# Patient Record
Sex: MALE | Race: WHITE | NOT HISPANIC OR LATINO | URBAN - METROPOLITAN AREA
[De-identification: names, ages, dates, MRNs, and addresses within clinical notes are randomized per-mention and may not be internally consistent; named-entity substitution may affect disease eponyms.]

---

## 2023-08-03 ENCOUNTER — HOSPITAL ENCOUNTER (INPATIENT)
Facility: HOSPITAL | Age: 53
LOS: 14 days | Discharge: NURSING, REHAB OR LONG-TERM CARE | DRG: 372 | End: 2023-08-17
Attending: HOSPITALIST | Admitting: STUDENT IN AN ORGANIZED HEALTH CARE EDUCATION/TRAINING PROGRAM
Payer: COMMERCIAL

## 2023-08-03 DIAGNOSIS — E87.6 HYPOKALEMIA: ICD-10-CM

## 2023-08-03 DIAGNOSIS — K74.60 HEPATIC CIRRHOSIS, UNSPECIFIED HEPATIC CIRRHOSIS TYPE, UNSPECIFIED WHETHER ASCITES PRESENT: ICD-10-CM

## 2023-08-03 DIAGNOSIS — R18.8 ABDOMINAL FLUID COLLECTION: ICD-10-CM

## 2023-08-03 DIAGNOSIS — R18.8 INTRA-ABDOMINAL FLUID COLLECTION: ICD-10-CM

## 2023-08-03 DIAGNOSIS — D50.8 OTHER IRON DEFICIENCY ANEMIA: ICD-10-CM

## 2023-08-03 DIAGNOSIS — K65.1 INTRA-ABDOMINAL ABSCESS: ICD-10-CM

## 2023-08-03 DIAGNOSIS — L30.9 ECZEMA, UNSPECIFIED TYPE: Primary | ICD-10-CM

## 2023-08-03 PROBLEM — F32.9 MDD (MAJOR DEPRESSIVE DISORDER): Status: ACTIVE | Noted: 2023-08-03

## 2023-08-03 PROBLEM — D64.9 NORMOCYTIC ANEMIA: Status: ACTIVE | Noted: 2023-08-03

## 2023-08-03 PROBLEM — B19.20 HCV (HEPATITIS C VIRUS): Status: ACTIVE | Noted: 2023-08-03

## 2023-08-03 PROBLEM — D72.829 LEUKOCYTOSIS: Status: ACTIVE | Noted: 2023-08-03

## 2023-08-03 PROBLEM — S30.1XXA ABDOMINAL HEMATOMA: Status: ACTIVE | Noted: 2023-08-03

## 2023-08-03 PROBLEM — R06.6 HICCUPS: Status: ACTIVE | Noted: 2023-08-03

## 2023-08-03 PROBLEM — I10 HTN (HYPERTENSION): Status: ACTIVE | Noted: 2023-08-03

## 2023-08-03 PROBLEM — E03.9 HYPOTHYROIDISM: Status: ACTIVE | Noted: 2023-08-03

## 2023-08-03 PROBLEM — R22.0 TONGUE SWELLING: Status: ACTIVE | Noted: 2023-08-03

## 2023-08-03 PROBLEM — E83.51 HYPOCALCEMIA: Status: ACTIVE | Noted: 2023-08-03

## 2023-08-03 PROBLEM — D50.9 IDA (IRON DEFICIENCY ANEMIA): Status: ACTIVE | Noted: 2023-08-03

## 2023-08-03 PROBLEM — K80.20 GALLSTONES: Status: ACTIVE | Noted: 2023-08-03

## 2023-08-03 LAB
ALBUMIN SERPL BCP-MCNC: 2.4 G/DL (ref 3.5–5.2)
ALBUMIN SERPL BCP-MCNC: 2.4 G/DL (ref 3.5–5.2)
ALP SERPL-CCNC: 119 U/L (ref 55–135)
ALP SERPL-CCNC: 120 U/L (ref 55–135)
ALT SERPL W/O P-5'-P-CCNC: 20 U/L (ref 10–44)
ALT SERPL W/O P-5'-P-CCNC: 21 U/L (ref 10–44)
ANION GAP SERPL CALC-SCNC: 11 MMOL/L (ref 8–16)
ANION GAP SERPL CALC-SCNC: 11 MMOL/L (ref 8–16)
ANION GAP SERPL CALC-SCNC: 7 MMOL/L (ref 8–16)
AST SERPL-CCNC: 32 U/L (ref 10–40)
AST SERPL-CCNC: 32 U/L (ref 10–40)
BASOPHILS # BLD AUTO: 0.07 K/UL (ref 0–0.2)
BASOPHILS NFR BLD: 0.5 % (ref 0–1.9)
BILIRUB SERPL-MCNC: 2.2 MG/DL (ref 0.1–1)
BILIRUB SERPL-MCNC: 2.2 MG/DL (ref 0.1–1)
BUN SERPL-MCNC: 11 MG/DL (ref 6–20)
BUN SERPL-MCNC: 13 MG/DL (ref 6–20)
BUN SERPL-MCNC: 14 MG/DL (ref 6–20)
CALCIUM SERPL-MCNC: 7.9 MG/DL (ref 8.7–10.5)
CALCIUM SERPL-MCNC: 8 MG/DL (ref 8.7–10.5)
CALCIUM SERPL-MCNC: 8.2 MG/DL (ref 8.7–10.5)
CHLORIDE SERPL-SCNC: 97 MMOL/L (ref 95–110)
CHLORIDE SERPL-SCNC: 99 MMOL/L (ref 95–110)
CHLORIDE SERPL-SCNC: 99 MMOL/L (ref 95–110)
CO2 SERPL-SCNC: 26 MMOL/L (ref 23–29)
CO2 SERPL-SCNC: 27 MMOL/L (ref 23–29)
CO2 SERPL-SCNC: 27 MMOL/L (ref 23–29)
CREAT SERPL-MCNC: 0.6 MG/DL (ref 0.5–1.4)
CREAT SERPL-MCNC: 0.7 MG/DL (ref 0.5–1.4)
CREAT SERPL-MCNC: 0.7 MG/DL (ref 0.5–1.4)
DIFFERENTIAL METHOD: ABNORMAL
EOSINOPHIL # BLD AUTO: 0.2 K/UL (ref 0–0.5)
EOSINOPHIL NFR BLD: 1.6 % (ref 0–8)
ERYTHROCYTE [DISTWIDTH] IN BLOOD BY AUTOMATED COUNT: 15 % (ref 11.5–14.5)
EST. GFR  (NO RACE VARIABLE): >60 ML/MIN/1.73 M^2
FERRITIN SERPL-MCNC: 213 NG/ML (ref 20–300)
GLUCOSE SERPL-MCNC: 112 MG/DL (ref 70–110)
GLUCOSE SERPL-MCNC: 114 MG/DL (ref 70–110)
GLUCOSE SERPL-MCNC: 94 MG/DL (ref 70–110)
HCT VFR BLD AUTO: 35.6 % (ref 40–54)
HCV AB SERPL QL IA: REACTIVE
HGB BLD-MCNC: 12.1 G/DL (ref 14–18)
HIV 1+2 AB+HIV1 P24 AG SERPL QL IA: NORMAL
IMM GRANULOCYTES # BLD AUTO: 0.41 K/UL (ref 0–0.04)
IMM GRANULOCYTES NFR BLD AUTO: 2.7 % (ref 0–0.5)
INR PPP: 1.2 (ref 0.8–1.2)
IRON SERPL-MCNC: 26 UG/DL (ref 45–160)
LYMPHOCYTES # BLD AUTO: 1.2 K/UL (ref 1–4.8)
LYMPHOCYTES NFR BLD: 7.7 % (ref 18–48)
MAGNESIUM SERPL-MCNC: 2 MG/DL (ref 1.6–2.6)
MAGNESIUM SERPL-MCNC: 2.1 MG/DL (ref 1.6–2.6)
MCH RBC QN AUTO: 29.7 PG (ref 27–31)
MCHC RBC AUTO-ENTMCNC: 34 G/DL (ref 32–36)
MCV RBC AUTO: 87 FL (ref 82–98)
MONOCYTES # BLD AUTO: 2.4 K/UL (ref 0.3–1)
MONOCYTES NFR BLD: 15.9 % (ref 4–15)
NEUTROPHILS # BLD AUTO: 10.9 K/UL (ref 1.8–7.7)
NEUTROPHILS NFR BLD: 71.6 % (ref 38–73)
NRBC BLD-RTO: 0 /100 WBC
OVALOCYTES BLD QL SMEAR: ABNORMAL
PHOSPHATE SERPL-MCNC: 3.3 MG/DL (ref 2.7–4.5)
PHOSPHATE SERPL-MCNC: 3.3 MG/DL (ref 2.7–4.5)
PLATELET # BLD AUTO: 101 K/UL (ref 150–450)
PLATELET BLD QL SMEAR: ABNORMAL
PMV BLD AUTO: 11 FL (ref 9.2–12.9)
POTASSIUM SERPL-SCNC: 2.9 MMOL/L (ref 3.5–5.1)
POTASSIUM SERPL-SCNC: 2.9 MMOL/L (ref 3.5–5.1)
POTASSIUM SERPL-SCNC: 3 MMOL/L (ref 3.5–5.1)
PROCALCITONIN SERPL IA-MCNC: 0.59 NG/ML
PROT SERPL-MCNC: 5.7 G/DL (ref 6–8.4)
PROT SERPL-MCNC: 5.7 G/DL (ref 6–8.4)
PROTHROMBIN TIME: 12.5 SEC (ref 9–12.5)
RBC # BLD AUTO: 4.08 M/UL (ref 4.6–6.2)
SATURATED IRON: 10 % (ref 20–50)
SODIUM SERPL-SCNC: 133 MMOL/L (ref 136–145)
SODIUM SERPL-SCNC: 135 MMOL/L (ref 136–145)
SODIUM SERPL-SCNC: 136 MMOL/L (ref 136–145)
SPHEROCYTES BLD QL SMEAR: ABNORMAL
TOTAL IRON BINDING CAPACITY: 250 UG/DL (ref 250–450)
TOXIC GRANULES BLD QL SMEAR: PRESENT
TRANSFERRIN SERPL-MCNC: 169 MG/DL (ref 200–375)
TRANSFERRIN SERPL-MCNC: 169 MG/DL (ref 200–375)
WBC # BLD AUTO: 15.24 K/UL (ref 3.9–12.7)

## 2023-08-03 PROCEDURE — 63600175 PHARM REV CODE 636 W HCPCS: Performed by: STUDENT IN AN ORGANIZED HEALTH CARE EDUCATION/TRAINING PROGRAM

## 2023-08-03 PROCEDURE — 25000003 PHARM REV CODE 250: Performed by: STUDENT IN AN ORGANIZED HEALTH CARE EDUCATION/TRAINING PROGRAM

## 2023-08-03 PROCEDURE — 80048 BASIC METABOLIC PNL TOTAL CA: CPT | Mod: XB | Performed by: STUDENT IN AN ORGANIZED HEALTH CARE EDUCATION/TRAINING PROGRAM

## 2023-08-03 PROCEDURE — 11000001 HC ACUTE MED/SURG PRIVATE ROOM

## 2023-08-03 PROCEDURE — 99235 HOSP IP/OBS SAME DATE MOD 70: CPT | Mod: ,,, | Performed by: PHYSICIAN ASSISTANT

## 2023-08-03 PROCEDURE — 87040 BLOOD CULTURE FOR BACTERIA: CPT | Performed by: STUDENT IN AN ORGANIZED HEALTH CARE EDUCATION/TRAINING PROGRAM

## 2023-08-03 PROCEDURE — 25500020 PHARM REV CODE 255: Performed by: HOSPITALIST

## 2023-08-03 PROCEDURE — 99223 1ST HOSP IP/OBS HIGH 75: CPT | Mod: ,,, | Performed by: INTERNAL MEDICINE

## 2023-08-03 PROCEDURE — 84466 ASSAY OF TRANSFERRIN: CPT

## 2023-08-03 PROCEDURE — 87389 HIV-1 AG W/HIV-1&-2 AB AG IA: CPT | Performed by: STUDENT IN AN ORGANIZED HEALTH CARE EDUCATION/TRAINING PROGRAM

## 2023-08-03 PROCEDURE — 36415 COLL VENOUS BLD VENIPUNCTURE: CPT

## 2023-08-03 PROCEDURE — 99223 PR INITIAL HOSPITAL CARE,LEVL III: ICD-10-PCS | Mod: ,,, | Performed by: HOSPITALIST

## 2023-08-03 PROCEDURE — 99223 PR INITIAL HOSPITAL CARE,LEVL III: ICD-10-PCS | Mod: ,,, | Performed by: INTERNAL MEDICINE

## 2023-08-03 PROCEDURE — 92610 EVALUATE SWALLOWING FUNCTION: CPT

## 2023-08-03 PROCEDURE — 99223 1ST HOSP IP/OBS HIGH 75: CPT | Mod: ,,, | Performed by: HOSPITALIST

## 2023-08-03 PROCEDURE — 93010 EKG 12-LEAD: ICD-10-PCS | Mod: ,,, | Performed by: INTERNAL MEDICINE

## 2023-08-03 PROCEDURE — 93005 ELECTROCARDIOGRAM TRACING: CPT

## 2023-08-03 PROCEDURE — 25000003 PHARM REV CODE 250

## 2023-08-03 PROCEDURE — 80053 COMPREHEN METABOLIC PANEL: CPT | Mod: 91

## 2023-08-03 PROCEDURE — 84100 ASSAY OF PHOSPHORUS: CPT

## 2023-08-03 PROCEDURE — 36415 COLL VENOUS BLD VENIPUNCTURE: CPT | Performed by: STUDENT IN AN ORGANIZED HEALTH CARE EDUCATION/TRAINING PROGRAM

## 2023-08-03 PROCEDURE — 21400001 HC TELEMETRY ROOM

## 2023-08-03 PROCEDURE — 84145 PROCALCITONIN (PCT): CPT | Performed by: STUDENT IN AN ORGANIZED HEALTH CARE EDUCATION/TRAINING PROGRAM

## 2023-08-03 PROCEDURE — 83735 ASSAY OF MAGNESIUM: CPT | Mod: 91

## 2023-08-03 PROCEDURE — 85025 COMPLETE CBC W/AUTO DIFF WBC: CPT

## 2023-08-03 PROCEDURE — 86803 HEPATITIS C AB TEST: CPT

## 2023-08-03 PROCEDURE — 99235 PR OBSERV/HOSP SAME DATE,LEVL IV: ICD-10-PCS | Mod: ,,, | Performed by: PHYSICIAN ASSISTANT

## 2023-08-03 PROCEDURE — 85610 PROTHROMBIN TIME: CPT

## 2023-08-03 PROCEDURE — 93010 ELECTROCARDIOGRAM REPORT: CPT | Mod: ,,, | Performed by: INTERNAL MEDICINE

## 2023-08-03 PROCEDURE — 82728 ASSAY OF FERRITIN: CPT

## 2023-08-03 RX ORDER — ACETAMINOPHEN 325 MG/1
650 TABLET ORAL EVERY 4 HOURS
Status: DISCONTINUED | OUTPATIENT
Start: 2023-08-05 | End: 2023-08-04

## 2023-08-03 RX ORDER — IBUPROFEN 200 MG
24 TABLET ORAL
Status: DISCONTINUED | OUTPATIENT
Start: 2023-08-03 | End: 2023-08-17 | Stop reason: HOSPADM

## 2023-08-03 RX ORDER — METOCLOPRAMIDE 5 MG/1
10 TABLET ORAL 2 TIMES DAILY
Status: COMPLETED | OUTPATIENT
Start: 2023-08-03 | End: 2023-08-04

## 2023-08-03 RX ORDER — NALOXONE HCL 0.4 MG/ML
0.02 VIAL (ML) INJECTION
Status: DISCONTINUED | OUTPATIENT
Start: 2023-08-03 | End: 2023-08-17 | Stop reason: HOSPADM

## 2023-08-03 RX ORDER — SERTRALINE HYDROCHLORIDE 50 MG/1
50 TABLET, FILM COATED ORAL DAILY
COMMUNITY

## 2023-08-03 RX ORDER — NADOLOL 20 MG/1
20 TABLET ORAL DAILY
COMMUNITY

## 2023-08-03 RX ORDER — POTASSIUM CHLORIDE 7.45 MG/ML
10 INJECTION INTRAVENOUS
Status: COMPLETED | OUTPATIENT
Start: 2023-08-03 | End: 2023-08-03

## 2023-08-03 RX ORDER — PANTOPRAZOLE SODIUM 40 MG/1
40 TABLET, DELAYED RELEASE ORAL DAILY
COMMUNITY

## 2023-08-03 RX ORDER — PROPRANOLOL HYDROCHLORIDE 10 MG/1
20 TABLET ORAL 2 TIMES DAILY
Status: DISCONTINUED | OUTPATIENT
Start: 2023-08-03 | End: 2023-08-03

## 2023-08-03 RX ORDER — LEVOTHYROXINE SODIUM 75 UG/1
75 TABLET ORAL
Status: DISCONTINUED | OUTPATIENT
Start: 2023-08-03 | End: 2023-08-17 | Stop reason: HOSPADM

## 2023-08-03 RX ORDER — NYSTATIN 100000 [USP'U]/ML
500000 SUSPENSION ORAL 4 TIMES DAILY
Status: DISCONTINUED | OUTPATIENT
Start: 2023-08-03 | End: 2023-08-15

## 2023-08-03 RX ORDER — ACETAMINOPHEN 325 MG/1
650 TABLET ORAL 2 TIMES DAILY PRN
COMMUNITY

## 2023-08-03 RX ORDER — GLUCAGON 1 MG
1 KIT INJECTION
Status: DISCONTINUED | OUTPATIENT
Start: 2023-08-03 | End: 2023-08-17 | Stop reason: HOSPADM

## 2023-08-03 RX ORDER — SERTRALINE HYDROCHLORIDE 25 MG/1
25 TABLET, FILM COATED ORAL DAILY
Status: DISCONTINUED | OUTPATIENT
Start: 2023-08-03 | End: 2023-08-17 | Stop reason: HOSPADM

## 2023-08-03 RX ORDER — IBUPROFEN 200 MG
16 TABLET ORAL
Status: DISCONTINUED | OUTPATIENT
Start: 2023-08-03 | End: 2023-08-17 | Stop reason: HOSPADM

## 2023-08-03 RX ORDER — LEVOTHYROXINE SODIUM 75 UG/1
75 TABLET ORAL
COMMUNITY

## 2023-08-03 RX ORDER — HYDROCHLOROTHIAZIDE 25 MG/1
25 TABLET ORAL 2 TIMES DAILY
Status: ON HOLD | COMMUNITY
End: 2023-08-14 | Stop reason: HOSPADM

## 2023-08-03 RX ORDER — PROPRANOLOL HYDROCHLORIDE 10 MG/1
10 TABLET ORAL 2 TIMES DAILY
Status: DISCONTINUED | OUTPATIENT
Start: 2023-08-03 | End: 2023-08-17 | Stop reason: HOSPADM

## 2023-08-03 RX ORDER — PANTOPRAZOLE SODIUM 40 MG/1
40 TABLET, DELAYED RELEASE ORAL DAILY
Status: DISCONTINUED | OUTPATIENT
Start: 2023-08-03 | End: 2023-08-17 | Stop reason: HOSPADM

## 2023-08-03 RX ORDER — SODIUM CHLORIDE 0.9 % (FLUSH) 0.9 %
10 SYRINGE (ML) INJECTION EVERY 12 HOURS PRN
Status: DISCONTINUED | OUTPATIENT
Start: 2023-08-03 | End: 2023-08-17 | Stop reason: HOSPADM

## 2023-08-03 RX ORDER — BACLOFEN 5 MG/1
5 TABLET ORAL 3 TIMES DAILY
Status: DISCONTINUED | OUTPATIENT
Start: 2023-08-03 | End: 2023-08-04

## 2023-08-03 RX ORDER — BUSPIRONE HYDROCHLORIDE 15 MG/1
7.5 TABLET ORAL 2 TIMES DAILY
COMMUNITY

## 2023-08-03 RX ADMIN — BACLOFEN 5 MG: 5 TABLET ORAL at 03:08

## 2023-08-03 RX ADMIN — POTASSIUM CHLORIDE 10 MEQ: 7.46 INJECTION, SOLUTION INTRAVENOUS at 01:08

## 2023-08-03 RX ADMIN — PROPRANOLOL HYDROCHLORIDE 10 MG: 10 TABLET ORAL at 08:08

## 2023-08-03 RX ADMIN — POTASSIUM CHLORIDE 10 MEQ: 7.46 INJECTION, SOLUTION INTRAVENOUS at 09:08

## 2023-08-03 RX ADMIN — POTASSIUM CHLORIDE 10 MEQ: 7.46 INJECTION, SOLUTION INTRAVENOUS at 12:08

## 2023-08-03 RX ADMIN — SERTRALINE HYDROCHLORIDE 25 MG: 25 TABLET ORAL at 09:08

## 2023-08-03 RX ADMIN — NYSTATIN 500000 UNITS: 100000 SUSPENSION ORAL at 01:08

## 2023-08-03 RX ADMIN — POTASSIUM CHLORIDE 10 MEQ: 7.46 INJECTION, SOLUTION INTRAVENOUS at 03:08

## 2023-08-03 RX ADMIN — PROPRANOLOL HYDROCHLORIDE 10 MG: 10 TABLET ORAL at 09:08

## 2023-08-03 RX ADMIN — BUSPIRONE HYDROCHLORIDE 7.5 MG: 5 TABLET ORAL at 09:08

## 2023-08-03 RX ADMIN — BACLOFEN 5 MG: 5 TABLET ORAL at 08:08

## 2023-08-03 RX ADMIN — LEVOTHYROXINE SODIUM 75 MCG: 75 TABLET ORAL at 06:08

## 2023-08-03 RX ADMIN — NYSTATIN 500000 UNITS: 100000 SUSPENSION ORAL at 08:08

## 2023-08-03 RX ADMIN — NYSTATIN 500000 UNITS: 100000 SUSPENSION ORAL at 05:08

## 2023-08-03 RX ADMIN — BUSPIRONE HYDROCHLORIDE 7.5 MG: 5 TABLET ORAL at 08:08

## 2023-08-03 RX ADMIN — POTASSIUM CHLORIDE 10 MEQ: 7.46 INJECTION, SOLUTION INTRAVENOUS at 06:08

## 2023-08-03 RX ADMIN — PANTOPRAZOLE SODIUM 40 MG: 40 TABLET, DELAYED RELEASE ORAL at 09:08

## 2023-08-03 RX ADMIN — IOHEXOL 100 ML: 350 INJECTION, SOLUTION INTRAVENOUS at 04:08

## 2023-08-03 RX ADMIN — POTASSIUM CHLORIDE 10 MEQ: 7.46 INJECTION, SOLUTION INTRAVENOUS at 10:08

## 2023-08-03 RX ADMIN — BACLOFEN 5 MG: 5 TABLET ORAL at 09:08

## 2023-08-03 NOTE — ASSESSMENT & PLAN NOTE
Patient with leukocytosis that was present at OSH at 17.9 and downtrending to 15.24. patient otherwise is hemodynamically stable. Low concern for sepsis at this time however high chance for decompensation given presence of hematoma    Unclear etiology consider infected hematoma vs stress    - continue to monitor

## 2023-08-03 NOTE — MEDICAL/APP STUDENT
[facility] Consult Note  Patient Name: Gregg Page  MRN: [id number]  Location: [facility]  Attending: Rosalind Borden MD   Admit Date: 8/3/2023  Today's Date: 08/03/2023      SUBJECTIVE:     HPI:    Pt is a 53 year old male with a PMHx of cirrhosis 2/2 EtOH use, HCV, GIB 2/2 esophageal varices w/ band ligation, recurrent cholelithiasis, HTN, psoarsis, hypothyroidism, MDD, and hydrocephalous presented to [facility], [place] for abdominal and chest pain. He states severe RUQ post prandial pain radiating to shoulder. He reports similar presentation 1 week prior with hospitalization for one week. He was told he had gallstones and d/c and resolved with abx however has ongoing hiccups since. In addition, he has had prior presentations that were treated in [place], [place]. Since transfer he says pain has significantly improved. We are consulted for hepatology evaluation in setting of alcoholic cirrhosis. IR has been consulted regarding management of hematoma. CT abd and US at [facility] demonstrated upper abdominal fluid collection likely a hematoma, surrounds the liver, stomach, and spleen. Origin is not identified however may represent a variceal or hepatic hematoma. No active extravasation identified. Has 1.1 cm hypoattenuating lesion in left hepatic lobe which is consistent with prior imaging in June 2023. Denies trauma and alcohol use past 2-3 years since learning of cirrhosis. States he has MRCP done in past but unable to see records of it.     WBC 17.9 > 15.24. PT 12.5. INR 1.2. Alb 2.4. Tbili 2.2. AST 32. ALT 21.     Review of patient's allergies indicates:  No Known Allergies    No past medical history on file.  No past surgical history on file.  No family history on file.       All home medications reviewed.    Review of Systems   Constitutional:  Negative for chills and fever.   HENT:          Hiccups     Gastrointestinal:  Positive for abdominal pain (epigastric). Negative for blood in stool,  "constipation, diarrhea, melena, nausea and vomiting.   Skin:  Positive for rash (psorasis).   Psychiatric/Behavioral:  The patient is nervous/anxious.        OBJECTIVE:     Vital Signs Trends/History Reviewed  Vitals:    08/03/23 0045   BP: 135/61   BP Location: Left arm   Patient Position: Lying   Pulse: 68   Resp: 18   Temp: 98.3 °F (36.8 °C)   TempSrc: Oral   SpO2: 97%   Weight: 92.3 kg (203 lb 7.8 oz)   Height: 5' 11" (1.803 m)         Physical Exam  Constitutional:       General: He is not in acute distress.     Appearance: Normal appearance. He is not ill-appearing, toxic-appearing or diaphoretic.   Abdominal:      General: There is no distension.      Palpations: There is no mass.      Tenderness: There is no abdominal tenderness. There is no right CVA tenderness, left CVA tenderness, guarding or rebound.      Hernia: No hernia is present.   Skin:     General: Skin is warm.      Coloration: Skin is not jaundiced or pale.      Findings: Rash (psorasis abd) present.   Neurological:      Mental Status: He is alert.   Psychiatric:         Mood and Affect: Mood normal.         Behavior: Behavior normal.         Thought Content: Thought content normal.         Judgment: Judgment normal.           Laboratory: All labs within last 24 hours reviewed.     MELD:   MELD 3.0: 14 at 8/3/2023  2:56 AM  MELD-Na: 11 at 8/3/2023  2:56 AM  Calculated from:  Serum Creatinine: 0.7 mg/dL (Using min of 1 mg/dL) at 8/3/2023  2:56 AM  Serum Sodium: 136 mmol/L at 8/3/2023  2:56 AM  Total Bilirubin: 2.2 mg/dL at 8/3/2023  2:56 AM  Serum Albumin: 2.4 g/dL at 8/3/2023  2:56 AM  INR(ratio): 1.2 at 8/3/2023  2:56 AM  Age at listing (hypothetical): 53 years  Sex: Male at 8/3/2023  2:56 AM        Imaging: All imaging within last 24 hours reviewed.       Infusions:        Scheduled Medications:    baclofen  5 mg Oral TID    busPIRone  7.5 mg Oral BID    levothyroxine  75 mcg Oral Before breakfast    pantoprazole  40 mg Oral Daily    " propranoloL  10 mg Oral BID    sertraline  25 mg Oral Daily       PRN Medications:   dextrose 10%, dextrose 10%, glucagon (human recombinant), glucose, glucose, naloxone, sodium chloride 0.9%    ASSESSMENT & RECOMMENDATIONS     This is a 53 year old male with PMH significant for cirrhosis 2/2 EtOH use, resolved HCV, GIB 2/2 esophageal varices w/ band ligation, and recurrent cholelithiasis. He presented to OSH yesterday with suspected acute cholecystitis. Imaging showed incidental finding of hematoma surrounding liver, stomach, and spleen with unidentified origin. No active extravasation identified. In addition, he has a 1.1 cm hypoattenuating lesion in left hepatic lobe which is consistent with prior imaging in June 2023. [place] [facility] [place]. States he had MRCP in past but unable to see records.     Our recommendations are as follows:     Acute cholecystitis   - leukocytosis improving 17.9 > 15.24   - clinically improving; stone likely passed   - cholecystectomy inpatient vs outpatient     Hematoma   - IR consulted   - Hemodynamics are stable     Liver mass   - 1.1 cm hypoattenuating lesion in L hepatic lobe consistent with prior imaging in June 2023  - CT c/a/p pending      Thank you for allowing us to participate in the care of this patient. Please call with questions.    Alexis Juarez MSY4  [facility]

## 2023-08-03 NOTE — PLAN OF CARE
Problem: SLP  Goal: SLP Goal  Description: Speech Pathology Goals  To be met by 8/17/523    1. Pt will participate in ongoing diagnostic dysphagia therapy         Outcome: Ongoing, Progressing      Waqas Teran, Varsha PEÑA MD 2 days ago        Need 2 Valium for test Monday - [facility]is and [address] please see above. Patient has MRI tomorrow (11/24)

## 2023-08-03 NOTE — ASSESSMENT & PLAN NOTE
Patient with new hiccups for one week while at OSH. possibly 2/2 from hematoma causing irritation to phrenic nerve     - baclofen 5 TID prn

## 2023-08-03 NOTE — PT/OT/SLP EVAL
"Speech Language Pathology Evaluation  Bedside Swallow    Patient Name:  Gregg Page   MRN:  [id number]  Admitting Diagnosis: Cirrhosis    Recommendations:                 General Recommendations:  Dysphagia therapy  Diet recommendations:  Thin/clear liquids  Note: pt may have any thin liquid at this time (i.e. coffee, milk, tea, etc)  Aspiration Precautions: Standard aspiration precautions   General Precautions: Standard, fall  Communication strategies:  none    Assessment:     Gregg Page is a 53 y.o. male who presents with a functional swallow for PO intake of thin liquids at this time. Additional trials not completed 2/2 PO restrictions for upcoming testing. Will continue to follow for assessment of advanced textures in future sessions.    History:     No past medical history on file.    No past surgical history on file.    Social History: Patient lives with his spouse    Prior Intubation HX:  n/a    Modified Barium Swallow: n/a    Chest X-Rays: 8/3/23- The trachea and cardiomediastinal silhouette are within normal limits.  There are slightly increased perihilar and left basilar opacities which could represent a component of pulmonary vascular congestion.  No definitive evidence for pneumothorax.  Osseous structures demonstrate no evidence for acute fractures or dislocations.    Prior diet: Regular/thin    Occupation/hobbies/homemaking: none stated    Subjective     Spoke with RN prior to session who reported pt NPO for upcoming procedure. RN spoke with medical team who cleared pt for small trials of thin liquids to determine if oral contrast could be given for medical procedures. Pt found sleeping in bed upon SLP entry into room and roused with MIN verbal cues.     Patient goals: "What do they call it?"      Pain/Comfort:  Pain Rating 1: 0/10    Respiratory Status: Room air    Objective:     Oral Musculature Evaluation  Oral Musculature: WFL  Dentition: present and adequate  Secretion Management: " adequate  Mucosal Quality: good  Mandibular Strength and Mobility: WFL  Oral Labial Strength and Mobility: functional seal, functional coordination  Lingual Strength and Mobility: functional anterior elevation, functional lateral movement  Voice Prior to PO Intake: clear    Bedside Swallow Eval:   Consistencies Assessed:  Thin liquids: self-fed by the single open cup sip x2    Oral Phase:   WFL    Pharyngeal Phase:   no overt clinical signs/symptoms of aspiration  no overt clinical signs/symptoms of pharyngeal dysphagia    Compensatory Strategies  None    Treatment: Pt lethargic throughout session and required intermittent MIN verbal cueing to sustain adequate levels of alertness for PO intake. Discussed recommendations for thin liquids at this time and follow up to assess for advanced textures pending clearance from medical team. Pt verbalized understanding and had no additional questions or concerns upon SLP exit.      Goals:   Multidisciplinary Problems       SLP Goals          Problem: SLP    Goal Priority Disciplines Outcome   SLP Goal     SLP Ongoing, Progressing   Description: Speech Pathology Goals  To be met by 8/17/523    1. Pt will participate in ongoing diagnostic dysphagia therapy                              Plan:     Patient to be seen:  3 x/week   Plan of Care expires:  09/02/23  Plan of Care reviewed with:  patient   SLP Follow-Up:  Yes       Discharge recommendations:  other (see comments)   Barriers to Discharge:  None    Time Tracking:     SLP Treatment Date:   08/03/23  Speech Start Time:  1430  Speech Stop Time:  1435     Speech Total Time (min):  5 min    Billable Minutes: Eval Swallow and Oral Function 5      08/03/2023

## 2023-08-03 NOTE — HPI
Mr. Page is a 53 YOM with cirrhosis 2/2 to EtOH use and HCV, esophageal varices, GIB, HTN, hypothyroidism, MDD, hydrocephalous, cholelithiasis, who presented to OSH for abdominal pain and [facility] evaluation of intra-thoracic hematoma and hepatology evaluation. Of note, patient was hospitalized 6/23 with similar presentation had MRCP done at that time (unavailable for review).  Abdominal pain radiates to the chest, onset 6 days prior to presentation at OSH. Patient denies melena, BRBPR, continued abdominal pain or CP. Patient states morning of transfer he started having tongue swelling and pain, feels dry, remitted with water, has dysphagia when his tongue is dry only, pain described as burning and located throughout tongue. No recent procedures/liver biopsy done. Patient also states for the past 1 week has had intractable hiccups which cause anxiety.     Patient hemodynamically stable. Labs concerning for new leukocytosis 17.9, normocytic anemia 12.6 ammonia 50, low Hct 32.2, elevated PT 16.6, INR 1.31, Troponin, lipase, U/A, BNP, CXR wnl    CT A/P with IV contrast done and shows upper abdominal fluid collection likely a hematoma, surrounds the liver, stomach, and spleen. Origin is not identified however may represent a variceal or hepatic hematoma. No active extravasation identified. Collection measures greater than 21.7 cm in transverse diameter and largest single pocket measures approx 7.5cm in maximal AP dimension.

## 2023-08-03 NOTE — ASSESSMENT & PLAN NOTE
Patient normotensive with hematoma present. Given risk for possible hemorrhagic shock will hold home BP meds     - hold home amlodipine 5   - hold home HCTZ 25

## 2023-08-03 NOTE — PLAN OF CARE
I have reviewed the chart of Gregg Page and collaborated with Rosalind Borden MD in the care of the patient who is hospitalized for the following:    Active Hospital Problems    Diagnosis    *Cirrhosis    Normocytic anemia    Hypothyroidism    MDD (major depressive disorder)    HCV (hepatitis C virus)    Hiccups    Gallstones    HTN (hypertension)    Leukocytosis    Tongue swelling    Hypokalemia    LUISA (iron deficiency anemia)    Hypocalcemia          I have reviewed the Gregg Page with the multidisciplinary team during discharge huddle.       Adri Osman PA-C  [facility]

## 2023-08-03 NOTE — SUBJECTIVE & OBJECTIVE
No past medical history on file.    No past surgical history on file.    Review of patient's allergies indicates:  No Known Allergies    No current facility-administered medications on file prior to encounter.     No current outpatient medications on file prior to encounter.     Family History    None       Tobacco Use    Smoking status: Not on file    Smokeless tobacco: Not on file   Substance and Sexual Activity    Alcohol use: Not on file    Drug use: Not on file    Sexual activity: Not on file     Review of Systems   Constitutional:  Negative for chills and fever.   HENT:  Negative for sore throat.    Respiratory:  Negative for cough and shortness of breath.    Cardiovascular:  Negative for chest pain and leg swelling.   Gastrointestinal:  Negative for abdominal pain.   Genitourinary:  Negative for difficulty urinating.        Tongue swelling    Musculoskeletal:  Negative for back pain.   Neurological:  Negative for headaches.     Objective:     Vital Signs (Most Recent):  Temp: 98.3 °F (36.8 °C) (08/03/23 0045)  Pulse: 68 (08/03/23 0045)  Resp: 18 (08/03/23 0045)  BP: 135/61 (08/03/23 0045)  SpO2: 97 % (08/03/23 0045) Vital Signs (24h Range):  Temp:  [98.3 °F (36.8 °C)] 98.3 °F (36.8 °C)  Pulse:  [68-82] 68  Resp:  [18-19] 18  SpO2:  [91 %-97 %] 97 %  BP: (134-135)/(61-72) 135/61     Weight: 92.3 kg (203 lb 7.8 oz)  Body mass index is 28.38 kg/m².     Physical Exam  Constitutional:       Appearance: Normal appearance.   HENT:      Head: Normocephalic and atraumatic.      Right Ear: External ear normal.      Left Ear: External ear normal.      Nose: Nose normal.      Mouth/Throat:      Mouth: Mucous membranes are dry.      Comments: Swollen tongue present with green and white lesions  Eyes:      General:         Right eye: No discharge.         Left eye: No discharge.   Cardiovascular:      Rate and Rhythm: Normal rate and regular rhythm.      Pulses: Normal pulses.      Heart sounds: Normal heart sounds.    Pulmonary:      Effort: Pulmonary effort is normal.      Breath sounds: Normal breath sounds.   Abdominal:      General: Abdomen is flat.      Palpations: Abdomen is soft.   Musculoskeletal:      Right lower leg: No edema.      Left lower leg: No edema.   Skin:     General: Skin is warm and dry.   Neurological:      Mental Status: He is alert and oriented to person, place, and time.   Psychiatric:         Mood and Affect: Mood normal.         Behavior: Behavior normal.      Comments: became anxious with hiccups                 Significant Labs: All pertinent labs within the past 24 hours have been reviewed.    Significant Imaging: I have reviewed all pertinent imaging results/findings within the past 24 hours.

## 2023-08-03 NOTE — NURSING
Received patient on direct admit, coming from [place], [place]. Vitals signs stable, dry and redness on arms, chest and legs psoriasis. Skin assessment and 4 eyes done with charge nurse.

## 2023-08-03 NOTE — H&P
[facility] Medicine  History & Physical    Patient Name: Gregg Page  MRN: [id number]  Patient Class: IP- Inpatient  Admission Date: 8/3/2023  Attending Physician: Rosalind Borden MD   Primary Care Provider: Katya, Primary Doctor         Patient information was obtained from patient and past medical records.     Subjective:     Principal Problem:Cirrhosis    Chief Complaint: No chief complaint on file.       HPI: Mr. Page is a 53 YOM with cirrhosis 2/2 to EtOH use and HCV, esophageal varices, GIB, HTN, hypothyroidism, MDD, hydrocephalous, cholelithiasis, who presented to OSH for abdominal pain and is coming [place] [facility] [place] evaluation of intra-thoracic hematoma and hepatology evaluation. Of note, patient was hospitalized 6/23 with similar presentation had MRCP done at that time (unavailable for review).  Abdominal pain radiates to the chest, onset 6 days prior to presentation at OSH. Patient denies melena, BRBPR, continued abdominal pain or CP. Patient states morning of transfer he started having tongue swelling and pain, feels dry, remitted with water, has dysphagia when his tongue is dry only, pain described as burning and located throughout tongue. No recent procedures/liver biopsy done. Patient also states for the past 1 week has had intractable hiccups which cause anxiety.     Patient hemodynamically stable. Labs concerning for new leukocytosis 17.9, normocytic anemia 12.6 ammonia 50, low Hct 32.2, elevated PT 16.6, INR 1.31, Troponin, lipase, U/A, BNP, CXR wnl    CT A/P with IV contrast done and shows upper abdominal fluid collection likely a hematoma, surrounds the liver, stomach, and spleen. Origin is not identified however may represent a variceal or hepatic hematoma. No active extravasation identified. Collection measures greater than 21.7 cm in transverse diameter and largest single pocket measures approx 7.5cm in maximal AP dimension.      No past medical history on file.    No past surgical  history on file.    Review of patient's allergies indicates:  No Known Allergies    No current facility-administered medications on file prior to encounter.     No current outpatient medications on file prior to encounter.     Family History    None       Tobacco Use    Smoking status: Not on file    Smokeless tobacco: Not on file   Substance and Sexual Activity    Alcohol use: Not on file    Drug use: Not on file    Sexual activity: Not on file     Review of Systems   Constitutional:  Negative for chills and fever.   HENT:  Negative for sore throat.    Respiratory:  Negative for cough and shortness of breath.    Cardiovascular:  Negative for chest pain and leg swelling.   Gastrointestinal:  Negative for abdominal pain.   Genitourinary:  Negative for difficulty urinating.        Tongue swelling    Musculoskeletal:  Negative for back pain.   Neurological:  Negative for headaches.     Objective:     Vital Signs (Most Recent):  Temp: 98.3 °F (36.8 °C) (08/03/23 0045)  Pulse: 68 (08/03/23 0045)  Resp: 18 (08/03/23 0045)  BP: 135/61 (08/03/23 0045)  SpO2: 97 % (08/03/23 0045) Vital Signs (24h Range):  Temp:  [98.3 °F (36.8 °C)] 98.3 °F (36.8 °C)  Pulse:  [68-82] 68  Resp:  [18-19] 18  SpO2:  [91 %-97 %] 97 %  BP: (134-135)/(61-72) 135/61     Weight: 92.3 kg (203 lb 7.8 oz)  Body mass index is 28.38 kg/m².     Physical Exam  Constitutional:       Appearance: Normal appearance.   HENT:      Head: Normocephalic and atraumatic.      Right Ear: External ear normal.      Left Ear: External ear normal.      Nose: Nose normal.      Mouth/Throat:      Mouth: Mucous membranes are dry.      Comments: Swollen tongue present with green and white lesions  Eyes:      General:         Right eye: No discharge.         Left eye: No discharge.   Cardiovascular:      Rate and Rhythm: Normal rate and regular rhythm.      Pulses: Normal pulses.      Heart sounds: Normal heart sounds.   Pulmonary:      Effort: Pulmonary effort is normal.       Breath sounds: Normal breath sounds.   Abdominal:      General: Abdomen is flat.      Palpations: Abdomen is soft.   Musculoskeletal:      Right lower leg: No edema.      Left lower leg: No edema.   Skin:     General: Skin is warm and dry.   Neurological:      Mental Status: He is alert and oriented to person, place, and time.   Psychiatric:         Mood and Affect: Mood normal.         Behavior: Behavior normal.      Comments: became anxious with hiccups                 Significant Labs: All pertinent labs within the past 24 hours have been reviewed.    Significant Imaging: I have reviewed all pertinent imaging results/findings within the past 24 hours.    Assessment/Plan:     * Cirrhosis  Mr. Page is a 53 YOM with cirrhosis 2/2 to EtOH use and HCV, esophageal varices, GIB, HTN, hypothyroidism, MDD, hydrocephalous, cholelithiasis, who presented to OSH for abdominal pain and is coming to [facility] for IR evaluation of intra-thoracic hematoma and hepatology evaluation. Presented for abdominal pain which has now resolved. CT A/P with IV contrast done and shows upper abdominal fluid collection likely a hematoma, surrounds the liver, stomach, and spleen. Origin is not identified however may represent a variceal or hepatic hematoma. No active extravasation identified. Patient transferred for IR and hepatology evaluation. History of past varices ligation. Has 1.1 cm hypoattenuating lesion in left hepatic lobe.     MELD 3.0: 14 at 8/3/2023  2:56 AM  MELD-Na: 11 at 8/3/2023  2:56 AM  Calculated from:  Serum Creatinine: 0.7 mg/dL (Using min of 1 mg/dL) at 8/3/2023  2:56 AM  Serum Sodium: 136 mmol/L at 8/3/2023  2:56 AM  Total Bilirubin: 2.2 mg/dL at 8/3/2023  2:56 AM  Serum Albumin: 2.4 g/dL at 8/3/2023  2:56 AM  INR(ratio): 1.2 at 8/3/2023  2:56 AM  Age at listing (hypothetical): 53 years  Sex: Male at 8/3/2023  2:56 AM      - IR consulted for hematoma, recs appreciated  - hepatology consulted, recs appreciated   - patient on  nadolol at OSH, started on propranolol this admission  - continue pantoprazole 40 mg from OSH       Tongue swelling  Patient with tongue swelling that started day of admission. White and green lesions on exam, looks dry. Dysphagia with dry tongue otherwise able to swallow. No hoarseness    - re-evaluate after IVF   - SLP consulted, recs appreciated     Leukocytosis  Patient with leukocytosis that was present at OSH at 17.9 and downtrending to 15.24. patient otherwise is hemodynamically stable. Low concern for sepsis at this time however high chance for decompensation given presence of hematoma    Unclear etiology consider infected hematoma vs stress    - continue to monitor     HTN (hypertension)  Patient normotensive with hematoma present. Given risk for possible hemorrhagic shock will hold home BP meds     - hold home amlodipine 5   - hold home HCTZ 25     Gallstones  Cholelithiasis without cystitis   Seen on imaging 8/2/23    - monitor ALP and bili    Hiccups  Patient with new hiccups for one week while at OSH. possibly 2/2 from hematoma causing irritation to phrenic nerve     - baclofen 5 TID prn       HCV (hepatitis C virus)  Per OSH records patient with history HCV    - HCV lab pending      MDD (major depressive disorder)  - continue home sertraline 50 mg   - continue buspirone 7.5 mg BID    Hypothyroidism  - continue home synthroid 75 mcg    Normocytic anemia  Patient presents with anemia 12.1 mcv 87 (normocytic anemia 12.6 at OSH). No known baseline hemoglobin. Patient with new hematoma formation.     - pending iron studies       VTE Risk Mitigation (From admission, onward)           Ordered     IP VTE LOW RISK PATIENT  Once         08/03/23 0108     Place sequential compression device  Until discontinued         08/03/23 0108                       Samara Farmer DO  Department of Hospital Medicine  [facility] - Surgery

## 2023-08-03 NOTE — SUBJECTIVE & OBJECTIVE
Review of Systems   Constitutional:  Negative for chills, fever and unexpected weight change.   HENT: Negative.     Eyes: Negative.    Respiratory:          Hiccups   Cardiovascular:  Negative for chest pain, palpitations and leg swelling.   Gastrointestinal:  Positive for abdominal distention and abdominal pain. Negative for anal bleeding, blood in stool, constipation, diarrhea, nausea and vomiting.   Genitourinary: Negative.    Musculoskeletal: Negative.    Skin:  Negative for color change.   Allergic/Immunologic: Negative.    Neurological: Negative.        No past medical history on file.    No past surgical history on file.    Review of patient's allergies indicates:  No Known Allergies      Tobacco Use    Smoking status: Not on file    Smokeless tobacco: Not on file   Substance and Sexual Activity    Alcohol use: Not on file    Drug use: Not on file    Sexual activity: Not on file       No medications prior to admission.       Objective:     Vital Signs (Most Recent):  Temp: 98.3 °F (36.8 °C) (08/03/23 0045)  Pulse: 68 (08/03/23 0045)  Resp: 18 (08/03/23 0045)  BP: 135/61 (08/03/23 0045)  SpO2: 97 % (08/03/23 0045) Vital Signs (24h Range):  Temp:  [98.3 °F (36.8 °C)] 98.3 °F (36.8 °C)  Pulse:  [68-82] 68  Resp:  [18-19] 18  SpO2:  [91 %-97 %] 97 %  BP: (134-135)/(61-72) 135/61     Weight: 92.3 kg (203 lb 7.8 oz) (08/03/23 0045)  Body mass index is 28.38 kg/m².       Physical Exam  Vitals and nursing note reviewed.   Constitutional:       General: He is not in acute distress.     Appearance: Normal appearance.   Eyes:      General: No scleral icterus.     Extraocular Movements: Extraocular movements intact.      Pupils: Pupils are equal, round, and reactive to light.   Cardiovascular:      Rate and Rhythm: Normal rate and regular rhythm.      Pulses: Normal pulses.      Heart sounds: Normal heart sounds.   Pulmonary:      Effort: Pulmonary effort is normal.      Breath sounds: Normal breath sounds.   Abdominal:       General: There is distension.      Palpations: Abdomen is soft.      Tenderness: There is abdominal tenderness (mild, RUQ to superficial palpation). There is no guarding.   Musculoskeletal:         General: No swelling or tenderness.   Skin:     General: Skin is warm and dry.      Capillary Refill: Capillary refill takes less than 2 seconds.      Coloration: Skin is not jaundiced.   Neurological:      General: No focal deficit present.      Mental Status: He is alert and oriented to person, place, and time. Mental status is at baseline.            MELD 3.0: 14 at 8/3/2023  2:56 AM  MELD-Na: 11 at 8/3/2023  2:56 AM  Calculated from:  Serum Creatinine: 0.7 mg/dL (Using min of 1 mg/dL) at 8/3/2023  2:56 AM  Serum Sodium: 136 mmol/L at 8/3/2023  2:56 AM  Total Bilirubin: 2.2 mg/dL at 8/3/2023  2:56 AM  Serum Albumin: 2.4 g/dL at 8/3/2023  2:56 AM  INR(ratio): 1.2 at 8/3/2023  2:56 AM  Age at listing (hypothetical): 53 years  Sex: Male at 8/3/2023  2:56 AM      Significant Labs:  Labs within the past month have been reviewed.    Significant Imaging:  Labs: Reviewed

## 2023-08-03 NOTE — CONSULTS
"Interventional Radiology  Consult Note    Consult Requested By: Samara Farmer DO   Reason for Consult: Hematoma, surrounds the liver, stomach, and spleen, possible variceal vs hepatic hematoma     SUBJECTIVE:     Chief Complaint: IR eval of possible hepatic vs variceal hematoma    History of Present Illness:  Gregg Page is a 53 y.o. 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 Interventional Radiology has been consulted for possible IR intervention including image guided percutaneous drain placement/aspiration for management of  hepatic vs variceal hematoma . Pt has had recent imaging including a CT a/p performed at OSH, images are not available. CT supposedly shows "CT A/P with IV contrast done and shows upper abdominal fluid collection likely a hematoma, surrounds the liver, stomach, and spleen. Origin is not identified however may represent a variceal or hepatic hematoma. No active extravasation identified. Collection measures greater than 21.7 cm in transverse diameter and largest single pocket measures approx 7.5cm in maximal AP dimension" per chart.The pt's WBC is 15.24. H/H WNL. No recent blood cultures. He is currently afebrile. The pt is hemodynamically stable.     Review of Systems   Constitutional:  Positive for malaise/fatigue. Negative for chills, fever and weight loss.   Respiratory: Negative.     Cardiovascular: Negative.    Gastrointestinal:  Positive for abdominal pain. Negative for constipation, diarrhea, nausea and vomiting.   Genitourinary: Negative.    Musculoskeletal: Negative.    Neurological:  Positive for weakness. Negative for seizures and loss of consciousness.       Scheduled Meds:   baclofen  5 mg " Oral TID    busPIRone  7.5 mg Oral BID    levothyroxine  75 mcg Oral Before breakfast    pantoprazole  40 mg Oral Daily    potassium chloride  10 mEq Intravenous Q1H    propranoloL  10 mg Oral BID    sertraline  25 mg Oral Daily     Continuous Infusions:  PRN Meds:(Magic mouthwash) 1:1:1 diphenhydrAMINE(Benadryl) 12.5mg/5ml liq, aluminum & magnesium hydroxide-simethicone (Maalox), LIDOcaine viscous 2%, dextrose 10%, dextrose 10%, glucagon (human recombinant), glucose, glucose, naloxone, sodium chloride 0.9%    Review of patient's allergies indicates:  No Known Allergies    No past medical history on file.  No past surgical history on file.  No family history on file.       OBJECTIVE:     Vital Signs (Most Recent)  Temp: 98.3 °F (36.8 °C) (08/03/23 0916)  Pulse: 86 (08/03/23 0916)  Resp: 16 (08/03/23 0916)  BP: 133/68 (08/03/23 0916)  SpO2: (!) 94 % (08/03/23 0916)    Physical Exam:  Physical Exam  Vitals and nursing note reviewed.   Constitutional:       General: He is not in acute distress.     Appearance: He is not ill-appearing.   HENT:      Head: Normocephalic and atraumatic.   Eyes:      Extraocular Movements: Extraocular movements intact.      Conjunctiva/sclera: Conjunctivae normal.      Pupils: Pupils are equal, round, and reactive to light.   Pulmonary:      Effort: Pulmonary effort is normal. No respiratory distress.   Abdominal:      General: There is distension.      Tenderness: There is no abdominal tenderness.   Skin:     General: Skin is warm and dry.      Coloration: Skin is not jaundiced.      Comments: Erythematous plaque-like rash on abdomen and legs   Neurological:      General: No focal deficit present.      Mental Status: He is alert and oriented to person, place, and time.   Psychiatric:         Mood and Affect: Mood normal.         Behavior: Behavior normal.         Thought Content: Thought content normal.         Judgment: Judgment normal.         Laboratory  I have reviewed all pertinent  lab results within the past 24 hours.  CBC:   Recent Labs   Lab 08/03/23 0256   WBC 15.24*   RBC 4.08*   HGB 12.1*   HCT 35.6*   *   MCV 87   MCH 29.7   MCHC 34.0     BMP:   Recent Labs   Lab 08/03/23 0256   *  112*     135*   K 2.9*  2.9*   CL 99  97   CO2 26  27   BUN 14  13   CREATININE 0.7  0.7   CALCIUM 8.2*  8.0*   MG 2.1  2.0     CMP:   Recent Labs   Lab 08/03/23  0256   *  112*   CALCIUM 8.2*  8.0*   ALBUMIN 2.4*  2.4*   PROT 5.7*  5.7*     135*   K 2.9*  2.9*   CO2 26  27   CL 99  97   BUN 14  13   CREATININE 0.7  0.7   ALKPHOS 120  119   ALT 20  21   AST 32  32   BILITOT 2.2*  2.2*     LFTs:   Recent Labs   Lab 08/03/23 0256   ALT 20  21   AST 32  32   ALKPHOS 120  119   BILITOT 2.2*  2.2*   PROT 5.7*  5.7*   ALBUMIN 2.4*  2.4*     Coagulation:   Recent Labs   Lab 08/03/23 0256   LABPROT 12.5   INR 1.2     Microbiology Results (last 7 days)       ** No results found for the last 168 hours. **            ASA/Mallampati  ASA: 2  Mallampati: 2    Imaging:  Recent imaging studies including OSH CT a/p- no images available    ASSESSMENT/PLAN:     Assessment:  53 y.o. male with a PMHx of cirrhosis 2/2 to EtOH use and HCV, esophageal varices, GIB, HTN, hypothyroidism, MDD, hydrocephalous, cholelithiasis who has been referred to IR evaluation of possible hepatic vs variceal hematoma. Case reviewed with Dr. Holder. OSH CT images are currently unavailable. Recommend CT a/p triple phase liver with contrast. Will decide if IR intervention is indicated based on CT results.Plan discussed with ordering physician.The pt verbalized understanding of the plan and would like to proceed.    Plan:  Recommend CT a/p triple phase liver with contrast prior to deciding if IR intervention is indicated  Thank you for the consult. Please contact with questions via Epic secure chat or Spectra Link    Tanya Graff PA-C  Interventional Radiology  Spectra: [phone]

## 2023-08-03 NOTE — ASSESSMENT & PLAN NOTE
PMH involving Cirrhosis 2/2 EtOH use. Hx of Hep C, likely 2/2 IVDU. Complicated by esophageal varices requiring multiple banding procedures (most recently >1 year ago). On this presentation, he denies recent vomiting, retching, melena, jaundice. Exam with abdominal distension, mild RUQ tenderness on superficial palpation. Has required paracentesis once prior, roughly 3-4 years ago.     - Continue to monitor following further imaging  - Low concern for acute decompensation given history, exam, and labwork. Low suspicion for SBP at this time.

## 2023-08-03 NOTE — ASSESSMENT & PLAN NOTE
Patient presents with anemia 12.1 mcv 87 (normocytic anemia 12.6 at OSH). No known baseline hemoglobin. Patient with new hematoma formation.     - pending iron studies

## 2023-08-03 NOTE — PROVIDER TRANSFER
Outside Transfer Acceptance Note / Regional Referral Center    Referring facility: [facility]   Referring provider: AUBREY LLANES  Accepting facility: [facility]  Accepting provider: Dr. Kirill Nunn  Admitting provider: Dr. Kirill Nunn  Reason for transfer: Higher Level of Care   Transfer diagnosis: hepatic hematoma   Transfer specialty requested: Hospital Medicine  Transfer specialty notified: Yes  Transfer level: NUMBER 1-5: 2  Bed type requested: med-surg  Isolation status: No active isolations   Admission class or status: IP- Inpatient      Narrative     53 year old male with cirrhosis 2/2 alcohol use (with esophageal varices, hx of GIB) who presented to the ED with chest and abdominal pain. Of note, patient was hospitalized earlier this month with similar presentation had MRCP done at that time (unavailable for review). Denies any brbpr, melena, no recent procedures/liver biopsy done.    Referring physician reports vitals stable. Labs show: Troponin undetectable, BNP 25.6. lipase 33. WBC 17.9k, Hb 12.6 Hct 37.5, platelets 123k. Ammonia level 50. PT-INR 1.31. CMP unavailable.   U/A negative for infection. CXR without any acute process.     CT A/P with IV contrast done and shows upper abdominal fluid collection likely a hematoma, surrounds the liver, stomach, and spleen. Origin is not identified however may represent a variceal or hepatic hematoma. No active extravasation identified. Collection measures greater than 21.7 cm in transverse diameter and largest single pocket measures approx 7.5cm in maximal AP dimension.    Case discussed with Dr. Manning (IR) and Dr. Rodriguez (hepatology) who will consult on the patient.      Objective     Vitals:    Recent Labs: as above  Recent imaging:   CXR, US liver, CT Abdomen/Pelvis   Airway:     Vent settings:         IV access:    Infusions:   Allergies: Review of patient's allergies indicates:  Not on File   NPO:  No    Anticoagulation:   Anticoagulants       None             Instructions      [facility]-  Admit to Hospital Medicine  Upon patient arrival to floor, please send SecureChat to [facility] or call extension [phone] (if no answer, do NOT leave a callback number after the beep, rather please send a SecureChat to [facility]), for Hospital Medicine admit team assignment and for additional admit orders for the patient.  Do not page the attending physician associated with the patient on arrival (this physician may not be on duty at the time of arrival).  Rather, always send a SecureChat to [facility] or call [phone] to reach the triage physician for orders and team assignment.    Consult hepatology  Consult IR

## 2023-08-03 NOTE — HPI
Mr. Page is a 53 YOM with cirrhosis 2/2 to EtOH use and HCV, esophageal varices, GIB, HTN, hypothyroidism, MDD, hydrocephalous, cholelithiasis, who presented to OSH for right sided abdominal pain. He has been seen in the ED multiple times since May 2023 for recurrent R-sided abdominal pain.   He was seen by [facility] for these symptoms. RUQ US on 8/2 notable for cirrhotic liver with interval development (since 7/26/23) of multiloculated hypoechoic echogenicity around L hepatic lobe + cholelithiasis w/o evidence of cholecystitis. CT findings include an upper abdominal fluid collection without clear origin or etiology and possibly representing a hematoma without active extravasation, surrounding the liver, stomach, and spleen as well as 1.1 cm hypoattenuating lesion of the left hepatic lobe and cholelithiasis. He denies any recent trauma. He reports a history of multiple banding procedures of varices, multiple years ago. Denies any recent vomiting, melena, or bloody bowel movements. He has a history of HCV, likely 2/2 IVDU, and completed treatment years ago. At time of my interview,  he has no abdominal pain. Blood counts have been stable through transfer.     Patient transferred to [facility] for hepatology/IR evaluation.

## 2023-08-03 NOTE — PLAN OF CARE
[facility] - Surgery  Initial Discharge Assessment       Primary Care Provider: Katya, Primary Doctor    Admission Diagnosis: Intra-abdominal fluid collection [R18.8]    Admission Date: 8/3/2023  Expected Discharge Date: 8/6/2023         Payor: BLUE CROSS BLUE SHIELD / Plan: BCBS ALL OUT OF STATE / Product Type: PPO /     Extended Emergency Contact Information  Primary Emergency Contact: Maida Page  Mobile Phone: [phone]  Relation: Significant other  Preferred language: English  Secondary Emergency Contact: Marcella Arguello  Mobile Phone: [phone]  Relation: Sister  Preferred language: English    Discharge Plan A: Home with family  Discharge Plan B: Home Health, Home with family      [facility] ([place] - [place], [place] - [address]  [address]  [place] [address]  Phone: [phone] Fax: [phone]      Initial Assessment (most recent)       Adult Discharge Assessment - 08/03/23 1530          Discharge Assessment    Assessment Type Discharge Planning Assessment     Confirmed/corrected address, phone number and insurance Yes     Confirmed Demographics Correct on Facesheet     Source of Information patient     Communicated DONIS with patient/caregiver Yes     People in Home spouse     Do you expect to return to your current living situation? Yes     Do you have help at home or someone to help you manage your care at home? Yes     Who are your caregiver(s) and their phone number(s)? spouse     Prior to hospitilization cognitive status: Alert/Oriented     Current cognitive status: Alert/Oriented     Equipment Currently Used at Home none     Patient currently being followed by outpatient case management? No     Do you currently have service(s) that help you manage your care at home? No     Do you take prescription medications? Yes     Do you have prescription coverage? Yes     Do you have any problems affording any of your prescribed medications? No     Is the patient taking medications as prescribed?  yes     How do you get to doctors appointments? car, drives self     Are you on dialysis? No     Do you take coumadin? No     Discharge Plan A Home with family     Discharge Plan B Home Health;Home with family                   Patient lives with spouse . He does not feel he will need any post acute services. His wife is primary caregiver and will provide transportation home.

## 2023-08-03 NOTE — PHARMACY MED REC
"Admission Medication History     The home medication history was taken by Enid Lawrence.    You may go to "Admission" then "Reconcile Home Medications" tabs to review and/or act upon these items.     The home medication list has been updated by the Pharmacy department.   Please read ALL comments highlighted in yellow.   Please address this information as you see fit.    Feel free to contact us if you have any questions or require assistance.          Medications listed below were obtained from: Patient/family and Patient's pharmacy  PTA Medications   Medication Sig    acetaminophen (TYLENOL) 325 MG tablet Take 650 mg by mouth 2 (two) times daily as needed for Pain.    busPIRone (BUSPAR) 15 MG tablet Take 7.5 mg by mouth 2 (two) times daily.    hydroCHLOROthiazide (HYDRODIURIL) 25 MG tablet Take 25 mg by mouth 2 (two) times daily.    levothyroxine (SYNTHROID) 75 MCG tablet Take 75 mcg by mouth before breakfast.    nadoloL (CORGARD) 20 MG tablet Take 20 mg by mouth once daily.    pantoprazole (PROTONIX) 40 MG tablet Take 40 mg by mouth once daily.    sertraline (ZOLOFT) 50 MG tablet Take 50 mg by mouth once daily.       Enid Lawrence  EXT [id number]                  .          "

## 2023-08-03 NOTE — ASSESSMENT & PLAN NOTE
Patient with tongue swelling that started day of admission. White and green lesions on exam, looks dry. Dysphagia with dry tongue otherwise able to swallow. No hoarseness    - re-evaluate after IVF   - SLP consulted, recs appreciated

## 2023-08-03 NOTE — ASSESSMENT & PLAN NOTE
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 Reports chronic infection and completed treatment. Reactive antibody will always be present 2/2 to previous infection.     - no further workup needed at this time

## 2023-08-03 NOTE — ASSESSMENT & PLAN NOTE
53M PMH EtOH cirrhosis c/b esophageal varices s/p multiple banding procedures who presented to [facility] ED for right sided abdominal pain. Transferred to [facility] for hepatology and IR evaluation. RUQ US on 8/2 notable for cirrhotic liver with interval development (since 7/26/23) of multiloculated hypoechoic echogenicity around L hepatic lobe + cholelithiasis w/o evidence of cholecystitis. CT findings include an upper abdominal fluid collection without clear origin or etiology and possibly representing a hematoma without active extravasation, surrounding the liver, stomach, and spleen.     Recommendations:  - CT C/A/P with and without ordered by primary team per IR recommendations. F/u imaging for further qualification of collection with intervention via IR as clinically appropriate.

## 2023-08-03 NOTE — ASSESSMENT & PLAN NOTE
Mr. Page is a 53 YOM with cirrhosis 2/2 to EtOH use and HCV, esophageal varices, GIB, HTN, hypothyroidism, MDD, hydrocephalous, cholelithiasis, who presented to OSH for abdominal pain and is [place] [facility] [place] evaluation of intra-thoracic hematoma and hepatology evaluation. Presented for abdominal pain which has now resolved. CT A/P with IV contrast done and shows upper abdominal fluid collection likely a hematoma, surrounds the liver, stomach, and spleen. Origin is not identified however may represent a variceal or hepatic hematoma. No active extravasation identified. Patient transferred for IR and hepatology evaluation. History of past varices ligation. Has 1.1 cm hypoattenuating lesion in left hepatic lobe.     MELD 3.0: 14 at 8/3/2023  2:56 AM  MELD-Na: 11 at 8/3/2023  2:56 AM  Calculated from:  Serum Creatinine: 0.7 mg/dL (Using min of 1 mg/dL) at 8/3/2023  2:56 AM  Serum Sodium: 136 mmol/L at 8/3/2023  2:56 AM  Total Bilirubin: 2.2 mg/dL at 8/3/2023  2:56 AM  Serum Albumin: 2.4 g/dL at 8/3/2023  2:56 AM  INR(ratio): 1.2 at 8/3/2023  2:56 AM  Age at listing (hypothetical): 53 years  Sex: Male at 8/3/2023  2:56 AM      - IR consulted for hematoma, recs appreciated  - hepatology consulted, recs appreciated   - patient on nadolol at OSH, started on propranolol this admission  - continue pantoprazole 40 mg from OSH

## 2023-08-03 NOTE — LETTER
August 11, 2023                 [facility]  [address]  [place]  [address]  Phone: [phone]  Fax: [phone] August 11, 2023     Patient: Gregg Page   YOB: 1970       To Whom It May Concern:    Gregg Page was admitted to the hospital on 8/3/2023 12:53 AM and remains hospitalized as of 8/11/2023. His wife Maida Page has been present at bedside with him during this hospitalization. If you have any questions or concerns, please don't hesitate to call our office at [phone].      Sincerely,        Oliverio Camacho DO  Department of Hospital Medicine

## 2023-08-03 NOTE — NURSING
Nurses Note -- 4 Eyes      8/3/2023   1:00 AM      Skin assessed during: Admit      [] No Altered Skin Integrity Present    []Prevention Measures Documented      [x] Yes- Altered Skin Integrity Present or Discovered   [] LDA Added if Not in Epic (Describe Wound)   [] New Altered Skin Integrity was Present on Admit and Documented in LDA   [] Wound Image Taken    Wound Care Consulted? No    Patient has Psoriasis     Attending Nurse:  Eloina Lopez RN    Second RN/Staff Member:  Tayla Stratton RN

## 2023-08-03 NOTE — CONSULTS
[facility] - Surgery  Hepatology  Consult Note    Patient Name: Gregg Page  MRN: [id number]  Admission Date: 8/3/2023  Hospital Length of Stay: 0 days  Attending Provider: Rosalind Borden MD   Primary Care Physician: No, Primary Doctor  Principal Problem:Cirrhosis    Inpatient consult to Hepatology  Consult performed by: Anthony Zamora MD  Consult ordered by: Samara Farmer DO        Subjective:     HPI:  Mr. Page is a 53 YOM with cirrhosis 2/2 to EtOH use and HCV, esophageal varices, GIB, HTN, hypothyroidism, MDD, hydrocephalous, cholelithiasis, who presented to OSH for right sided abdominal pain. He has been seen in the ED multiple times since May 2023 for recurrent R-sided abdominal pain.   He was seen by [facility] ED for these symptoms. RUQ US on 8/2 notable for cirrhotic liver with interval development (since 7/26/23) of multiloculated hypoechoic echogenicity around L hepatic lobe + cholelithiasis w/o evidence of cholecystitis. CT findings include an upper abdominal fluid collection without clear origin or etiology and possibly representing a hematoma without active extravasation, surrounding the liver, stomach, and spleen as well as 1.1 cm hypoattenuating lesion of the left hepatic lobe and cholelithiasis. He denies any recent trauma. He reports a history of multiple banding procedures of varices, multiple years ago. Denies any recent vomiting, melena, or bloody bowel movements. He has a history of HCV, likely 2/2 IVDU, and completed treatment years ago. At time of my interview,  he has no abdominal pain. Blood counts have been stable through transfer.     Patient transferred to [facility] for hepatology/IR evaluation.           Review of Systems   Constitutional:  Negative for chills, fever and unexpected weight change.   HENT: Negative.     Eyes: Negative.    Respiratory:          Hiccups   Cardiovascular:  Negative for chest pain, palpitations and leg swelling.   Gastrointestinal:  Positive for abdominal  distention and abdominal pain. Negative for anal bleeding, blood in stool, constipation, diarrhea, nausea and vomiting.   Genitourinary: Negative.    Musculoskeletal: Negative.    Skin:  Negative for color change.   Allergic/Immunologic: Negative.    Neurological: Negative.        No past medical history on file.    No past surgical history on file.    Review of patient's allergies indicates:  No Known Allergies      Tobacco Use    Smoking status: Not on file    Smokeless tobacco: Not on file   Substance and Sexual Activity    Alcohol use: Not on file    Drug use: Not on file    Sexual activity: Not on file       No medications prior to admission.       Objective:     Vital Signs (Most Recent):  Temp: 98.3 °F (36.8 °C) (08/03/23 0045)  Pulse: 68 (08/03/23 0045)  Resp: 18 (08/03/23 0045)  BP: 135/61 (08/03/23 0045)  SpO2: 97 % (08/03/23 0045) Vital Signs (24h Range):  Temp:  [98.3 °F (36.8 °C)] 98.3 °F (36.8 °C)  Pulse:  [68-82] 68  Resp:  [18-19] 18  SpO2:  [91 %-97 %] 97 %  BP: (134-135)/(61-72) 135/61     Weight: 92.3 kg (203 lb 7.8 oz) (08/03/23 0045)  Body mass index is 28.38 kg/m².       Physical Exam  Vitals and nursing note reviewed.   Constitutional:       General: He is not in acute distress.     Appearance: Normal appearance.   Eyes:      General: No scleral icterus.     Extraocular Movements: Extraocular movements intact.      Pupils: Pupils are equal, round, and reactive to light.   Cardiovascular:      Rate and Rhythm: Normal rate and regular rhythm.      Pulses: Normal pulses.      Heart sounds: Normal heart sounds.   Pulmonary:      Effort: Pulmonary effort is normal.      Breath sounds: Normal breath sounds.   Abdominal:      General: There is distension.      Palpations: Abdomen is soft.      Tenderness: There is abdominal tenderness (mild, RUQ to superficial palpation). There is no guarding.   Musculoskeletal:         General: No swelling or tenderness.   Skin:     General: Skin is warm and dry.       Capillary Refill: Capillary refill takes less than 2 seconds.      Coloration: Skin is not jaundiced.   Neurological:      General: No focal deficit present.      Mental Status: He is alert and oriented to person, place, and time. Mental status is at baseline.            MELD 3.0: 14 at 8/3/2023  2:56 AM  MELD-Na: 11 at 8/3/2023  2:56 AM  Calculated from:  Serum Creatinine: 0.7 mg/dL (Using min of 1 mg/dL) at 8/3/2023  2:56 AM  Serum Sodium: 136 mmol/L at 8/3/2023  2:56 AM  Total Bilirubin: 2.2 mg/dL at 8/3/2023  2:56 AM  Serum Albumin: 2.4 g/dL at 8/3/2023  2:56 AM  INR(ratio): 1.2 at 8/3/2023  2:56 AM  Age at listing (hypothetical): 53 years  Sex: Male at 8/3/2023  2:56 AM      Significant Labs:  Labs within the past month have been reviewed.    Significant Imaging:  Labs: Reviewed      Assessment/Plan:     GI  * Cirrhosis  PMH involving Cirrhosis 2/2 EtOH use. Hx of Hep C, likely 2/2 IVDU. Complicated by esophageal varices requiring multiple banding procedures (most recently >1 year ago). On this presentation, he denies recent vomiting, retching, melena, jaundice. Exam with abdominal distension, mild RUQ tenderness on superficial palpation. Has required paracentesis once prior, roughly 3-4 years ago.     - Continue to monitor following further imaging  - Low concern for acute decompensation given history, exam, and labwork. Low suspicion for SBP at this time.     Abdominal fluid collection  53M PMH EtOH cirrhosis c/b esophageal varices s/p multiple banding procedures who presented to [facility] ED for right sided abdominal pain. Transferred to [facility] for hepatology and IR evaluation. RUQ US on 8/2 notable for cirrhotic liver with interval development (since 7/26/23) of multiloculated hypoechoic echogenicity around L hepatic lobe + cholelithiasis w/o evidence of cholecystitis. CT findings include an upper abdominal fluid collection without clear origin or etiology and possibly representing a hematoma without  active extravasation, surrounding the liver, stomach, and spleen. N    Recommendations:  - CT C/A/P triple phase with and without ordered by primary team per IR recommendations. F/u imaging for further qualification of collection with intervention via IR as clinically appropriate.      HCV (hepatitis C virus)  Likely 2/2 IVDU. Reports chronic infection and completed treatment. Reactive antibody will remain present 2/2 to previous infection.     - no further workup needed at this time        Thank you for your consult. I will follow-up with patient. Please contact us if you have any additional questions.    Anthony Zamora MD  Hepatology  [facility] - Surgery

## 2023-08-03 NOTE — HOSPITAL COURSE
Procal 0.59; blood cultures show NGTD. CT triple phase shows loculated ascites and no visualized liver lesion. IR drainage of presumed intra-abdominal abscess 8/4/2023; pending cxs. Cytology reveals elevated WBC at 8030; aerobic cx negative, gram stain negative. ID consulted and recommended repeating cell count with continued abx. Repeat abdominal fluid cytology shows WBC increased to 11,000. CT obtained, pending results and IR recs. Repeat CT on 8/8 shows slight improvement in perihepatic fluid and mild increased free abdominopelvic fluid; d/c'd metronidazole and started zosyn. IR drain exchange took place 8/9, repeated cx's obtained. MRI 8/9 shows peripherally enhancing loculated fluid collection surrounding the left hepatic lobe with drainage catheter in place with no obvious connection to the biliary system. Differential includes abscess versus loculated ascites. WBC improved with pip/tazo. Awaiting LTAC placement. Today, first day of minimal output from drains. Drain can be removed on output <20mL for 3 days in a row (on 8/15, put out 22mL). Pt is going to [facility] pending insurance approval. Pt admitted at [facility], pending insurance approval.

## 2023-08-03 NOTE — ASSESSMENT & PLAN NOTE
Patient presents with anemia 12.1 mcv 87 (normocytic anemia 12.6 at OSH). No known baseline hemoglobin. Patient with new hematoma formation.     Iron panel: iron 26, TIBC 250, transferrin 169, Ferritin 213.

## 2023-08-04 PROBLEM — R41.82 ALTERED MENTAL STATE: Status: ACTIVE | Noted: 2023-08-04

## 2023-08-04 PROBLEM — K65.1 INTRA-ABDOMINAL ABSCESS: Status: ACTIVE | Noted: 2023-08-03

## 2023-08-04 PROBLEM — K14.8 TONGUE LESION: Status: ACTIVE | Noted: 2023-08-03

## 2023-08-04 LAB
ALBUMIN SERPL BCP-MCNC: 2.2 G/DL (ref 3.5–5.2)
ALP SERPL-CCNC: 117 U/L (ref 55–135)
ALT SERPL W/O P-5'-P-CCNC: 17 U/L (ref 10–44)
AMMONIA PLAS-SCNC: 67 UMOL/L (ref 10–50)
ANION GAP SERPL CALC-SCNC: 10 MMOL/L (ref 8–16)
APPEARANCE FLD: NORMAL
AST SERPL-CCNC: 27 U/L (ref 10–40)
BASOPHILS # BLD AUTO: 0.06 K/UL (ref 0–0.2)
BASOPHILS NFR BLD: 0.4 % (ref 0–1.9)
BILIRUB SERPL-MCNC: 1.6 MG/DL (ref 0.1–1)
BODY FLD TYPE: NORMAL
BUN SERPL-MCNC: 10 MG/DL (ref 6–20)
CALCIUM SERPL-MCNC: 7.9 MG/DL (ref 8.7–10.5)
CHLORIDE SERPL-SCNC: 100 MMOL/L (ref 95–110)
CO2 SERPL-SCNC: 26 MMOL/L (ref 23–29)
COLOR FLD: YELLOW
CREAT SERPL-MCNC: 0.6 MG/DL (ref 0.5–1.4)
DIFFERENTIAL METHOD: ABNORMAL
EOSINOPHIL # BLD AUTO: 0.3 K/UL (ref 0–0.5)
EOSINOPHIL NFR BLD: 2.1 % (ref 0–8)
ERYTHROCYTE [DISTWIDTH] IN BLOOD BY AUTOMATED COUNT: 15.3 % (ref 11.5–14.5)
EST. GFR  (NO RACE VARIABLE): >60 ML/MIN/1.73 M^2
GLUCOSE SERPL-MCNC: 96 MG/DL (ref 70–110)
GRAM STN SPEC: NORMAL
GRAM STN SPEC: NORMAL
HCT VFR BLD AUTO: 36.3 % (ref 40–54)
HGB BLD-MCNC: 12 G/DL (ref 14–18)
IMM GRANULOCYTES # BLD AUTO: 0.41 K/UL (ref 0–0.04)
IMM GRANULOCYTES NFR BLD AUTO: 2.5 % (ref 0–0.5)
LYMPHOCYTES # BLD AUTO: 1.4 K/UL (ref 1–4.8)
LYMPHOCYTES NFR BLD: 8.5 % (ref 18–48)
MAGNESIUM SERPL-MCNC: 2.1 MG/DL (ref 1.6–2.6)
MCH RBC QN AUTO: 28.9 PG (ref 27–31)
MCHC RBC AUTO-ENTMCNC: 33.1 G/DL (ref 32–36)
MCV RBC AUTO: 88 FL (ref 82–98)
MONOCYTES # BLD AUTO: 2 K/UL (ref 0.3–1)
MONOCYTES NFR BLD: 12.4 % (ref 4–15)
MONOS+MACROS NFR FLD MANUAL: 6 %
NEUTROPHILS # BLD AUTO: 12.1 K/UL (ref 1.8–7.7)
NEUTROPHILS NFR BLD: 74.1 % (ref 38–73)
NEUTROPHILS NFR FLD MANUAL: 94 %
NRBC BLD-RTO: 0 /100 WBC
PHOSPHATE SERPL-MCNC: 2.9 MG/DL (ref 2.7–4.5)
PLATELET # BLD AUTO: 100 K/UL (ref 150–450)
PMV BLD AUTO: 10.7 FL (ref 9.2–12.9)
POTASSIUM SERPL-SCNC: 3.2 MMOL/L (ref 3.5–5.1)
PROT SERPL-MCNC: 5.3 G/DL (ref 6–8.4)
RBC # BLD AUTO: 4.15 M/UL (ref 4.6–6.2)
SODIUM SERPL-SCNC: 136 MMOL/L (ref 136–145)
WBC # BLD AUTO: 16.28 K/UL (ref 3.9–12.7)
WBC # FLD: 8030 /CU MM

## 2023-08-04 PROCEDURE — 85025 COMPLETE CBC W/AUTO DIFF WBC: CPT

## 2023-08-04 PROCEDURE — 25000003 PHARM REV CODE 250: Performed by: STUDENT IN AN ORGANIZED HEALTH CARE EDUCATION/TRAINING PROGRAM

## 2023-08-04 PROCEDURE — 87522 HEPATITIS C REVRS TRNSCRPJ: CPT | Performed by: STUDENT IN AN ORGANIZED HEALTH CARE EDUCATION/TRAINING PROGRAM

## 2023-08-04 PROCEDURE — 87075 CULTR BACTERIA EXCEPT BLOOD: CPT | Performed by: STUDENT IN AN ORGANIZED HEALTH CARE EDUCATION/TRAINING PROGRAM

## 2023-08-04 PROCEDURE — 87102 FUNGUS ISOLATION CULTURE: CPT | Performed by: STUDENT IN AN ORGANIZED HEALTH CARE EDUCATION/TRAINING PROGRAM

## 2023-08-04 PROCEDURE — 21400001 HC TELEMETRY ROOM

## 2023-08-04 PROCEDURE — 99233 PR SUBSEQUENT HOSPITAL CARE,LEVL III: ICD-10-PCS | Mod: ,,, | Performed by: HOSPITALIST

## 2023-08-04 PROCEDURE — 99233 SBSQ HOSP IP/OBS HIGH 50: CPT | Mod: ,,, | Performed by: HOSPITALIST

## 2023-08-04 PROCEDURE — 87205 SMEAR GRAM STAIN: CPT | Performed by: STUDENT IN AN ORGANIZED HEALTH CARE EDUCATION/TRAINING PROGRAM

## 2023-08-04 PROCEDURE — 36415 COLL VENOUS BLD VENIPUNCTURE: CPT

## 2023-08-04 PROCEDURE — 36415 COLL VENOUS BLD VENIPUNCTURE: CPT | Performed by: STUDENT IN AN ORGANIZED HEALTH CARE EDUCATION/TRAINING PROGRAM

## 2023-08-04 PROCEDURE — 25000003 PHARM REV CODE 250

## 2023-08-04 PROCEDURE — 11000001 HC ACUTE MED/SURG PRIVATE ROOM

## 2023-08-04 PROCEDURE — 63600175 PHARM REV CODE 636 W HCPCS: Performed by: RADIOLOGY

## 2023-08-04 PROCEDURE — 82140 ASSAY OF AMMONIA: CPT | Performed by: STUDENT IN AN ORGANIZED HEALTH CARE EDUCATION/TRAINING PROGRAM

## 2023-08-04 PROCEDURE — 83735 ASSAY OF MAGNESIUM: CPT

## 2023-08-04 PROCEDURE — 63600175 PHARM REV CODE 636 W HCPCS: Performed by: STUDENT IN AN ORGANIZED HEALTH CARE EDUCATION/TRAINING PROGRAM

## 2023-08-04 PROCEDURE — 89051 BODY FLUID CELL COUNT: CPT | Performed by: STUDENT IN AN ORGANIZED HEALTH CARE EDUCATION/TRAINING PROGRAM

## 2023-08-04 PROCEDURE — 87070 CULTURE OTHR SPECIMN AEROBIC: CPT | Performed by: STUDENT IN AN ORGANIZED HEALTH CARE EDUCATION/TRAINING PROGRAM

## 2023-08-04 PROCEDURE — 84100 ASSAY OF PHOSPHORUS: CPT

## 2023-08-04 PROCEDURE — 80053 COMPREHEN METABOLIC PANEL: CPT

## 2023-08-04 RX ORDER — FENTANYL CITRATE 50 UG/ML
INJECTION, SOLUTION INTRAMUSCULAR; INTRAVENOUS
Status: COMPLETED | OUTPATIENT
Start: 2023-08-04 | End: 2023-08-04

## 2023-08-04 RX ORDER — POTASSIUM CHLORIDE 750 MG/1
30 CAPSULE, EXTENDED RELEASE ORAL
Status: COMPLETED | OUTPATIENT
Start: 2023-08-04 | End: 2023-08-04

## 2023-08-04 RX ORDER — ACETAMINOPHEN 325 MG/1
650 TABLET ORAL EVERY 4 HOURS
Status: DISPENSED | OUTPATIENT
Start: 2023-08-04 | End: 2023-08-05

## 2023-08-04 RX ORDER — METRONIDAZOLE 500 MG/100ML
500 INJECTION, SOLUTION INTRAVENOUS
Status: DISCONTINUED | OUTPATIENT
Start: 2023-08-04 | End: 2023-08-08

## 2023-08-04 RX ORDER — MIDAZOLAM HYDROCHLORIDE 1 MG/ML
INJECTION INTRAMUSCULAR; INTRAVENOUS
Status: COMPLETED | OUTPATIENT
Start: 2023-08-04 | End: 2023-08-04

## 2023-08-04 RX ADMIN — NYSTATIN 500000 UNITS: 100000 SUSPENSION ORAL at 11:08

## 2023-08-04 RX ADMIN — BUSPIRONE HYDROCHLORIDE 7.5 MG: 5 TABLET ORAL at 08:08

## 2023-08-04 RX ADMIN — ACETAMINOPHEN 650 MG: 325 TABLET ORAL at 11:08

## 2023-08-04 RX ADMIN — FENTANYL CITRATE 25 MCG: 50 INJECTION, SOLUTION INTRAMUSCULAR; INTRAVENOUS at 02:08

## 2023-08-04 RX ADMIN — PROPRANOLOL HYDROCHLORIDE 10 MG: 10 TABLET ORAL at 08:08

## 2023-08-04 RX ADMIN — LEVOTHYROXINE SODIUM 75 MCG: 75 TABLET ORAL at 06:08

## 2023-08-04 RX ADMIN — FENTANYL CITRATE 50 MCG: 50 INJECTION, SOLUTION INTRAMUSCULAR; INTRAVENOUS at 02:08

## 2023-08-04 RX ADMIN — MIDAZOLAM HYDROCHLORIDE 0.5 MG: 2 INJECTION, SOLUTION INTRAMUSCULAR; INTRAVENOUS at 02:08

## 2023-08-04 RX ADMIN — METRONIDAZOLE 500 MG: 500 INJECTION, SOLUTION INTRAVENOUS at 04:08

## 2023-08-04 RX ADMIN — POTASSIUM BICARBONATE 20 MEQ: 391 TABLET, EFFERVESCENT ORAL at 09:08

## 2023-08-04 RX ADMIN — PANTOPRAZOLE SODIUM 40 MG: 40 TABLET, DELAYED RELEASE ORAL at 09:08

## 2023-08-04 RX ADMIN — METOCLOPRAMIDE 10 MG: 5 TABLET ORAL at 11:08

## 2023-08-04 RX ADMIN — NYSTATIN 500000 UNITS: 100000 SUSPENSION ORAL at 08:08

## 2023-08-04 RX ADMIN — MIDAZOLAM HYDROCHLORIDE 1 MG: 2 INJECTION, SOLUTION INTRAMUSCULAR; INTRAVENOUS at 02:08

## 2023-08-04 RX ADMIN — POTASSIUM CHLORIDE 30 MEQ: 10 CAPSULE, COATED, EXTENDED RELEASE ORAL at 02:08

## 2023-08-04 RX ADMIN — SERTRALINE HYDROCHLORIDE 25 MG: 25 TABLET ORAL at 09:08

## 2023-08-04 RX ADMIN — POTASSIUM BICARBONATE 20 MEQ: 391 TABLET, EFFERVESCENT ORAL at 11:08

## 2023-08-04 RX ADMIN — BACLOFEN 5 MG: 5 TABLET ORAL at 09:08

## 2023-08-04 RX ADMIN — NYSTATIN 500000 UNITS: 100000 SUSPENSION ORAL at 04:08

## 2023-08-04 RX ADMIN — PROPRANOLOL HYDROCHLORIDE 10 MG: 10 TABLET ORAL at 09:08

## 2023-08-04 RX ADMIN — NYSTATIN 500000 UNITS: 100000 SUSPENSION ORAL at 12:08

## 2023-08-04 RX ADMIN — BUSPIRONE HYDROCHLORIDE 7.5 MG: 5 TABLET ORAL at 09:08

## 2023-08-04 RX ADMIN — METRONIDAZOLE 500 MG: 500 INJECTION, SOLUTION INTRAVENOUS at 08:08

## 2023-08-04 RX ADMIN — METOCLOPRAMIDE 10 MG: 5 TABLET ORAL at 12:08

## 2023-08-04 RX ADMIN — POTASSIUM CHLORIDE 30 MEQ: 10 CAPSULE, COATED, EXTENDED RELEASE ORAL at 12:08

## 2023-08-04 RX ADMIN — ACETAMINOPHEN 650 MG: 325 TABLET ORAL at 09:08

## 2023-08-04 RX ADMIN — CEFTRIAXONE 2 G: 2 INJECTION, POWDER, FOR SOLUTION INTRAMUSCULAR; INTRAVENOUS at 12:08

## 2023-08-04 NOTE — ASSESSMENT & PLAN NOTE
Patient with new hiccups for one week while at OSH. possibly 2/2 from loculated ascites causing irritation to phrenic nerve     Improved temporarily with reglan overnight.    PLAN:  - baclofen 5 TID prn   - reassess post drainage

## 2023-08-04 NOTE — ASSESSMENT & PLAN NOTE
Wife reports hallucinations and change in behavior since onset. No known prior episodes. DDx includes hepatic encephalopathy versus delirium    PLAN:  - follow up ammonia level  - delirium precautions

## 2023-08-04 NOTE — PROCEDURES
Radiology Post-Procedure Note    Pre Op Diagnosis: Perihepatic fluid collection  Post Op Diagnosis: Same    Procedure: US-guided drain placement    Procedure performed by: Ezekiel Cortez MD    Written Informed Consent Obtained: Yes  Specimen Removed: YES ~400 mL of serous fluid aspirated  Estimated Blood Loss: Minimal    Findings:   Stable multiloculated perihepatic fluid collection.     Patient tolerated procedure well.    Ezekiel Cortez MD  Interventional Radiology  Department of Radiology

## 2023-08-04 NOTE — PT/OT/SLP PROGRESS
Speech Language Pathology      Gregg Page  MRN: [id number]    Patient not seen today secondary to NPO for upcoming procedure. Will follow-up per SLP POC.      8/4/2023

## 2023-08-04 NOTE — PLAN OF CARE
See IR consult noted dated 8/3/23. CT a/p obtained on 8/3/23 reviewed with Dr. Holder- shows a large, loculated collection around the left hepatic lobe and stomach with surrounding mesenteric edema which contains collaterals throughout, as well as a very small amount of simple appearing ascites in the RLQ. In the absence of risk factors for hematoma formation such as trauma, recent abdominal procedures, and blood thinners, hepatology/HM suspects perihepatic collection is more likely an abscess rather than a hematoma. Discussed with hepatology and ordering team. Plan to proceed with CT guided drain placement into the perihepatic collection under moderate sedation. Of note, procedure will be high risk for bleeding due to surrounding collateral vessels    Plan to proceed with CT guided drain placement into perihepatic collection under moderate sedation on 8/4/23  Primary team to order any labs/cultures to be drawn on the fluid that is aspirated during the procedure  Please keep pt NPO  Anticoagulation reviewed  Coag labs reviewed.  Please contact with questions via [employer] secure chat or spectra link    ASA: 2  Mallampati: 2    Tanya Graff PA-C  Interventional Radiology   Spectra: [phone]

## 2023-08-04 NOTE — PLAN OF CARE
SW met with the pt's spouse near bedside to discuss financial assistance needs. Pt's spouse expressed need for support for hotel. SW provided resources to best support.     Faye Weaver LMSW  Case Management   [facility]   Ext. [phone]

## 2023-08-04 NOTE — NURSING
Patient transported off unit via wheelchair for xray and CT. Patient provided 8oz of water from 9939-3308 per CT tech orders, for impending CT. Patient provided another 8oz of water from 9635-2461 for CT. Patient sent off floor with water to continue to drink for CT. IV s/l at this time.

## 2023-08-04 NOTE — ASSESSMENT & PLAN NOTE
MELD 3.0: 13 at 8/4/2023  3:34 AM  MELD-Na: 10 at 8/4/2023  3:34 AM  Calculated from:  Serum Creatinine: 0.6 mg/dL (Using min of 1 mg/dL) at 8/4/2023  3:34 AM  Serum Sodium: 136 mmol/L at 8/4/2023  3:34 AM  Total Bilirubin: 1.6 mg/dL at 8/4/2023  3:34 AM  Serum Albumin: 2.2 g/dL at 8/4/2023  3:34 AM  INR(ratio): 1.2 at 8/3/2023  2:56 AM  Age at listing (hypothetical): 53 years  Sex: Male at 8/4/2023  3:34 AM    - hepatology consulted, recs appreciated   - patient on nadolol at OSH, started on propranolol this admission  - continue pantoprazole 40 mg from OSH

## 2023-08-04 NOTE — MEDICAL/APP STUDENT
[facility] Medicine  Progress Note    Patient Name: Gregg Page  MRN: [id number]  Patient Class: IP- Inpatient   Admission Date: 8/3/2023  Length of Stay: 1 days  Attending Physician: Rosalind Borden MD  Primary Care Provider: Katya, Primary Doctor        Subjective:     Principal Problem:Intra-abdominal abscess        HPI:  Pt is 53 y.o. male w/pmx of cirrhosis 2/2 to HCV and EtOH use, esophageal varices w/ band ligation, recurrent cholelithiasis, HTN, hypothyroidism, psoriasis, MDD, and hydrocephalus, who presented to [facility] for abdominal and chest pain. Pt was transferred to [facility] for IR evaluation of intra-abdominal fluid collection possibly a variceal or hepatic hematoma. Patient was hospitalized in June for similar presentation which lead to an MRCP being done. Pt reports the abdominal pain has been present for 6 days prior to presentation at [facility]. Denies BRBPR, melena, n/v or diarrhea. The abdominal pain is not continuing and CP getting better on admission. Pt complains of intractable hiccups which started with the abdominal pain and also has tongue swelling and pain. Denied dysphagia. No recent procedure, livery biopsy, and trauma to be noted. Pt states that the abdominal pain might have been contributed by use of testosterone injection that pt purchased on the Internet.    Referring facility/physician notes vitals stable. Labs concerning with WBC at 17.9, Hb 12.6, Hct 37.5, platelets 123. Ammonia level 50, PT-INR 1.31, Troponin and BNP WNL. UA negative and CXR without any acute process.     CT A/P w contrast shows intra-abdominal fluid suggesting of hematoma that surrounds the left hepatic lobe, stomach, and spleen. No active extravasation identified. Measurement of 21.7cm or greater in transverse diameter. 1.1 Cm hypo attenuating lesion seen on left hepatic lobe. Cholelithiasis without signs of cholecystis.     Hepatology and IR was consulted for further recs.  Problem: Self Care Deficits Care Plan (Adult)  Goal: *Acute Goals and Plan of Care (Insert Text)  Description: FUNCTIONAL STATUS PRIOR TO ADMISSION: Patient was modified independent using a single point cane at times for functional mobility. Performed ADLs on his own but struggles with donning socks and shoes    HOME SUPPORT PRIOR TO ADMISSION: The patient lived with wife and granddaughter checks on pt. Occupational Therapy Goals:  Initiated 9/19/2022  1. Patient will perform grooming standing with supervision/set-up within 7 days. 2. Patient will perform lower body dressing with supervision/set-up within 7 days. 3. Patient will perform toileting with supervision/set-up within 7 days. 4. Patient will transfer from toilet with supervision/set-up using the least restrictive device and appropriate durable medical equipment within 7 days. Outcome: Not Met   OCCUPATIONAL THERAPY EVALUATION  Patient: Lincoln Lock (80 y.o. male)  Date: 9/19/2022  Primary Diagnosis: [facility] exacerbation ([facility] [I50.9]       Precautions:  Fall (COVID +, droplet +)    ASSESSMENT  Based on the objective data described below, the patient presents with functional weakness and inability to perform LB ADLS at at this time. Pt reports recent decline in function and endorses LE weakness. Moderate assist x2 needed for sit to stand and min assist to side step edge of bed. Pt needed to immediately sit after side stepping head of bed. He was unable to reach LE this session and may benefit from AE training. Recommend rehab at discharge. Pt was on room air and all vitals were stable this session. Current Level of Function Impacting Discharge (ADLs/self-care): min to moderate assist bed mobility, moderate assist x2 sit to stand and min assist to side step head of bed with hand held assist x2    Functional Outcome Measure:   The patient scored 40/100 on the barthel outcome measure which is indicative of moderate decline in     Overview/Hospital Course:  08/03 On admission, labs ordered and concerning for Procal of 0.59, H/H at 12.1 and 25.6, WBC of 15.24, platelet of 101, potassium at 2.9. Blood culture ordered. CXR shows left lower lobe yeison opacity. Per IR, ordered CT a/p triple phase. 8/4: CT showed large loculated simple fluid density collection surrounding liver, spleen and lesser sac of stomach. Also showed portal hypertension with splenomegaly. No hepatic mass seen. Per Hep and IR scan suggestive of abscess. Started on Ceftriaxone, and flagyl. Planned for draining of abscess with IR    Interval History: NAEO, Pt complained of new left upper quadrant pain 5/10 in severity. Still continues to have abdominal distension. Tongue pain and lesion seem to be improving. No improvement in hiccups. Pt's wife complains of AMS like symptoms. Delirium precaution and ammonia ordered.     Review of Systems   Constitutional: Negative.    HENT:  Positive for mouth sores. Negative for trouble swallowing.         Tongue pain and intractable hiccups   Eyes: Negative.    Respiratory:  Negative for shortness of breath.    Cardiovascular: Negative.    Gastrointestinal:  Positive for abdominal distention and abdominal pain. Negative for diarrhea, nausea and vomiting.        RUQ and LUQ pain   Endocrine: Negative.    Genitourinary: Negative.    Musculoskeletal: Negative.    Skin:  Positive for rash.        Psoriasis rash abdomen   Allergic/Immunologic: Negative.    Neurological: Negative.    Hematological: Negative.    Psychiatric/Behavioral: Negative.       Objective:     Vitals:    08/04/23 1352 08/04/23 1400 08/04/23 1405 08/04/23 1410   BP: 119/75 124/69 120/70 121/72   BP Location:  Right arm Right arm    Patient Position:       Pulse: 69 71 73 75   Resp: 16 20 20 20   Temp:       TempSrc:       SpO2: 95% 95% 95% 95%   Weight:       Height:          Weight: 92.3 kg (203 lb 7.8 oz)  Body mass index is 28.38 kg/m².    Intake/Output Summary (Last 24  mobility and ADLS. Other factors to consider for discharge: has multiple steps to manage at home, high fall risk, pt reports that he has a difficulty reading at baseline (not related to vision)     Patient will benefit from skilled therapy intervention to address the above noted impairments. PLAN :  Recommendations and Planned Interventions: self care training, functional mobility training, therapeutic exercise, balance training, therapeutic activities, patient education, home safety training, and family training/education    Frequency/Duration: Patient will be followed by occupational therapy 3 times a week to address goals. Recommendation for discharge: (in order for the patient to meet his/her long term goals)  Therapy up to 5 days/week in SNF setting    This discharge recommendation:  Has not yet been discussed the attending provider and/or case management    IF patient discharges home will need the following DME: rolling walker, bedside commode       SUBJECTIVE:   Patient stated It has been hard to stand up.     OBJECTIVE DATA SUMMARY:   HISTORY:   Past Medical History:   Diagnosis Date    Arthritis     CAD (coronary artery disease)     Carotid artery disease ([facility]     CHF exacerbation ([facility] 9/19/2022    Diabetes ([facility]     Diabetic neuropathy ([facility]     Hypercholesterolemia     Hypertension 11/29/10    Shingles 2010    Stroke ([facility]     Thromboembolus [facility])      Past Surgical History:   Procedure Laterality Date    HX CARPAL TUNNEL RELEASE  left    HX CORONARY ARTERY BYPASS GRAFT      HX HEART CATHETERIZATION  12/2005    Dr Parviz Ureña      left arm fracture    WI COLONOSCOPY FLX DX W/COLLJ SPEC WHEN PFRMD  11/12/2012            Expanded or extensive additional review of patient history:     Home Situation  Home Environment: Other (comment) (Trace Ureña)  # Steps to Enter: 0  One/Two Story Residence:  (tri level, bedroom on third floor)  Living Alone: No  Support Systems: Spouse/Significant Other (wife and granddaughter checks on pt)  Current DME Used/Available at Home: Cane, straight (shower chair but doesn't use)  Tub or Shower Type: Tub/Shower combination    Hand dominance: Right    EXAMINATION OF PERFORMANCE DEFICITS:  Cognitive/Behavioral Status:  Neurologic State: Alert  Orientation Level: Oriented to person;Oriented to place  Cognition: Follows commands  Perception: Cues to maintain midline in standing  Perseveration: No perseveration noted  Safety/Judgement: Fall prevention    Hearing: Auditory  Auditory Impairment: None    Vision/Perceptual:                           Acuity: Within Defined Limits    Corrective Lenses: Reading glasses    Range of Motion:    AROM: Generally decreased, functional  PROM: Generally decreased, functional                      Strength:    Strength: Generally decreased, functional                Coordination:  Coordination: Generally decreased, functional  Fine Motor Skills-Upper: Left Intact; Right Intact    Gross Motor Skills-Upper: Left Intact; Right Intact    Tone & Sensation:    Tone: Normal  Sensation: Impaired (LE)                      Balance:  Sitting: Intact  Standing: Impaired  Standing - Static: Constant support; Fair  Standing - Dynamic : Constant support; Fair    Functional Mobility and Transfers for ADLs:  Bed Mobility:  Rolling: Minimum assistance  Supine to Sit: Moderate assistance (.)  Sit to Supine: Moderate assistance  Scooting: Maximum assistance    Transfers:  Sit to Stand: Moderate assistance; Additional time;Assist x2  Stand to Sit: Minimum assistance; Additional time  Bed to Chair: Minimum assistance; Additional time;Assist x2 (hand held assist side stepping)  Bathroom Mobility:  (unable at this time)  Toilet Transfer : Moderate assistance;Minimum assistance; Additional time (stand pivot)    ADL Assessment:  Feeding: Setup    Oral Facial Hygiene/Grooming: Setup (seated, unable to perform standing at this time)    Bathing:  Moderate hours) at 8/4/2023 1413  Last data filed at 8/4/2023 1408  Gross per 24 hour   Intake 240 ml   Output 450 ml   Net -210 ml     Physical Exam  HENT:      Mouth/Throat:      Pharynx: Posterior oropharyngeal erythema present.      Comments: Right side Tongue lesion  Cardiovascular:      Rate and Rhythm: Normal rate and regular rhythm.      Pulses: Normal pulses.      Heart sounds: Normal heart sounds. No murmur heard.  Pulmonary:      Effort: Pulmonary effort is normal.      Breath sounds: Normal breath sounds.   Abdominal:      General: There is distension.      Palpations: There is no mass.      Tenderness: There is abdominal tenderness. There is no guarding.   Musculoskeletal:         General: No swelling.   Skin:     General: Skin is warm.      Capillary Refill: Capillary refill takes less than 2 seconds.      Coloration: Skin is not jaundiced.   Neurological:      Mental Status: He is alert and oriented to person, place, and time.   Psychiatric:         Mood and Affect: Mood normal.         Significant Labs: All pertinent labs within the past 24 hours have been reviewed.  Blood Culture:   Recent Labs   Lab 08/03/23  1422 08/03/23  1424   LABBLOO No Growth to date No Growth to date     CBC:   Recent Labs   Lab 08/03/23  0256 08/04/23  0334   WBC 15.24* 16.28*   HGB 12.1* 12.0*   HCT 35.6* 36.3*   * 100*     CMP:   Recent Labs   Lab 08/03/23  0256 08/03/23  1607 08/04/23  0334     135* 133* 136   K 2.9*  2.9* 3.0* 3.2*   CL 99  97 99 100   CO2 26  27 27 26   *  112* 94 96   BUN 14  13 11 10   CREATININE 0.7  0.7 0.6 0.6   CALCIUM 8.2*  8.0* 7.9* 7.9*   PROT 5.7*  5.7*  --  5.3*   ALBUMIN 2.4*  2.4*  --  2.2*   BILITOT 2.2*  2.2*  --  1.6*   ALKPHOS 120  119  --  117   AST 32  32  --  27   ALT 20  21  --  17   ANIONGAP 11  11 7* 10     Coagulation:   Recent Labs   Lab 08/02/23  1628 08/03/23  0256   INR 1.31* 1.2   APTT 32.2  --      Magnesium:   Recent Labs   Lab 08/03/23  0256  08/04/23  0334   MG 2.1  2.0 2.1     Recent Labs     08/03/23  1213   PROCAL 0.59*       HIV 1/2 Ag/Ab   Date Value Ref Range Status   08/03/2023 Non-reactive Non-reactive Final           Significant Imaging: I have reviewed all pertinent imaging results/findings within the past 24 hours.    CT Chest Abdomen Pelvis With Contrast (xpd)    Result Date: 8/4/2023    1. Large loculated simple fluid density collection surrounding the majority of the left hepatic lobe, extending to the superior hilum of the spleen, tracking along the lesser sac of the stomach, and abutting the medial margin of the gallbladder, likely loculated ascites.  2. Cholelithiasis without gallbladder wall thickening to suggest cholecystitis.  Trace pericholecystic fluid thought to reflect ascites.  3. Cirrhosis with sequelae of portal hypertension including splenomegaly, ascites, gastroesophageal varices, splenic collaterals, and patent umbilical vein.  4. Trace left pleural effusion.  5. No convincing hepatic masses noting arterial phase is not obtained.  This report was flagged in Epic as abnormal.    Electronically signed by resident: Allie De Jesus  Date:    08/04/2023  Time:    08:03    Electronically signed by: Devon Haynes MD  Date:    08/04/2023  Time:    09:01      X-Ray Chest PA And Lateral    Result Date: 8/3/2023    Left lower lobe patchy opacity could represent atelectasis versus infiltrate      Electronically signed by: Carlos Cabello MD  Date:    08/03/2023  Time:    15:33          Assessment/Plan:      * Intra-abdominal abscess   Pt is 53 y.o. male w/pmx of cirrhosis 2/2 to HCV and EtOH use, esophageal varices w/ band ligation, recurrent cholelithiasis, HTN, hypothyroidism, psoriasis, MDD, and hydrocephalus currently being worked up for intra-abdominal fluids seen on imaging.  CT Triple phase showed large loculated simple fluid density collection surrounding liver, spleen and lesser sac of stomach. Singes of portal hypertension, and  assistance      Upper Body Dressing: Setup    Lower Body Dressing: Maximum assistance    Toileting: Maximum assistance                  ADL Intervention and task modifications:     Min assist seated edge of bed to change gown    Total assist socks    Cognitive Retraining  Safety/Judgement: Fall prevention    Functional Measure:    Barthel Index:  Bathin  Bladder: 5  Bowels: 5  Groomin  Dressin  Feeding: 10  Mobility: 0  Stairs: 0  Toilet Use: 5  Transfer (Bed to Chair and Back): 5  Total: 40/100      The Barthel ADL Index: Guidelines  1. The index should be used as a record of what a patient does, not as a record of what a patient could do. 2. The main aim is to establish degree of independence from any help, physical or verbal, however minor and for whatever reason. 3. The need for supervision renders the patient not independent. 4. A patient's performance should be established using the best available evidence. Asking the patient, friends/relatives and nurses are the usual sources, but direct observation and common sense are also important. However direct testing is not needed. 5. Usually the patient's performance over the preceding 24-48 hours is important, but occasionally longer periods will be relevant. 6. Middle categories imply that the patient supplies over 50 per cent of the effort. 7. Use of aids to be independent is allowed. Score Interpretation (from [address] [address])    Independent   60-79 Minimally independent   40-59 Partially dependent   20-39 Very dependent   <20 Totally dependent     -Cori Buitrago., Barthel, D.W. (1965). Functional evaluation: the Barthel Index. [address] [facility] ([address] (). The Barthel activities of daily living index: self-reporting versus actual performance in the old (> or = 75 years). Journal of [address] [place] [address] 45(7), [facility], JSERGEI, Dylan Boucher., García Ulrich. (1999).  Measuring the change in disability after inpatient rehabilitation; comparison of the responsiveness of the Barthel Index and Functional [place] Measure. Journal of Neurology, Neurosurgery, and Psychiatry, 66(4), 343-200. JAXON Min, JULIA Klein, & Dee Payton M.A. (2004) Assessment of post-stroke quality of life in cost-effectiveness studies: The usefulness of the Barthel Index and the EuroQoL-5D. Quality of Life Research, 15, 162-86         Occupational Therapy Evaluation Charge Determination   History Examination Decision-Making   LOW Complexity : Brief history review  MEDIUM Complexity : 3-5 performance deficits relating to physical, cognitive , or psychosocial skils that result in activity limitations and / or participation restrictions MEDIUM Complexity : Patient may present with comorbidities that affect occupational performnce. Miniml to moderate modification of tasks or assistance (eg, physical or verbal ) with assesment(s) is necessary to enable patient to complete evaluation       Based on the above components, the patient evaluation is determined to be of the following complexity level: MEDIUM  Pain Rating:  Mild LE pain, burning     Activity Tolerance:   Fair and requires rest breaks    After treatment patient left in no apparent distress:    Supine in bed, Heels elevated for pressure relief, Call bell within reach, Bed / chair alarm activated, and Side rails x 3    COMMUNICATION/EDUCATION:   The patients plan of care was discussed with: Physical therapist, Registered nurse, and patient . Home safety education was provided and the patient/caregiver indicated understanding., Patient/family have participated as able in goal setting and plan of care. , and Patient/family agree to work toward stated goals and plan of care. This patients plan of care is appropriate for delegation to [facility].     Thank you for this referral.  RICYK Kirkpatrick/ETHAN  Time Calculation: 22 mins cholelithiasis with no signs of cholecystitis.  IR and Hep consulted for further management.          PLAN:  IR and Hep recs: Due to absence of recent trauma and procedures, mostly likely an abscess.    - Planning for drainage by IR   - follow up intraop cultures and cytology    - Started antibiotics with Ceftriaxone and Flagyl   - ID consulted, appreciate recs    Altered mental state  Pt's wife reports of changed behavior. Ddx: hepatic encephalopathy or delirium. Ammonia level measured in OMS at 50.     PLAN:  - follow up ammonia level  - delirium precautions  - Stopped Reglan and baclofen which might contribute to AMS symptoms      Tongue lesion  Pt presented with white lesions and pain on admission. No dysphagia or hoarseness noted. Started on Nystatin and magic mouthwash.     - SLP consulted  - Continue Nystatin and Magic Mouthwash PRN    Leukocytosis  Pt had a WBC of 17.9 on admission. Currently trending at 16.28. Hemodynamically stable. No signs of sepsis noted. Possibly due to presence of intra-abdominal fluids, abscess, or stress    - Started on Ceftriaxone and flagyl considering abscess picture    HTN (hypertension)     - hold home amlodipine 5   - hold home HCTZ 25     Gallstones  Cholelithiasis without wall thickening or signs of cystitis     - Daily CMP to monitor ALP and bili    Hiccups  Pt presented with intractable hiccups that started with the abdominal pain. Possibly due to irritation of phrenic nerve or diaphragm by the intra-abdominal fluid     Tried Reglan and baclofen which resulted in minimal improvment    PLAN:  - D/C baclofen and Reglan due to AMS  - reassess post drainage    HCV (hepatitis C virus)  Patient with history HCV    - HCV RNA pending      MDD (major depressive disorder)  - continue home sertraline 50 mg   - continue buspirone 7.5 mg BID    Cirrhosis    MELD 3.0: 13 at 8/4/2023  3:34 AM  MELD-Na: 10 at 8/4/2023  3:34 AM  Calculated from:  Serum Creatinine: 0.6 mg/dL (Using min of 1  mg/dL) at 8/4/2023  3:34 AM  Serum Sodium: 136 mmol/L at 8/4/2023  3:34 AM  Total Bilirubin: 1.6 mg/dL at 8/4/2023  3:34 AM  Serum Albumin: 2.2 g/dL at 8/4/2023  3:34 AM  INR(ratio): 1.2 at 8/3/2023  2:56 AM  Age at listing (hypothetical): 53 years  Sex: Male at 8/4/2023  3:34 AM    - hepatology consulted, recs appreciated   - patient on home nadolol. started on propranolol this admission  - continue pantoprazole 40 mg from OSH       Hypothyroidism  - continue home synthroid 75 mcg    Normocytic anemia  Patient presents with anemia 12.1 mcv 87 (normocytic anemia 12.6 at OSH). No known baseline hemoglobin. Patient with new hematoma formation.     Iron panel: iron 26, TIBC 250, transferrin 169, Ferritin 213.   VTE Risk Mitigation (From admission, onward)           Ordered     IP VTE LOW RISK PATIENT  Once         08/03/23 0108     Place sequential compression device  Until discontinued         08/03/23 0108                    Discharge Planning   DONIS: 8/6/2023     Code Status: Full Code   Is the patient medically ready for discharge?: No    Reason for patient still in hospital (select all that apply): Patient trending condition and Treatment  Discharge Plan A: Home with family                  Juan Hunt MS3  Department of Hospital Medicine   [facility] - [facility]

## 2023-08-04 NOTE — SEDATION DOCUMENTATION
12FR abscess drain placed to RUQ. 450mL of fluid removed from abscess upon insertion of drain. Pt tolerated well with no symptoms of distress noted. Will be transported to [facility] for 1 hour recovery before returning to unit.

## 2023-08-04 NOTE — ASSESSMENT & PLAN NOTE
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 Reports chronic infection and completed treatment. Reactive antibody will always be present 2/2 to previous infection.     - follow up RNA viral load

## 2023-08-04 NOTE — ASSESSMENT & PLAN NOTE
Mr. Page is a 53 YOM with cirrhosis 2/2 to EtOH use and HCV, esophageal varices, GIB, HTN, hypothyroidism, MDD, hydrocephalous, cholelithiasis, who presented to OSH for abdominal pain and is coming to [facility] for IR evaluation of intra-thoracic hematoma and hepatology evaluation. Presented for abdominal pain which has now resolved. CT A/P with IV contrast done and shows upper abdominal fluid collection likely a hematoma, surrounds the liver, stomach, and spleen. Origin is not identified however may represent a variceal or hepatic hematoma. No active extravasation identified. Patient transferred for IR and hepatology evaluation. History of past varices ligation. Has 1.1 cm hypoattenuating lesion in left hepatic lobe.     CT triple phase: loculated ascites; no mass lesions of the liver identified, though arterial phase not assessed    PLAN:  IR consulted, appreciate recs   - IR drainage   - follow up cultures and cytology   - ID consulted, appreciate recs

## 2023-08-04 NOTE — TREATMENT PLAN
Hepatology Treatment Plan    Gregg Page is a 53 y.o. male admitted to hospital 8/3/2023 (Hospital Day: 2) due to Abdominal hematoma.     Interval History  No adverse events overnight. Remains with mild abdominal discomfort and persistent hiccups despite baclofen. 1 episode of sweats last evening, though denies any subjective fevers. Appetite remains, passing flatus and having bowel movements.     Objective  Temp:  [97.6 °F (36.4 °C)-98.4 °F (36.9 °C)] 97.8 °F (36.6 °C) (08/04 0752)  Pulse:  [72-84] 75 (08/04 0752)  BP: (112-137)/(57-72) 114/57 (08/04 0752)  Resp:  [17-18] 18 (08/04 0752)  SpO2:  [92 %-94 %] 93 % (08/04 0752)    General: Alert, Oriented x3, no distress  Neurologic: Asterixis absent  Abdomen: Normoactive bowel sounds. Non-distended. Normal tympany. Soft. Mildly Tender to palpation in RUQ . No peritoneal signs.    Laboratory    Lab Results   Component Value Date    WBC 16.28 (H) 08/04/2023    HGB 12.0 (L) 08/04/2023    HCT 36.3 (L) 08/04/2023    MCV 88 08/04/2023     (L) 08/04/2023       Lab Results   Component Value Date     08/04/2023    K 3.2 (L) 08/04/2023     08/04/2023    CO2 26 08/04/2023    BUN 10 08/04/2023    CREATININE 0.6 08/04/2023    CALCIUM 7.9 (L) 08/04/2023       Lab Results   Component Value Date    ALBUMIN 2.2 (L) 08/04/2023    ALT 17 08/04/2023    AST 27 08/04/2023    ALKPHOS 117 08/04/2023    BILITOT 1.6 (H) 08/04/2023       Lab Results   Component Value Date    INR 1.2 08/03/2023       MELD 3.0: 13 at 8/4/2023  3:34 AM  MELD-Na: 10 at 8/4/2023  3:34 AM  Calculated from:  Serum Creatinine: 0.6 mg/dL (Using min of 1 mg/dL) at 8/4/2023  3:34 AM  Serum Sodium: 136 mmol/L at 8/4/2023  3:34 AM  Total Bilirubin: 1.6 mg/dL at 8/4/2023  3:34 AM  Serum Albumin: 2.2 g/dL at 8/4/2023  3:34 AM  INR(ratio): 1.2 at 8/3/2023  2:56 AM  Age at listing (hypothetical): 53 years  Sex: Male at 8/4/2023  3:34 AM      Assessment  53M PMH EtOH cirrhosis c/b esophageal varices s/p  multiple banding procedures, HCV who presented to [facility] ED for right sided abdominal pain. Transferred to [facility] for hepatology and IR evaluation. RUQ US on 8/2 notable for cirrhotic liver with interval development (since 7/26/23) of multiloculated hypoechoic echogenicity around L hepatic lobe + cholelithiasis w/o evidence of cholecystitis.Initial concern for upper abdominal fluid collection from outside hospital. CT done here notable for possible loculated ascites collection, cholelithiasis without cholecystitis, and sequela of cirrhosis. Differentials for fluid collection include loculated ascitic fluid collection, hematoma (infected vs. Sterile), abscess.     Cirrhosis  Hepatitis C Virus  Abdominal fluid collection    Plan  - f/u IR cytology, culture. Consider starting antibiotics   - please obtain daily CBC, BMP, LFT, INR  - F/u HCV RNA     Thank you for involving us in the care of Gregg Page. Please call with any additional concerns or questions.    Anthony Zamora MD  Hepatology Service  [facility]

## 2023-08-04 NOTE — ASSESSMENT & PLAN NOTE
Patient with tongue swelling that started day of admission. White and green lesions on exam, looks dry. Dysphagia with dry tongue otherwise able to swallow. No hoarseness    - SLP consulted, recs appreciated   - Nystatin and Magic Mouthwash PRN

## 2023-08-04 NOTE — NURSING
Procedure recovery complete. VSS. Patient denies pain. Procedure site dressing is clean, dry, and intact. CATRACHITA bulb compressed. Report called to Jeffery MARSH.

## 2023-08-04 NOTE — SUBJECTIVE & OBJECTIVE
Interval History:   S/p intraabdominal drainage of large intraabdominal fluid collection with IR with drain placement. Hiccups and abdominal pain have improved since procedure.WBC of 8,030, suspicious for abscess.     Current Facility-Administered Medications   Medication    (Magic mouthwash) 1:1:1 diphenhydrAMINE(Benadryl) 12.5mg/5ml liq, aluminum & magnesium hydroxide-simethicone (Maalox), LIDOcaine viscous 2%    [START ON 8/5/2023] acetaminophen tablet 650 mg    baclofen tablet 5 mg    busPIRone split tablet 7.5 mg    dextrose 10% bolus 125 mL 125 mL    dextrose 10% bolus 250 mL 250 mL    glucagon (human recombinant) injection 1 mg    glucose chewable tablet 16 g    glucose chewable tablet 24 g    levothyroxine tablet 75 mcg    metoclopramide HCl tablet 10 mg    naloxone 0.4 mg/mL injection 0.02 mg    nystatin 100,000 unit/mL suspension 500,000 Units    pantoprazole EC tablet 40 mg    potassium bicarbonate disintegrating tablet 20 mEq    propranoloL tablet 10 mg    sertraline tablet 25 mg    sodium chloride 0.9% flush 10 mL       Objective:     Vital Signs (Most Recent):  Temp: 97.8 °F (36.6 °C) (08/04/23 0752)  Pulse: 75 (08/04/23 0752)  Resp: 18 (08/04/23 0752)  BP: (!) 114/57 (08/04/23 0752)  SpO2: (!) 93 % (08/04/23 0752) Vital Signs (24h Range):  Temp:  [97.6 °F (36.4 °C)-98.4 °F (36.9 °C)] 97.8 °F (36.6 °C)  Pulse:  [72-86] 75  Resp:  [16-18] 18  SpO2:  [92 %-94 %] 93 %  BP: (112-137)/(57-72) 114/57     Weight: 92.3 kg (203 lb 7.8 oz) (08/03/23 0045)  Body mass index is 28.38 kg/m².       Physical Exam  Vitals and nursing note reviewed.   Constitutional:       General: He is not in acute distress.     Appearance: He is not toxic-appearing.   HENT:      Head: Normocephalic and atraumatic.   Eyes:      General: No scleral icterus.     Extraocular Movements: Extraocular movements intact.      Pupils: Pupils are equal, round, and reactive to light.   Cardiovascular:      Rate and Rhythm: Normal rate and regular  rhythm.      Pulses: Normal pulses.      Heart sounds: Normal heart sounds.   Pulmonary:      Effort: Pulmonary effort is normal.      Breath sounds: Normal breath sounds.   Abdominal:      General: Abdomen is flat. There is no distension.      Palpations: Abdomen is soft. There is no mass.      Tenderness: There is no guarding.      Comments: Drain in place with serous fluid present; no surrounding erythema at insertion site.   Musculoskeletal:         General: No swelling or tenderness. Normal range of motion.      Cervical back: Normal range of motion.   Skin:     General: Skin is warm and dry.      Capillary Refill: Capillary refill takes less than 2 seconds.      Comments: Diffuse erythematous, polygonal plaques    Neurological:      General: No focal deficit present.      Mental Status: He is alert and oriented to person, place, and time. Mental status is at baseline.      Cranial Nerves: No cranial nerve deficit.   Psychiatric:         Mood and Affect: Mood normal.         Behavior: Behavior normal.            MELD 3.0: 13 at 8/4/2023  3:34 AM  MELD-Na: 10 at 8/4/2023  3:34 AM  Calculated from:  Serum Creatinine: 0.6 mg/dL (Using min of 1 mg/dL) at 8/4/2023  3:34 AM  Serum Sodium: 136 mmol/L at 8/4/2023  3:34 AM  Total Bilirubin: 1.6 mg/dL at 8/4/2023  3:34 AM  Serum Albumin: 2.2 g/dL at 8/4/2023  3:34 AM  INR(ratio): 1.2 at 8/3/2023  2:56 AM  Age at listing (hypothetical): 53 years  Sex: Male at 8/4/2023  3:34 AM      Significant Labs:  Labs within the past month have been reviewed.    Significant Imaging:  CT: Reviewed.

## 2023-08-04 NOTE — NURSING
Called Dr to inform patient complaining for a lot of pain, patient is requesting Dilaudid 3 mg Q 3 hrs or 4 mg Q 4 hrs, MD aware and will make changes if necessary.

## 2023-08-04 NOTE — PLAN OF CARE
Problem: Adult Inpatient Plan of Care  Goal: Plan of Care Review  Outcome: Ongoing, Progressing  Goal: Patient-Specific Goal (Individualized)  Outcome: Ongoing, Progressing  Goal: Absence of Hospital-Acquired Illness or Injury  Outcome: Ongoing, Progressing  Goal: Optimal Comfort and Wellbeing  Outcome: Ongoing, Progressing  Goal: Readiness for Transition of Care  Outcome: Ongoing, Progressing     Patient was NPO for the majority of the day due to the IR procedure today. The majority of the morning he slept, was woken up for meds and tolerated them well. Did have the hiccups, these were visualized by this nurse when going into the room to check on the patient. Patient went and came back from surgery mid afternoon, was drowsy and also had a drain placed to his RUQ, draining well. Patient was still drowsy and was in and out of sleeping between care being provided. Wife and sister were both in the room throughout the day to help support patient. Plan of care continues.

## 2023-08-04 NOTE — SUBJECTIVE & OBJECTIVE
Interval History: Hiccups overnight relieved with Reglan. Notes improvement of tongue symptoms with mouth rinse.    Review of Systems   Constitutional:  Negative for diaphoresis and fever.   HENT:  Positive for mouth sores.    Respiratory:  Negative for wheezing and stridor.         +hiccups   Cardiovascular:  Negative for chest pain and leg swelling.   Gastrointestinal:  Positive for abdominal pain. Negative for nausea and vomiting.   Genitourinary:  Negative for dysuria.   Musculoskeletal:  Negative for gait problem.   Skin:  Positive for rash.   Psychiatric/Behavioral:  Positive for hallucinations.      Objective:     Vital Signs (Most Recent):  Temp: 96.4 °F (35.8 °C) (08/04/23 1110)  Pulse: 71 (08/04/23 1400)  Resp: 20 (08/04/23 1400)  BP: 124/69 (08/04/23 1400)  SpO2: 95 % (08/04/23 1400) Vital Signs (24h Range):  Temp:  [96.4 °F (35.8 °C)-98.4 °F (36.9 °C)] 96.4 °F (35.8 °C)  Pulse:  [69-84] 71  Resp:  [16-20] 20  SpO2:  [93 %-95 %] 95 %  BP: (112-137)/(55-75) 124/69     Weight: 92.3 kg (203 lb 7.8 oz)  Body mass index is 28.38 kg/m².    Intake/Output Summary (Last 24 hours) at 8/4/2023 1404  Last data filed at 8/3/2023 1800  Gross per 24 hour   Intake 240 ml   Output --   Net 240 ml         Physical Exam  Vitals and nursing note reviewed.   Constitutional:       Appearance: Normal appearance.   HENT:      Head: Normocephalic and atraumatic.   Eyes:      Extraocular Movements: Extraocular movements intact.      Pupils: Pupils are equal, round, and reactive to light.   Cardiovascular:      Rate and Rhythm: Normal rate and regular rhythm.      Heart sounds: No murmur heard.  Pulmonary:      Effort: No respiratory distress.      Breath sounds: No stridor. No wheezing.   Abdominal:      General: There is distension.      Tenderness: There is abdominal tenderness. There is no guarding or rebound.   Musculoskeletal:         General: No deformity or signs of injury.      Cervical back: Normal range of motion and  neck supple.      Right lower leg: No edema.      Left lower leg: No edema.   Skin:     General: Skin is warm and dry.      Findings: Rash present.   Neurological:      General: No focal deficit present.      Mental Status: He is alert.      Gait: Gait normal.             Significant Labs: All pertinent labs within the past 24 hours have been reviewed.    Significant Imaging: I have reviewed all pertinent imaging results/findings within the past 24 hours.

## 2023-08-04 NOTE — PROGRESS NOTES
[facility]  Progress Note    Patient Name: Gregg Page  MRN: [id number]  Patient Class: IP- Inpatient   Admission Date: 8/3/2023  Length of Stay: 1 days  Attending Physician: Rosalind Borden MD  Primary Care Provider: Katya, Primary Doctor        Subjective:     Principal Problem:Intra-abdominal abscess        HPI:  Mr. Page is a 53 YOM with cirrhosis 2/2 to EtOH use and HCV, esophageal varices, GIB, HTN, hypothyroidism, MDD, hydrocephalous, cholelithiasis, who presented to OSH for abdominal pain and is coming to [facility] for IR evaluation of intra-thoracic hematoma and hepatology evaluation. Of note, patient was hospitalized 6/23 with similar presentation had MRCP done at that time (unavailable for review).  Abdominal pain radiates to the chest, onset 6 days prior to presentation at OSH. Patient denies melena, BRBPR, continued abdominal pain or CP. Patient states morning of transfer he started having tongue swelling and pain, feels dry, remitted with water, has dysphagia when his tongue is dry only, pain described as burning and located throughout tongue. No recent procedures/liver biopsy done. Patient also states for the past 1 week has had intractable hiccups which cause anxiety.     Patient hemodynamically stable. Labs concerning for new leukocytosis 17.9, normocytic anemia 12.6 ammonia 50, low Hct 32.2, elevated PT 16.6, INR 1.31, Troponin, lipase, U/A, BNP, CXR wnl    CT A/P with IV contrast done and shows upper abdominal fluid collection likely a hematoma, surrounds the liver, stomach, and spleen. Origin is not identified however may represent a variceal or hepatic hematoma. No active extravasation identified. Collection measures greater than 21.7 cm in transverse diameter and largest single pocket measures approx 7.5cm in maximal AP dimension.      Overview/Hospital Course:  Per IR, ordered CT a/p triple phase. Procal 0.59; blood cultures ordered. CXR: Left lower lobe patchy opacity could  represent atelectasis versus infiltrate      Interval History: Hiccups overnight relieved with Reglan. Notes improvement of tongue symptoms with mouth rinse.    Review of Systems   Constitutional:  Negative for diaphoresis and fever.   HENT:  Positive for mouth sores.    Respiratory:  Negative for wheezing and stridor.         +hiccups   Cardiovascular:  Negative for chest pain and leg swelling.   Gastrointestinal:  Positive for abdominal pain. Negative for nausea and vomiting.   Genitourinary:  Negative for dysuria.   Musculoskeletal:  Negative for gait problem.   Skin:  Positive for rash.   Psychiatric/Behavioral:  Positive for hallucinations.      Objective:     Vital Signs (Most Recent):  Temp: 96.4 °F (35.8 °C) (08/04/23 1110)  Pulse: 71 (08/04/23 1400)  Resp: 20 (08/04/23 1400)  BP: 124/69 (08/04/23 1400)  SpO2: 95 % (08/04/23 1400) Vital Signs (24h Range):  Temp:  [96.4 °F (35.8 °C)-98.4 °F (36.9 °C)] 96.4 °F (35.8 °C)  Pulse:  [69-84] 71  Resp:  [16-20] 20  SpO2:  [93 %-95 %] 95 %  BP: (112-137)/(55-75) 124/69     Weight: 92.3 kg (203 lb 7.8 oz)  Body mass index is 28.38 kg/m².    Intake/Output Summary (Last 24 hours) at 8/4/2023 1404  Last data filed at 8/3/2023 1800  Gross per 24 hour   Intake 240 ml   Output --   Net 240 ml         Physical Exam  Vitals and nursing note reviewed.   Constitutional:       Appearance: Normal appearance.   HENT:      Head: Normocephalic and atraumatic.   Eyes:      Extraocular Movements: Extraocular movements intact.      Pupils: Pupils are equal, round, and reactive to light.   Cardiovascular:      Rate and Rhythm: Normal rate and regular rhythm.      Heart sounds: No murmur heard.  Pulmonary:      Effort: No respiratory distress.      Breath sounds: No stridor. No wheezing.   Abdominal:      General: There is distension.      Tenderness: There is abdominal tenderness. There is no guarding or rebound.   Musculoskeletal:         General: No deformity or signs of injury.       Cervical back: Normal range of motion and neck supple.      Right lower leg: No edema.      Left lower leg: No edema.   Skin:     General: Skin is warm and dry.      Findings: Rash present.   Neurological:      General: No focal deficit present.      Mental Status: He is alert.      Gait: Gait normal.             Significant Labs: All pertinent labs within the past 24 hours have been reviewed.    Significant Imaging: I have reviewed all pertinent imaging results/findings within the past 24 hours.      Assessment/Plan:      * Intra-abdominal abscess  Mr. Page is a 53 YOM with cirrhosis 2/2 to EtOH use and HCV, esophageal varices, GIB, HTN, hypothyroidism, MDD, hydrocephalous, cholelithiasis, who presented to OSH for abdominal pain and is coming to [facility] for IR evaluation of intra-thoracic hematoma and hepatology evaluation. Presented for abdominal pain which has now resolved. CT A/P with IV contrast done and shows upper abdominal fluid collection likely a hematoma, surrounds the liver, stomach, and spleen. Origin is not identified however may represent a variceal or hepatic hematoma. No active extravasation identified. Patient transferred for IR and hepatology evaluation. History of past varices ligation. Has 1.1 cm hypoattenuating lesion in left hepatic lobe.     CT triple phase: loculated ascites; no mass lesions of the liver identified, though arterial phase not assessed    PLAN:  IR consulted, appreciate recs   - IR drainage   - follow up cultures and cytology   - ID consulted, appreciate recs    Cirrhosis    MELD 3.0: 13 at 8/4/2023  3:34 AM  MELD-Na: 10 at 8/4/2023  3:34 AM  Calculated from:  Serum Creatinine: 0.6 mg/dL (Using min of 1 mg/dL) at 8/4/2023  3:34 AM  Serum Sodium: 136 mmol/L at 8/4/2023  3:34 AM  Total Bilirubin: 1.6 mg/dL at 8/4/2023  3:34 AM  Serum Albumin: 2.2 g/dL at 8/4/2023  3:34 AM  INR(ratio): 1.2 at 8/3/2023  2:56 AM  Age at listing (hypothetical): 53 years  Sex: Male at 8/4/2023  3:34  AM    - hepatology consulted, recs appreciated   - patient on nadolol at OSH, started on propranolol this admission  - continue pantoprazole 40 mg from OSH       Altered mental state  Wife reports hallucinations and change in behavior since onset. No known prior episodes. DDx includes hepatic encephalopathy versus delirium    PLAN:  - follow up ammonia level  - delirium precautions      Normocytic anemia  Patient presents with anemia 12.1 mcv 87 (normocytic anemia 12.6 at OSH). No known baseline hemoglobin. Patient with new hematoma formation.     Iron panel: iron 26, TIBC 250, transferrin 169, Ferritin 213.       Hiccups  Patient with new hiccups for one week while at OSH. possibly 2/2 from loculated ascites causing irritation to phrenic nerve     Improved temporarily with reglan overnight.    PLAN:  - baclofen 5 TID prn   - reassess post drainage    Tongue lesion  Patient with tongue swelling that started day of admission. White and green lesions on exam, looks dry. Dysphagia with dry tongue otherwise able to swallow. No hoarseness    - SLP consulted, recs appreciated   - Nystatin and Magic Mouthwash PRN    Leukocytosis  Patient with leukocytosis that was present at OSH at 17.9 and downtrending to 15.24. patient otherwise is hemodynamically stable. Low concern for sepsis at this time however high chance for decompensation given presence of hematoma    Unclear etiology consider infected hematoma vs stress    - continue to monitor     HTN (hypertension)  Patient normotensive with hematoma present. Given risk for possible hemorrhagic shock will hold home BP meds     - hold home amlodipine 5   - hold home HCTZ 25     Gallstones  Cholelithiasis without cystitis   Seen on imaging 8/2/23    - monitor ALP and bili    HCV (hepatitis C virus)  Per OSH records patient with history HCV    - HCV lab pending      MDD (major depressive disorder)  - continue home sertraline 50 mg   - continue buspirone 7.5 mg  BID    Hypothyroidism  - continue home synthroid 75 mcg      VTE Risk Mitigation (From admission, onward)         Ordered     IP VTE LOW RISK PATIENT  Once         08/03/23 0108     Place sequential compression device  Until discontinued         08/03/23 0108                Discharge Planning   DONIS: 8/6/2023     Code Status: Full Code   Is the patient medically ready for discharge?: No    Reason for patient still in hospital (select all that apply): Treatment and Consult recommendations  Discharge Plan A: Home with family                  Ailyn Duong DO  Department of Hospital Medicine   [facility] - Surgery

## 2023-08-04 NOTE — ASSESSMENT & PLAN NOTE
Patient with new hiccups for one week while at OSH. possibly 2/2 from loculated ascites causing irritation to phrenic nerve     Improved temporarily with reglan overnight.    PLAN:  - reglan prn

## 2023-08-05 LAB
ALBUMIN SERPL BCP-MCNC: 2.2 G/DL (ref 3.5–5.2)
ALP SERPL-CCNC: 127 U/L (ref 55–135)
ALT SERPL W/O P-5'-P-CCNC: 15 U/L (ref 10–44)
ANION GAP SERPL CALC-SCNC: 12 MMOL/L (ref 8–16)
AST SERPL-CCNC: 24 U/L (ref 10–40)
BASOPHILS # BLD AUTO: 0.06 K/UL (ref 0–0.2)
BASOPHILS NFR BLD: 0.4 % (ref 0–1.9)
BILIRUB SERPL-MCNC: 1.1 MG/DL (ref 0.1–1)
BUN SERPL-MCNC: 8 MG/DL (ref 6–20)
CALCIUM SERPL-MCNC: 7.8 MG/DL (ref 8.7–10.5)
CHLORIDE SERPL-SCNC: 102 MMOL/L (ref 95–110)
CO2 SERPL-SCNC: 24 MMOL/L (ref 23–29)
CREAT SERPL-MCNC: 0.6 MG/DL (ref 0.5–1.4)
DIFFERENTIAL METHOD: ABNORMAL
EOSINOPHIL # BLD AUTO: 0.3 K/UL (ref 0–0.5)
EOSINOPHIL NFR BLD: 2.2 % (ref 0–8)
ERYTHROCYTE [DISTWIDTH] IN BLOOD BY AUTOMATED COUNT: 15.5 % (ref 11.5–14.5)
EST. GFR  (NO RACE VARIABLE): >60 ML/MIN/1.73 M^2
GLUCOSE SERPL-MCNC: 94 MG/DL (ref 70–110)
HCT VFR BLD AUTO: 36.7 % (ref 40–54)
HGB BLD-MCNC: 12.2 G/DL (ref 14–18)
IMM GRANULOCYTES # BLD AUTO: 0.4 K/UL (ref 0–0.04)
IMM GRANULOCYTES NFR BLD AUTO: 2.6 % (ref 0–0.5)
INR PPP: 1.2 (ref 0.8–1.2)
LYMPHOCYTES # BLD AUTO: 1.5 K/UL (ref 1–4.8)
LYMPHOCYTES NFR BLD: 9.5 % (ref 18–48)
MAGNESIUM SERPL-MCNC: 1.9 MG/DL (ref 1.6–2.6)
MCH RBC QN AUTO: 29.3 PG (ref 27–31)
MCHC RBC AUTO-ENTMCNC: 33.2 G/DL (ref 32–36)
MCV RBC AUTO: 88 FL (ref 82–98)
MONOCYTES # BLD AUTO: 1.9 K/UL (ref 0.3–1)
MONOCYTES NFR BLD: 12.3 % (ref 4–15)
NEUTROPHILS # BLD AUTO: 11.5 K/UL (ref 1.8–7.7)
NEUTROPHILS NFR BLD: 73 % (ref 38–73)
NRBC BLD-RTO: 0 /100 WBC
PHOSPHATE SERPL-MCNC: 2.8 MG/DL (ref 2.7–4.5)
PLATELET # BLD AUTO: 104 K/UL (ref 150–450)
PMV BLD AUTO: 10.9 FL (ref 9.2–12.9)
POTASSIUM SERPL-SCNC: 3 MMOL/L (ref 3.5–5.1)
PROT SERPL-MCNC: 5.4 G/DL (ref 6–8.4)
PROTHROMBIN TIME: 12.8 SEC (ref 9–12.5)
RBC # BLD AUTO: 4.17 M/UL (ref 4.6–6.2)
SODIUM SERPL-SCNC: 138 MMOL/L (ref 136–145)
WBC # BLD AUTO: 15.66 K/UL (ref 3.9–12.7)

## 2023-08-05 PROCEDURE — 63600175 PHARM REV CODE 636 W HCPCS: Performed by: STUDENT IN AN ORGANIZED HEALTH CARE EDUCATION/TRAINING PROGRAM

## 2023-08-05 PROCEDURE — 99232 PR SUBSEQUENT HOSPITAL CARE,LEVL II: ICD-10-PCS | Mod: ,,, | Performed by: INTERNAL MEDICINE

## 2023-08-05 PROCEDURE — 25000003 PHARM REV CODE 250: Performed by: STUDENT IN AN ORGANIZED HEALTH CARE EDUCATION/TRAINING PROGRAM

## 2023-08-05 PROCEDURE — 99233 SBSQ HOSP IP/OBS HIGH 50: CPT | Mod: ,,, | Performed by: HOSPITALIST

## 2023-08-05 PROCEDURE — 99232 SBSQ HOSP IP/OBS MODERATE 35: CPT | Mod: ,,, | Performed by: INTERNAL MEDICINE

## 2023-08-05 PROCEDURE — 83735 ASSAY OF MAGNESIUM: CPT

## 2023-08-05 PROCEDURE — 84100 ASSAY OF PHOSPHORUS: CPT

## 2023-08-05 PROCEDURE — 85610 PROTHROMBIN TIME: CPT | Performed by: STUDENT IN AN ORGANIZED HEALTH CARE EDUCATION/TRAINING PROGRAM

## 2023-08-05 PROCEDURE — 99233 PR SUBSEQUENT HOSPITAL CARE,LEVL III: ICD-10-PCS | Mod: ,,, | Performed by: HOSPITALIST

## 2023-08-05 PROCEDURE — 36415 COLL VENOUS BLD VENIPUNCTURE: CPT | Performed by: STUDENT IN AN ORGANIZED HEALTH CARE EDUCATION/TRAINING PROGRAM

## 2023-08-05 PROCEDURE — 21400001 HC TELEMETRY ROOM

## 2023-08-05 PROCEDURE — 99223 1ST HOSP IP/OBS HIGH 75: CPT | Mod: ,,, | Performed by: INTERNAL MEDICINE

## 2023-08-05 PROCEDURE — 11000001 HC ACUTE MED/SURG PRIVATE ROOM

## 2023-08-05 PROCEDURE — 99223 PR INITIAL HOSPITAL CARE,LEVL III: ICD-10-PCS | Mod: ,,, | Performed by: INTERNAL MEDICINE

## 2023-08-05 PROCEDURE — 25000003 PHARM REV CODE 250

## 2023-08-05 PROCEDURE — 85025 COMPLETE CBC W/AUTO DIFF WBC: CPT

## 2023-08-05 PROCEDURE — 80053 COMPREHEN METABOLIC PANEL: CPT

## 2023-08-05 RX ORDER — CHLORPROMAZINE HYDROCHLORIDE 25 MG/ML
25 INJECTION INTRAMUSCULAR 3 TIMES DAILY PRN
Status: DISCONTINUED | OUTPATIENT
Start: 2023-08-05 | End: 2023-08-05

## 2023-08-05 RX ORDER — LACTULOSE 10 G/15ML
15 SOLUTION ORAL 3 TIMES DAILY
Status: DISCONTINUED | OUTPATIENT
Start: 2023-08-05 | End: 2023-08-07

## 2023-08-05 RX ORDER — POTASSIUM CHLORIDE 7.45 MG/ML
10 INJECTION INTRAVENOUS
Status: COMPLETED | OUTPATIENT
Start: 2023-08-05 | End: 2023-08-05

## 2023-08-05 RX ORDER — CHLORPROMAZINE HYDROCHLORIDE 25 MG/ML
25 INJECTION INTRAMUSCULAR 4 TIMES DAILY PRN
Status: DISCONTINUED | OUTPATIENT
Start: 2023-08-05 | End: 2023-08-17 | Stop reason: HOSPADM

## 2023-08-05 RX ORDER — METOCLOPRAMIDE HYDROCHLORIDE 5 MG/ML
5 INJECTION INTRAMUSCULAR; INTRAVENOUS ONCE
Status: COMPLETED | OUTPATIENT
Start: 2023-08-05 | End: 2023-08-05

## 2023-08-05 RX ADMIN — PANTOPRAZOLE SODIUM 40 MG: 40 TABLET, DELAYED RELEASE ORAL at 08:08

## 2023-08-05 RX ADMIN — PROPRANOLOL HYDROCHLORIDE 10 MG: 10 TABLET ORAL at 08:08

## 2023-08-05 RX ADMIN — POTASSIUM CHLORIDE 10 MEQ: 7.46 INJECTION, SOLUTION INTRAVENOUS at 10:08

## 2023-08-05 RX ADMIN — LEVOTHYROXINE SODIUM 75 MCG: 75 TABLET ORAL at 05:08

## 2023-08-05 RX ADMIN — POTASSIUM BICARBONATE 40 MEQ: 391 TABLET, EFFERVESCENT ORAL at 06:08

## 2023-08-05 RX ADMIN — POTASSIUM CHLORIDE 10 MEQ: 7.46 INJECTION, SOLUTION INTRAVENOUS at 11:08

## 2023-08-05 RX ADMIN — METOCLOPRAMIDE 5 MG: 5 INJECTION, SOLUTION INTRAMUSCULAR; INTRAVENOUS at 06:08

## 2023-08-05 RX ADMIN — NYSTATIN 500000 UNITS: 100000 SUSPENSION ORAL at 08:08

## 2023-08-05 RX ADMIN — METRONIDAZOLE 500 MG: 500 INJECTION, SOLUTION INTRAVENOUS at 08:08

## 2023-08-05 RX ADMIN — POTASSIUM CHLORIDE 10 MEQ: 7.46 INJECTION, SOLUTION INTRAVENOUS at 08:08

## 2023-08-05 RX ADMIN — SERTRALINE HYDROCHLORIDE 25 MG: 25 TABLET ORAL at 08:08

## 2023-08-05 RX ADMIN — POTASSIUM CHLORIDE 10 MEQ: 7.46 INJECTION, SOLUTION INTRAVENOUS at 06:08

## 2023-08-05 RX ADMIN — METRONIDAZOLE 500 MG: 500 INJECTION, SOLUTION INTRAVENOUS at 03:08

## 2023-08-05 RX ADMIN — CHLORPROMAZINE HYDROCHLORIDE 25 MG: 25 INJECTION INTRAMUSCULAR at 08:08

## 2023-08-05 RX ADMIN — NYSTATIN 500000 UNITS: 100000 SUSPENSION ORAL at 01:08

## 2023-08-05 RX ADMIN — BUSPIRONE HYDROCHLORIDE 7.5 MG: 5 TABLET ORAL at 08:08

## 2023-08-05 RX ADMIN — CEFTRIAXONE 2 G: 2 INJECTION, POWDER, FOR SOLUTION INTRAMUSCULAR; INTRAVENOUS at 12:08

## 2023-08-05 RX ADMIN — METRONIDAZOLE 500 MG: 500 INJECTION, SOLUTION INTRAVENOUS at 01:08

## 2023-08-05 RX ADMIN — POTASSIUM CHLORIDE 10 MEQ: 7.46 INJECTION, SOLUTION INTRAVENOUS at 09:08

## 2023-08-05 RX ADMIN — NYSTATIN 500000 UNITS: 100000 SUSPENSION ORAL at 05:08

## 2023-08-05 RX ADMIN — CHLORPROMAZINE HYDROCHLORIDE 25 MG: 25 INJECTION INTRAMUSCULAR at 03:08

## 2023-08-05 RX ADMIN — POTASSIUM CHLORIDE 10 MEQ: 7.46 INJECTION, SOLUTION INTRAVENOUS at 12:08

## 2023-08-05 RX ADMIN — LACTULOSE 15 G: 20 SOLUTION ORAL at 03:08

## 2023-08-05 RX ADMIN — NYSTATIN 500000 UNITS: 100000 SUSPENSION ORAL at 09:08

## 2023-08-05 RX ADMIN — LACTULOSE 15 G: 20 SOLUTION ORAL at 08:08

## 2023-08-05 NOTE — ASSESSMENT & PLAN NOTE
Wife reports hallucinations and change in behavior since onset, pt states this occurred once 3 days ago while falling asleep. No known prior episodes. DDx includes hepatic encephalopathy versus delirium    Ammonia 67    PLAN:  - delirium precautions

## 2023-08-05 NOTE — PROGRESS NOTES
[facility] Medicine  Progress Note    Patient Name: Gregg Page  MRN: [id number]  Patient Class: IP- Inpatient   Admission Date: 8/3/2023  Length of Stay: 2 days  Attending Physician: Rosalind Borden MD  Primary Care Provider: Katya, Primary Doctor        Subjective:     Principal Problem:Intra-abdominal abscess        HPI:  Mr. Page is a 53 YOM with cirrhosis 2/2 to EtOH use and HCV, esophageal varices, GIB, HTN, hypothyroidism, MDD, hydrocephalous, cholelithiasis, who presented to OSH for abdominal pain and is coming to [facility] for IR evaluation of intra-thoracic hematoma and hepatology evaluation. Of note, patient was hospitalized 6/23 with similar presentation had MRCP done at that time (unavailable for review).  Abdominal pain radiates to the chest, onset 6 days prior to presentation at OSH. Patient denies melena, BRBPR, continued abdominal pain or CP. Patient states morning of transfer he started having tongue swelling and pain, feels dry, remitted with water, has dysphagia when his tongue is dry only, pain described as burning and located throughout tongue. No recent procedures/liver biopsy done. Patient also states for the past 1 week has had intractable hiccups which cause anxiety.     Patient hemodynamically stable. Labs concerning for new leukocytosis 17.9, normocytic anemia 12.6 ammonia 50, low Hct 32.2, elevated PT 16.6, INR 1.31, Troponin, lipase, U/A, BNP, CXR wnl    CT A/P with IV contrast done and shows upper abdominal fluid collection likely a hematoma, surrounds the liver, stomach, and spleen. Origin is not identified however may represent a variceal or hepatic hematoma. No active extravasation identified. Collection measures greater than 21.7 cm in transverse diameter and largest single pocket measures approx 7.5cm in maximal AP dimension.      Overview/Hospital Course:  Procal 0.59; blood cultures show NGTD. CT triple phase shows loculated ascites and no visualized liver lesion. IR  drainage of presumed intra-abdominal abscess 8/4/2023; pending cxs. Cytology reveals elevated WBC at 8030; aerobic cx negative, gram stain negative.      Interval History: NAEO. Reports initial improvement of hiccups post drainage, however, have since returned. He does not think the Reglan improves his symptoms. His episode of hallucinations occurred once 3 days ago while falling asleep.    Review of Systems   Constitutional:  Negative for chills, diaphoresis and fever.   Eyes:  Negative for visual disturbance.   Respiratory:  Positive for cough (hiccups). Negative for shortness of breath.    Cardiovascular:  Negative for chest pain and leg swelling.   Gastrointestinal:  Positive for abdominal pain. Negative for nausea and vomiting.   Genitourinary:  Negative for dysuria.   Musculoskeletal:  Negative for gait problem.   Skin:  Positive for rash.   Neurological:  Negative for headaches.   Psychiatric/Behavioral:  Positive for hallucinations.      Objective:     Vital Signs (Most Recent):  Temp: 98.1 °F (36.7 °C) (08/05/23 0839)  Pulse: 71 (08/05/23 0839)  Resp: 18 (08/05/23 0839)  BP: 131/73 (08/05/23 0839)  SpO2: 98 % (08/05/23 0839) Vital Signs (24h Range):  Temp:  [96.4 °F (35.8 °C)-98.6 °F (37 °C)] 98.1 °F (36.7 °C)  Pulse:  [69-78] 71  Resp:  [15-21] 18  SpO2:  [91 %-98 %] 98 %  BP: (102-134)/(51-75) 131/73     Weight: 92.3 kg (203 lb 7.8 oz)  Body mass index is 28.38 kg/m².    Intake/Output Summary (Last 24 hours) at 8/5/2023 0842  Last data filed at 8/5/2023 0531  Gross per 24 hour   Intake --   Output 690 ml   Net -690 ml         Physical Exam  Vitals and nursing note reviewed.   Constitutional:       General: He is not in acute distress.     Appearance: Normal appearance.   HENT:      Head: Normocephalic and atraumatic.   Eyes:      Extraocular Movements: Extraocular movements intact.      Pupils: Pupils are equal, round, and reactive to light.   Cardiovascular:      Rate and Rhythm: Normal rate and regular  rhythm.   Pulmonary:      Effort: Pulmonary effort is normal. No respiratory distress.      Breath sounds: Normal breath sounds. No wheezing.   Abdominal:      General: There is distension.      Tenderness: There is no abdominal tenderness. There is no guarding.   Musculoskeletal:      Cervical back: Normal range of motion and neck supple.   Skin:     General: Skin is warm and dry.      Findings: Bruising and rash present.   Neurological:      General: No focal deficit present.      Mental Status: He is alert and oriented to person, place, and time.   Psychiatric:         Mood and Affect: Mood normal.         Behavior: Behavior normal.             Significant Labs: All pertinent labs within the past 24 hours have been reviewed.    Significant Imaging: I have reviewed all pertinent imaging results/findings within the past 24 hours.      Assessment/Plan:      * Intra-abdominal abscess  Mr. Page is a 53 YOM with cirrhosis 2/2 to EtOH use and HCV, esophageal varices, GIB, HTN, hypothyroidism, MDD, hydrocephalous, cholelithiasis, who presented to OSH for abdominal pain and is coming to [facility] for IR evaluation of intra-thoracic hematoma and hepatology evaluation. Presented for abdominal pain which has now resolved. CT A/P with IV contrast done and shows upper abdominal fluid collection likely a hematoma, surrounds the liver, stomach, and spleen. Origin is not identified however may represent a variceal or hepatic hematoma. No active extravasation identified. Patient transferred for IR and hepatology evaluation. History of past varices ligation. Has 1.1 cm hypoattenuating lesion in left hepatic lobe.     CT triple phase: loculated ascites; no mass lesions of the liver identified, though arterial phase not assessed.  Ascites cultures: negative gram stain and aerobic culture.  WBC 8030.    PLAN:  IR consulted, appreciate recs   - IR drainage, monitor output   - follow up cultures   - ID consulted, appreciate recs   - on CTX  and metronidazole    Cirrhosis    MELD 3.0: 10 at 8/5/2023  3:20 AM  MELD-Na: 9 at 8/5/2023  3:20 AM  Calculated from:  Serum Creatinine: 0.6 mg/dL (Using min of 1 mg/dL) at 8/5/2023  3:20 AM  Serum Sodium: 138 mmol/L (Using max of 137 mmol/L) at 8/5/2023  3:20 AM  Total Bilirubin: 1.1 mg/dL at 8/5/2023  3:20 AM  Serum Albumin: 2.2 g/dL at 8/5/2023  3:20 AM  INR(ratio): 1.2 at 8/5/2023  3:20 AM  Age at listing (hypothetical): 53 years  Sex: Male at 8/5/2023  3:20 AM    - hepatology consulted, recs appreciated   - patient on nadolol at OSH, started on propranolol this admission  - continue pantoprazole 40 mg from OSH       Altered mental state  Wife reports hallucinations and change in behavior since onset, pt states this occurred once 3 days ago while falling asleep. No known prior episodes. DDx includes hepatic encephalopathy versus delirium    Ammonia 67    PLAN:  - delirium precautions    Normocytic anemia  Patient presents with anemia 12.1 mcv 87 (normocytic anemia 12.6 at OSH). No known baseline hemoglobin. Patient with new hematoma formation.     Iron panel: iron 26, TIBC 250, transferrin 169, Ferritin 213.     Hiccups  Patient with new hiccups for one week while at OSH. possibly 2/2 from loculated ascites causing irritation to phrenic nerve     Improved temporarily with reglan overnight.    PLAN:  - reglan prn    Tongue lesion  Patient with tongue swelling that started day of admission. White and green lesions on exam, looks dry. Dysphagia with dry tongue otherwise able to swallow. No hoarseness    Improved with Nystatin mouthwash    - SLP consulted  - Nystatin QID    Leukocytosis  Patient with leukocytosis that was present at OSH at 17.9 and downtrending to 15.24. patient otherwise is hemodynamically stable. Low concern for sepsis at this time however high chance for decompensation given presence of hematoma    Unclear etiology consider infected hematoma vs stress    - continue to monitor     HTN  (hypertension)  Patient normotensive with hematoma present. Given risk for possible hemorrhagic shock will hold home BP meds     - hold home amlodipine 5   - hold home HCTZ 25     Gallstones  Cholelithiasis without cystitis   Seen on imaging 8/2/23    - monitor ALP and bili    HCV (hepatitis C virus)  Per OSH records patient with history HCV    - HCV lab pending      MDD (major depressive disorder)  - continue home sertraline 50 mg   - continue buspirone 7.5 mg BID    Hypothyroidism  - continue home synthroid 75 mcg      VTE Risk Mitigation (From admission, onward)         Ordered     IP VTE LOW RISK PATIENT  Once         08/03/23 0108     Place sequential compression device  Until discontinued         08/03/23 0108                Discharge Planning   DONIS: 8/6/2023     Code Status: Full Code   Is the patient medically ready for discharge?: No    Reason for patient still in hospital (select all that apply): Laboratory test, Treatment and Consult recommendations  Discharge Plan A: Home with family                  Ailyn Duong DO  Department of Hospital Medicine   [facility] - Surgery

## 2023-08-05 NOTE — SUBJECTIVE & OBJECTIVE
No past medical history on file.    No past surgical history on file.    Review of patient's allergies indicates:  No Known Allergies    Medications:  Medications Prior to Admission   Medication Sig    acetaminophen (TYLENOL) 325 MG tablet Take 650 mg by mouth 2 (two) times daily as needed for Pain.    busPIRone (BUSPAR) 15 MG tablet Take 7.5 mg by mouth 2 (two) times daily.    hydroCHLOROthiazide (HYDRODIURIL) 25 MG tablet Take 25 mg by mouth 2 (two) times daily.    levothyroxine (SYNTHROID) 75 MCG tablet Take 75 mcg by mouth before breakfast.    nadoloL (CORGARD) 20 MG tablet Take 20 mg by mouth once daily.    pantoprazole (PROTONIX) 40 MG tablet Take 40 mg by mouth once daily.    sertraline (ZOLOFT) 50 MG tablet Take 50 mg by mouth once daily.     Antibiotics (From admission, onward)      Start     Stop Route Frequency Ordered    08/04/23 1215  metronidazole IVPB 500 mg         -- IV Every 8 hours (non-standard times) 08/04/23 1100    08/04/23 1200  cefTRIAXone (ROCEPHIN) 2 g in dextrose 5 % in water (D5W) 100 mL IVPB (MB+)         -- IV Every 24 hours (non-standard times) 08/04/23 1100          Antifungals (From admission, onward)      Start     Stop Route Frequency Ordered    08/03/23 1300  nystatin 100,000 unit/mL suspension 500,000 Units         -- Oral 4 times daily 08/03/23 1059          Antivirals (From admission, onward)      None               There is no immunization history on file for this patient.    Family History    None       Social History     Socioeconomic History    Marital status: Unknown     Review of Systems   Constitutional:  Negative for chills, diaphoresis and fever.   HENT:  Negative for sinus pain and sore throat.    Eyes:  Negative for visual disturbance.   Respiratory:  Negative for cough (hiccups) and shortness of breath.    Cardiovascular:  Negative for chest pain and leg swelling.   Gastrointestinal:  Positive for abdominal pain. Negative for nausea and vomiting.        Persistent  hiccups   Genitourinary:  Negative for dysuria.   Musculoskeletal:  Negative for gait problem.   Skin:  Positive for rash.        Psoriasis   Neurological:  Negative for headaches.   Psychiatric/Behavioral:  Negative for agitation and confusion.      Objective:     Vital Signs (Most Recent):  Temp: 97.2 °F (36.2 °C) (08/05/23 1123)  Pulse: 81 (08/05/23 1123)  Resp: 17 (08/05/23 1123)  BP: 113/64 (08/05/23 1123)  SpO2: (!) 93 % (08/05/23 1123) Vital Signs (24h Range):  Temp:  [97.2 °F (36.2 °C)-98.6 °F (37 °C)] 97.2 °F (36.2 °C)  Pulse:  [71-81] 81  Resp:  [16-18] 17  SpO2:  [92 %-98 %] 93 %  BP: (102-131)/(51-73) 113/64     Weight: 92.3 kg (203 lb 7.8 oz)  Body mass index is 28.38 kg/m².    Estimated Creatinine Clearance: 165.3 mL/min (based on SCr of 0.6 mg/dL).     Physical Exam  Vitals and nursing note reviewed.   Constitutional:       General: He is not in acute distress.     Appearance: Normal appearance.   HENT:      Head: Normocephalic and atraumatic.   Eyes:      Extraocular Movements: Extraocular movements intact.      Pupils: Pupils are equal, round, and reactive to light.   Cardiovascular:      Rate and Rhythm: Normal rate and regular rhythm.   Pulmonary:      Effort: Pulmonary effort is normal. No respiratory distress.      Breath sounds: Normal breath sounds. No wheezing.   Abdominal:      General: There is distension.      Tenderness: There is no abdominal tenderness. There is no guarding.   Musculoskeletal:      Cervical back: Normal range of motion and neck supple.   Skin:     General: Skin is warm and dry.      Findings: Rash present.      Comments: Generalized psoriasis   Neurological:      General: No focal deficit present.      Mental Status: He is alert and oriented to person, place, and time.   Psychiatric:         Mood and Affect: Mood normal.         Behavior: Behavior normal.          Significant Labs: All pertinent labs within the past 24 hours have been reviewed.    Significant Imaging: I  have reviewed all pertinent imaging results/findings within the past 24 hours.

## 2023-08-05 NOTE — MEDICAL/APP STUDENT
[facility] Medicine  Progress Note    Patient Name: Gregg Page  MRN: [id number]  Patient Class: IP- Inpatient   Admission Date: 8/3/2023  Length of Stay: 2 days  Attending Physician: Rosalind Borden MD  Primary Care Provider: Katya, Primary Doctor        Subjective:     Principal Problem:Intra-abdominal abscess        HPI:  Pt is 53 y.o. male w/pmx of cirrhosis 2/2 to HCV and EtOH use, esophageal varices w/ band ligation, recurrent cholelithiasis, HTN, hypothyroidism, psoriasis, MDD, and hydrocephalus, who presented to [facility] for abdominal and chest pain. Pt was transferred to [facility] for IR evaluation of intra-abdominal fluid collection possibly a variceal or hepatic hematoma. Patient was hospitalized in June for similar presentation which lead to an MRCP being done. Pt reports the abdominal pain has been present for 6 days prior to presentation at [facility]. Denies BRBPR, melena, n/v or diarrhea. The abdominal pain is not continuing and CP getting better on admission. Pt complains of intractable hiccups which started with the abdominal pain and also has tongue swelling and pain. Denied dysphagia. No recent procedure, livery biopsy, and trauma to be noted. Pt states that the abdominal pain might have been contributed by use of testosterone injection that pt purchased on the Internet.     Referring facility/physician notes vitals stable. Labs concerning with WBC at 17.9, Hb 12.6, Hct 37.5, platelets 123. Ammonia level 50, PT-INR 1.31, Troponin and BNP WNL. UA negative and CXR without any acute process.      CT A/P w contrast shows intra-abdominal fluid suggesting of hematoma that surrounds the left hepatic lobe, stomach, and spleen. No active extravasation identified. Measurement of 21.7cm or greater in transverse diameter. 1.1 Cm hypo attenuating lesion seen on left hepatic lobe. Cholelithiasis without signs of cholecystis.      Hepatology and IR was consulted for further  recs.      Overview/Hospital Course:  08/03 On admission, labs ordered and concerning for Procal of 0.59, H/H at 12.1 and 25.6, WBC of 15.24, platelet of 101, potassium at 2.9. Blood culture ordered. CXR shows left lower lobe yeison opacity. Per IR, ordered CT a/p triple phase. 8/4: CT showed large loculated simple fluid density collection surrounding liver, spleen and lesser sac of stomach. Also showed portal hypertension with splenomegaly. No hepatic mass seen. Per Hep and IR scan suggestive of abscess. Started on Ceftriaxone, and flagyl. IR carried out drainage of abscess which resulted in 450 ml of serous fluid. Fluid cultures non-significant at this time     Interval History: NAEO, Tongue pain and lesion seem to be improving. No improvement in hiccups with reglan. Intact mental status.Drain is working well      Review of Systems   Constitutional: Negative.    HENT: Negative.     Eyes: Negative.    Respiratory:  Positive for choking. Negative for chest tightness.         Hiccups    Cardiovascular: Negative.  Negative for chest pain and leg swelling.   Gastrointestinal:  Positive for abdominal distention and abdominal pain. Negative for diarrhea, nausea and vomiting.   Endocrine: Negative.    Genitourinary: Negative.    Musculoskeletal: Negative.    Skin:  Positive for rash.        Psoriasis rash   Allergic/Immunologic: Negative.    Neurological: Negative.    Hematological: Negative.    Psychiatric/Behavioral: Negative.       Objective:     Vitals:    08/05/23 0709 08/05/23 0839 08/05/23 1123 08/05/23 1519   BP:  131/73 113/64 117/67   BP Location:       Patient Position:  Lying     Pulse: 78 71 81 77   Resp:  18 17 17   Temp:  98.1 °F (36.7 °C) 97.2 °F (36.2 °C) 97.5 °F (36.4 °C)   TempSrc:  Oral     SpO2:  98% (!) 93% 95%   Weight:       Height:          Weight: 92.3 kg (203 lb 7.8 oz)  Body mass index is 28.38 kg/m².    Intake/Output Summary (Last 24 hours) at 8/5/2023 1710  Last data filed at 8/5/2023  "0531  Gross per 24 hour   Intake --   Output 80 ml   Net -80 ml     Physical Exam  Constitutional:       Appearance: Normal appearance.   HENT:      Mouth/Throat:      Pharynx: Posterior oropharyngeal erythema present.      Comments: Tongue slightly red    Eyes:      Extraocular Movements: Extraocular movements intact.      Pupils: Pupils are equal, round, and reactive to light.   Cardiovascular:      Rate and Rhythm: Normal rate and regular rhythm.      Pulses: Normal pulses.      Heart sounds: Normal heart sounds. No murmur heard.  Pulmonary:      Effort: Pulmonary effort is normal. No respiratory distress.      Breath sounds: Normal breath sounds.   Abdominal:      General: There is distension.      Tenderness: There is abdominal tenderness.      Comments: RUQ tenderness     Musculoskeletal:         General: Normal range of motion.   Skin:     General: Skin is warm.      Capillary Refill: Capillary refill takes less than 2 seconds.      Coloration: Skin is not jaundiced.      Findings: Rash present. No bruising.   Neurological:      Mental Status: He is alert and oriented to person, place, and time.   Psychiatric:         Mood and Affect: Mood normal.         Significant Labs: All pertinent labs within the past 24 hours have been reviewed.  Blood Culture: No results for input(s): "LABBLOO" in the last 48 hours.  CBC:   Recent Labs   Lab 08/04/23  0334 08/05/23  0320   WBC 16.28* 15.66*   HGB 12.0* 12.2*   HCT 36.3* 36.7*   * 104*     CMP:   Recent Labs   Lab 08/04/23  0334 08/05/23  0320    138   K 3.2* 3.0*    102   CO2 26 24   GLU 96 94   BUN 10 8   CREATININE 0.6 0.6   CALCIUM 7.9* 7.8*   PROT 5.3* 5.4*   ALBUMIN 2.2* 2.2*   BILITOT 1.6* 1.1*   ALKPHOS 117 127   AST 27 24   ALT 17 15   ANIONGAP 10 12     Magnesium:   Recent Labs   Lab 08/04/23  0334 08/05/23  0320   MG 2.1 1.9     Recent Labs     08/05/23  0320   INR 1.2         BCX: NGTD    Significant Imaging: I have reviewed all pertinent " imaging results/findings within the past 24 hours.      Assessment/Plan:      * Intra-abdominal abscess   Pt is 53 y.o. male w/pmx of cirrhosis 2/2 to HCV and EtOH use, esophageal varices w/ band ligation, recurrent cholelithiasis, HTN, hypothyroidism, psoriasis, MDD, and hydrocephalus currently being worked up for intra-abdominal fluids seen on imaging.  CT Triple phase showed large loculated simple fluid density collection surrounding liver, spleen and lesser sac of stomach. Singes of portal hypertension, and cholelithiasis with no signs of cholecystitis.  IR and Hep consulted for further management.           PLAN:  IR and Hep recs: Due to absence of recent trauma and procedures, mostly likely an abscess. IR drained 450ml serous fluid   - f/u fluid culture   - monitor drain output   - Continue antibiotics with Ceftriaxone and Flagyl   - ID consulted, appreciate recs     Altered mental state  Pt's wife reports of changed behavior. Ddx: hepatic encephalopathy or delirium. Ammonia level measured in OMS at 50.     Ammonia at 67     PLAN:  - Start lactulose   - delirium precautions  - Stopped Reglan and baclofen which might contribute to AMS symptoms        Tongue lesion  Pt presented with white lesions and pain on admission. No dysphagia or hoarseness noted. Started on Nystatin and magic mouthwash.      - SLP consulted  - Continue Nystatin      Leukocytosis  Pt had a WBC of 17.9 on admission. Currently trending at 16.28. Hemodynamically stable. No signs of sepsis noted. Possibly due to presence of intra-abdominal fluids, abscess, or stress     - continue on Ceftriaxone and flagyl      HTN (hypertension)      - hold home amlodipine 5   - hold home HCTZ 25      Gallstones  Cholelithiasis without wall thickening or signs of cystitis      - Daily CMP to monitor ALP and bili     Hiccups  Pt presented with intractable hiccups that started with the abdominal pain. Possibly due to irritation of phrenic nerve or diaphragm by  the intra-abdominal fluid      Tried Reglan and baclofen which resulted in minimal improvement  After operation, hiccups still persisting     PLAN:  - D/C baclofen and Reglan due to AMS  - start Chlorpromazine injection prn     HCV (hepatitis C virus)  Patient with history HCV     - HCV RNA pending        MDD (major depressive disorder)  - continue home sertraline 50 mg   - continue buspirone 7.5 mg BID     Cirrhosis     MELD 3.0: 13 at 8/4/2023  3:34 AM  MELD-Na: 10 at 8/4/2023  3:34 AM  Calculated from:  Serum Creatinine: 0.6 mg/dL (Using min of 1 mg/dL) at 8/4/2023  3:34 AM  Serum Sodium: 136 mmol/L at 8/4/2023  3:34 AM  Total Bilirubin: 1.6 mg/dL at 8/4/2023  3:34 AM  Serum Albumin: 2.2 g/dL at 8/4/2023  3:34 AM  INR(ratio): 1.2 at 8/3/2023  2:56 AM  Age at listing (hypothetical): 53 years  Sex: Male at 8/4/2023  3:34 AM     - hepatology consulted, recs appreciated   - patient on home nadolol. started on propranolol this admission  - continue pantoprazole 40 mg from OSH         Hypothyroidism  - continue home synthroid 75 mcg     Normocytic anemia  Patient presents with anemia 12.1 mcv 87 (normocytic anemia 12.6 at OSH). No known baseline hemoglobin. Patient with new hematoma formation.      Iron panel: iron 26, TIBC 250, transferrin 169, Ferritin 213.     VTE Risk Mitigation (From admission, onward)           Ordered     IP VTE LOW RISK PATIENT  Once         08/03/23 0108     Place sequential compression device  Until discontinued         08/03/23 0108                    Discharge Planning   DONIS: 8/7/2023     Code Status: Full Code   Is the patient medically ready for discharge?: No    Reason for patient still in hospital (select all that apply): Patient trending condition and Treatment  Discharge Plan A: Home with family                  Juan Hunt MS3  Department of Hospital Medicine   [facility] - Surgery

## 2023-08-05 NOTE — ASSESSMENT & PLAN NOTE
"53M with cirrhosis 2/2 to EtOH use and HCV 2/2 IVDU presented for R-sided abdominal pain, found to have upper abdominal fluid collection w/ loculations around L hepatic lobe without clear origin etiology. Fluid culture showing negative gram stain, negative aerobes, other cultures pending. On CTX + metronidazole. ID consulted for "loculated ascitis thought likely abscess with leukocytosis"    - Continue current abx regimen  - F/u cultures  "

## 2023-08-05 NOTE — SUBJECTIVE & OBJECTIVE
Interval History: NAEO. Reports initial improvement of hiccups post drainage, however, have since returned. He does not think the Reglan improves his symptoms. His episode of hallucinations occurred once 3 days ago while falling asleep.    Review of Systems   Constitutional:  Negative for chills, diaphoresis and fever.   Eyes:  Negative for visual disturbance.   Respiratory:  Positive for cough (hiccups). Negative for shortness of breath.    Cardiovascular:  Negative for chest pain and leg swelling.   Gastrointestinal:  Positive for abdominal pain. Negative for nausea and vomiting.   Genitourinary:  Negative for dysuria.   Musculoskeletal:  Negative for gait problem.   Skin:  Positive for rash.   Neurological:  Negative for headaches.   Psychiatric/Behavioral:  Positive for hallucinations.      Objective:     Vital Signs (Most Recent):  Temp: 98.1 °F (36.7 °C) (08/05/23 0839)  Pulse: 71 (08/05/23 0839)  Resp: 18 (08/05/23 0839)  BP: 131/73 (08/05/23 0839)  SpO2: 98 % (08/05/23 0839) Vital Signs (24h Range):  Temp:  [96.4 °F (35.8 °C)-98.6 °F (37 °C)] 98.1 °F (36.7 °C)  Pulse:  [69-78] 71  Resp:  [15-21] 18  SpO2:  [91 %-98 %] 98 %  BP: (102-134)/(51-75) 131/73     Weight: 92.3 kg (203 lb 7.8 oz)  Body mass index is 28.38 kg/m².    Intake/Output Summary (Last 24 hours) at 8/5/2023 0842  Last data filed at 8/5/2023 0531  Gross per 24 hour   Intake --   Output 690 ml   Net -690 ml         Physical Exam  Vitals and nursing note reviewed.   Constitutional:       General: He is not in acute distress.     Appearance: Normal appearance.   HENT:      Head: Normocephalic and atraumatic.   Eyes:      Extraocular Movements: Extraocular movements intact.      Pupils: Pupils are equal, round, and reactive to light.   Cardiovascular:      Rate and Rhythm: Normal rate and regular rhythm.   Pulmonary:      Effort: Pulmonary effort is normal. No respiratory distress.      Breath sounds: Normal breath sounds. No wheezing.   Abdominal:       General: There is distension.      Tenderness: There is no abdominal tenderness. There is no guarding.   Musculoskeletal:      Cervical back: Normal range of motion and neck supple.   Skin:     General: Skin is warm and dry.      Findings: Bruising and rash present.   Neurological:      General: No focal deficit present.      Mental Status: He is alert and oriented to person, place, and time.   Psychiatric:         Mood and Affect: Mood normal.         Behavior: Behavior normal.             Significant Labs: All pertinent labs within the past 24 hours have been reviewed.    Significant Imaging: I have reviewed all pertinent imaging results/findings within the past 24 hours.

## 2023-08-05 NOTE — CONSULTS
"[facility] - Surgery  Infectious Disease  Consult Note    Patient Name: Gregg Page  MRN: [id number]  Admission Date: 8/3/2023  Hospital Length of Stay: 2 days  Attending Physician: Rosalind Borden MD  Primary Care Provider: No, Primary Doctor     Isolation Status: No active isolations    Patient information was obtained from ER records.      Inpatient consult to Infectious Diseases  Consult performed by: Emeka Sanchez MD  Consult ordered by: Ailyn Duong DO  Reason for consult: loculated ascitis thought likely abscess with leukocytosis        Assessment/Plan:     GI  * Intra-abdominal abscess  53M with cirrhosis 2/2 to EtOH use and HCV 2/2 IVDU presented for R-sided abdominal pain, found to have upper abdominal fluid collection w/ loculations around L hepatic lobe without clear origin/etiology. Fluid culture showing negative gram stain, negative aerobes, other cultures pending. On CTX + metronidazole. ID consulted for "loculated ascitis thought likely abscess with leukocytosis"    - Continue current abx regimen  - F/u cultures        Thank you for your consult. I will follow-up with patient. Please contact us if you have any additional questions.    Emeka Sanchez MD  Infectious Disease  [facility]    Subjective:     Principal Problem: Intra-abdominal abscess    HPI: 53M with cirrhosis 2/2 to EtOH use and HCV 2/2 IVDU, esophageal varices, GIB, HTN, hypothyroidism, MDD, hydrocephalous, cholelithiasis, who presented to OSH for right sided abdominal pain. He has been seen in the ED multiple times since May 2023 for recurrent R-sided abdominal pain. RUQ US on 8/2 showed multiloculated hypoechoic echogenicity around L hepatic lobe. CT findings include an upper abdominal fluid collection without clear origin or etiology and possibly representing a hematoma without active extravasation, surrounding the liver, stomach, and spleen as well as 1.1 cm hypoattenuating lesion of the left hepatic lobe and cholelithiasis. " "Ascitic fluid culture showing negative gram stain, negative aerobes, other cultures pending. On CTX + metronidazole. ID consulted for "loculated ascitis thought likely abscess with leukocytosis"      No past medical history on file.    No past surgical history on file.    Review of patient's allergies indicates:  No Known Allergies    Medications:  Medications Prior to Admission   Medication Sig    acetaminophen (TYLENOL) 325 MG tablet Take 650 mg by mouth 2 (two) times daily as needed for Pain.    busPIRone (BUSPAR) 15 MG tablet Take 7.5 mg by mouth 2 (two) times daily.    hydroCHLOROthiazide (HYDRODIURIL) 25 MG tablet Take 25 mg by mouth 2 (two) times daily.    levothyroxine (SYNTHROID) 75 MCG tablet Take 75 mcg by mouth before breakfast.    nadoloL (CORGARD) 20 MG tablet Take 20 mg by mouth once daily.    pantoprazole (PROTONIX) 40 MG tablet Take 40 mg by mouth once daily.    sertraline (ZOLOFT) 50 MG tablet Take 50 mg by mouth once daily.     Antibiotics (From admission, onward)      Start     Stop Route Frequency Ordered    08/04/23 1215  metronidazole IVPB 500 mg         -- IV Every 8 hours (non-standard times) 08/04/23 1100    08/04/23 1200  cefTRIAXone (ROCEPHIN) 2 g in dextrose 5 % in water (D5W) 100 mL IVPB (MB+)         -- IV Every 24 hours (non-standard times) 08/04/23 1100          Antifungals (From admission, onward)      Start     Stop Route Frequency Ordered    08/03/23 1300  nystatin 100,000 unit/mL suspension 500,000 Units         -- Oral 4 times daily 08/03/23 1059          Antivirals (From admission, onward)      None               There is no immunization history on file for this patient.    Family History    None       Social History     Socioeconomic History    Marital status: Unknown     Review of Systems   Constitutional:  Negative for chills, diaphoresis and fever.   HENT:  Negative for sinus pain and sore throat.    Eyes:  Negative for visual disturbance.   Respiratory:  Negative " for cough (hiccups) and shortness of breath.    Cardiovascular:  Negative for chest pain and leg swelling.   Gastrointestinal:  Positive for abdominal pain. Negative for nausea and vomiting.        Persistent hiccups   Genitourinary:  Negative for dysuria.   Musculoskeletal:  Negative for gait problem.   Skin:  Positive for rash.        Psoriasis   Neurological:  Negative for headaches.   Psychiatric/Behavioral:  Negative for agitation and confusion.      Objective:     Vital Signs (Most Recent):  Temp: 97.2 °F (36.2 °C) (08/05/23 1123)  Pulse: 81 (08/05/23 1123)  Resp: 17 (08/05/23 1123)  BP: 113/64 (08/05/23 1123)  SpO2: (!) 93 % (08/05/23 1123) Vital Signs (24h Range):  Temp:  [97.2 °F (36.2 °C)-98.6 °F (37 °C)] 97.2 °F (36.2 °C)  Pulse:  [71-81] 81  Resp:  [16-18] 17  SpO2:  [92 %-98 %] 93 %  BP: (102-131)/(51-73) 113/64     Weight: 92.3 kg (203 lb 7.8 oz)  Body mass index is 28.38 kg/m².    Estimated Creatinine Clearance: 165.3 mL/min (based on SCr of 0.6 mg/dL).     Physical Exam  Vitals and nursing note reviewed.   Constitutional:       General: He is not in acute distress.     Appearance: Normal appearance.   HENT:      Head: Normocephalic and atraumatic.   Eyes:      Extraocular Movements: Extraocular movements intact.      Pupils: Pupils are equal, round, and reactive to light.   Cardiovascular:      Rate and Rhythm: Normal rate and regular rhythm.   Pulmonary:      Effort: Pulmonary effort is normal. No respiratory distress.      Breath sounds: Normal breath sounds. No wheezing.   Abdominal:      General: There is distension.      Tenderness: There is no abdominal tenderness. There is no guarding.   Musculoskeletal:      Cervical back: Normal range of motion and neck supple.   Skin:     General: Skin is warm and dry.      Findings: Rash present.      Comments: Generalized psoriasis   Neurological:      General: No focal deficit present.      Mental Status: He is alert and oriented to person, place, and  time.   Psychiatric:         Mood and Affect: Mood normal.         Behavior: Behavior normal.          Significant Labs: All pertinent labs within the past 24 hours have been reviewed.    Significant Imaging: I have reviewed all pertinent imaging results/findings within the past 24 hours.

## 2023-08-05 NOTE — ASSESSMENT & PLAN NOTE
Mr. Page is a 53 YOM with cirrhosis 2/2 to EtOH use and HCV, esophageal varices, GIB, HTN, hypothyroidism, MDD, hydrocephalous, cholelithiasis, who presented to OSH for abdominal pain and [place] [facility] [place] of intra-thoracic hematoma and hepatology evaluation. Presented for abdominal pain which has now resolved. CT A/P with IV contrast done and shows upper abdominal fluid collection likely a hematoma, surrounds the liver, stomach, and spleen. Origin is not identified however may represent a variceal or hepatic hematoma. No active extravasation identified. Patient transferred for IR and hepatology evaluation. History of past varices ligation. Has 1.1 cm hypoattenuating lesion in left hepatic lobe.     CT triple phase: loculated ascites; no mass lesions of the liver identified, though arterial phase not assessed.  Ascites cultures: negative gram stain and aerobic culture.  WBC 8030.    PLAN:  IR consulted, appreciate recs   - IR drainage, monitor output   - follow up cultures   - ID consulted, appreciate recs   - on CTX and metronidazole

## 2023-08-05 NOTE — PLAN OF CARE
Problem: Adult Inpatient Plan of Care  Goal: Plan of Care Review  Outcome: Ongoing, Progressing  Goal: Patient-Specific Goal (Individualized)  Outcome: Ongoing, Progressing  Goal: Absence of Hospital-Acquired Illness or Injury  Outcome: Ongoing, Progressing  Goal: Optimal Comfort and Wellbeing  Outcome: Ongoing, Progressing  Goal: Readiness for Transition of Care  Outcome: Ongoing, Progressing     Patient resting throughout most of the day, did take a shower per request in the afternoon and received an IM injections to help with the hiccups which he stated worked. Stated that his abdominal discomfort is minimal to non now which he is thankful for. Plan of care continues.

## 2023-08-05 NOTE — HPI
"53M with cirrhosis 2/2 to EtOH use and HCV 2/2 IVDU, esophageal varices, GIB, HTN, hypothyroidism, MDD, hydrocephalous, cholelithiasis, who presented to OSH for right sided abdominal pain. He has been seen in the ED multiple times since May 2023 for recurrent R-sided abdominal pain. RUQ US on 8/2 showed multiloculated hypoechoic echogenicity around L hepatic lobe. CT findings include an upper abdominal fluid collection without clear origin or etiology and possibly representing a hematoma without active extravasation, surrounding the liver, stomach, and spleen as well as 1.1 cm hypoattenuating lesion of the left hepatic lobe and cholelithiasis. Ascitic fluid culture showing negative gram stain, negative aerobes, other cultures pending. On CTX + metronidazole. ID consulted for "loculated ascitis thought likely abscess with leukocytosis"  "

## 2023-08-05 NOTE — PROGRESS NOTES
[facility] - [facility]  Hepatology  Progress Note    Patient Name: Gregg Page  MRN: [id number]  Admission Date: 8/3/2023  Hospital Length of Stay: 2 days  Attending Provider: Rosalind Borden MD   Primary Care Physician: No, Primary Doctor  Principal Problem:Intra-abdominal abscess    Subjective:     Transplant status: No    HPI: Mr. Page is a 53 YOM with cirrhosis 2/2 to EtOH use and HCV, esophageal varices, GIB, HTN, hypothyroidism, MDD, hydrocephalous, cholelithiasis, who presented to OSH for right sided abdominal pain. He has been seen in the ED multiple times since May 2023 for recurrent R-sided abdominal pain.   He was seen by [facility] ED for these symptoms. RUQ US on 8/2 notable for cirrhotic liver with interval development (since 7/26/23) of multiloculated hypoechoic echogenicity around L hepatic lobe + cholelithiasis w/o evidence of cholecystitis. CT findings include an upper abdominal fluid collection without clear origin or etiology and possibly representing a hematoma without active extravasation, surrounding the liver, stomach, and spleen as well as 1.1 cm hypoattenuating lesion of the left hepatic lobe and cholelithiasis. He denies any recent trauma. He reports a history of multiple banding procedures of varices, multiple years ago. Denies any recent vomiting, melena, or bloody bowel movements. He has a history of HCV, likely 2/2 IVDU, and completed treatment years ago. At time of my interview,  he has no abdominal pain. Blood counts have been stable through transfer.     Patient transferred [place] [facility] [place]ology/IR evaluation.         Interval History:   S/p intraabdominal drainage of large intraabdominal fluid collection with IR with drain placement. Hiccups and abdominal pain have improved since procedure.WBC of 8,030, suspicious for abscess.     Current Facility-Administered Medications   Medication    (Magic mouthwash) 1:1:1 diphenhydrAMINE(Benadryl) 12.5mg/5ml liq, aluminum & magnesium  hydroxide-simethicone (Maalox), LIDOcaine viscous 2%    [START ON 8/5/2023] acetaminophen tablet 650 mg    baclofen tablet 5 mg    busPIRone split tablet 7.5 mg    dextrose 10% bolus 125 mL 125 mL    dextrose 10% bolus 250 mL 250 mL    glucagon (human recombinant) injection 1 mg    glucose chewable tablet 16 g    glucose chewable tablet 24 g    levothyroxine tablet 75 mcg    metoclopramide HCl tablet 10 mg    naloxone 0.4 mg/mL injection 0.02 mg    nystatin 100,000 unit/mL suspension 500,000 Units    pantoprazole EC tablet 40 mg    potassium bicarbonate disintegrating tablet 20 mEq    propranoloL tablet 10 mg    sertraline tablet 25 mg    sodium chloride 0.9% flush 10 mL       Objective:     Vital Signs (Most Recent):  Temp: 97.8 °F (36.6 °C) (08/04/23 0752)  Pulse: 75 (08/04/23 0752)  Resp: 18 (08/04/23 0752)  BP: (!) 114/57 (08/04/23 0752)  SpO2: (!) 93 % (08/04/23 0752) Vital Signs (24h Range):  Temp:  [97.6 °F (36.4 °C)-98.4 °F (36.9 °C)] 97.8 °F (36.6 °C)  Pulse:  [72-86] 75  Resp:  [16-18] 18  SpO2:  [92 %-94 %] 93 %  BP: (112-137)/(57-72) 114/57     Weight: 92.3 kg (203 lb 7.8 oz) (08/03/23 0045)  Body mass index is 28.38 kg/m².       Physical Exam  Vitals and nursing note reviewed.   Constitutional:       General: He is not in acute distress.     Appearance: He is not toxic-appearing.   HENT:      Head: Normocephalic and atraumatic.   Eyes:      General: No scleral icterus.     Extraocular Movements: Extraocular movements intact.      Pupils: Pupils are equal, round, and reactive to light.   Cardiovascular:      Rate and Rhythm: Normal rate and regular rhythm.      Pulses: Normal pulses.      Heart sounds: Normal heart sounds.   Pulmonary:      Effort: Pulmonary effort is normal.      Breath sounds: Normal breath sounds.   Abdominal:      General: Abdomen is flat. There is no distension.      Palpations: Abdomen is soft. There is no mass.      Tenderness: There is no guarding.      Comments:  Drain in place with serous fluid present; no surrounding erythema at insertion site.   Musculoskeletal:         General: No swelling or tenderness. Normal range of motion.      Cervical back: Normal range of motion.   Skin:     General: Skin is warm and dry.      Capillary Refill: Capillary refill takes less than 2 seconds.      Comments: Diffuse erythematous, polygonal plaques    Neurological:      General: No focal deficit present.      Mental Status: He is alert and oriented to person, place, and time. Mental status is at baseline.      Cranial Nerves: No cranial nerve deficit.   Psychiatric:         Mood and Affect: Mood normal.         Behavior: Behavior normal.            MELD 3.0: 13 at 8/4/2023  3:34 AM  MELD-Na: 10 at 8/4/2023  3:34 AM  Calculated from:  Serum Creatinine: 0.6 mg/dL (Using min of 1 mg/dL) at 8/4/2023  3:34 AM  Serum Sodium: 136 mmol/L at 8/4/2023  3:34 AM  Total Bilirubin: 1.6 mg/dL at 8/4/2023  3:34 AM  Serum Albumin: 2.2 g/dL at 8/4/2023  3:34 AM  INR(ratio): 1.2 at 8/3/2023  2:56 AM  Age at listing (hypothetical): 53 years  Sex: Male at 8/4/2023  3:34 AM      Significant Labs:  Labs within the past month have been reviewed.    Significant Imaging:  CT: Reviewed.    Assessment/Plan:     GI  * Intra-abdominal abscess  53M PMH EtOH cirrhosis c/b esophageal varices s/p multiple banding procedures who presented to [facility] ED for right sided abdominal pain. Transferred to [facility] for hepatology and IR evaluation. RUQ US on 8/2 notable for cirrhotic liver with interval development (since 7/26/23) of multiloculated hypoechoic echogenicity around L hepatic lobe + cholelithiasis w/o evidence of cholecystitis.Initial concern for upper abdominal fluid collection from outside hospital. CT triple phase done here notable for possible loculated ascites collection, cholelithiasis without cholecystitis, and sequela of cirrhosis. S/p IR drainage of 400 mL serous fluid with drain placement on 8/04. WBC  from ascitic fluid 8030. In setting of loculations with leukcyotosis and WBC of >8000 on ascitic fluid, fluid collection likely representative of abscess. Currently on IV Ceftriaxone 2g and Flagyl. T bili downtrending (1.1), otherwise rest of liver enzymes are normal.     Recommendations:  - IR recommendations repeat CTAP once drainage stops.  - Follow up cx data from ascitic fluid. ID has been formally consulted by primary team.     HCV (hepatitis C virus)  Likely 2/2 IVDU. Reports chronic infection and completed treatment. Reactive antibody will always be present 2/2 to previous infection.     - follow up RNA viral load    Thank you for your consult. I will follow-up with patient. Please contact us if you have any additional questions.    Sabra Madrid MD  Hepatology  [facility] - Surgery

## 2023-08-05 NOTE — ASSESSMENT & PLAN NOTE
Patient with tongue swelling that started day of admission. White and green lesions on exam, looks dry. Dysphagia with dry tongue otherwise able to swallow. No hoarseness    Improved with Nystatin mouthwash    - SLP consulted  - Nystatin QID

## 2023-08-05 NOTE — ASSESSMENT & PLAN NOTE
53M PMH EtOH cirrhosis c/b esophageal varices s/p multiple banding procedures who presented to [facility] ED for right sided abdominal pain. Transferred to [facility] for hepatology and IR evaluation. RUQ US on 8/2 notable for cirrhotic liver with interval development (since 7/26/23) of multiloculated hypoechoic echogenicity around L hepatic lobe + cholelithiasis w/o evidence of cholecystitis.Initial concern for upper abdominal fluid collection from outside hospital. CT triple phase done here notable for possible loculated ascites collection, cholelithiasis without cholecystitis, and sequela of cirrhosis. S/p IR drainage of 400 mL serous fluid with drain placement on 8/04. WBC from ascitic fluid 8030. In setting of loculations with leukcyotosis and WBC of >8000 on ascitic fluid, fluid collection likely representative of abscess. Currently on IV Ceftriaxone 2g and Flagyl. T bili downtrending (1.1), otherwise rest of liver enzymes are normal.     Recommendations:  - IR recommendations repeat CTAP once drainage stops.  - Follow up cx data from ascitic fluid. ID has been formally consulted by primary team.

## 2023-08-06 LAB
ALBUMIN SERPL BCP-MCNC: 2.1 G/DL (ref 3.5–5.2)
ALP SERPL-CCNC: 108 U/L (ref 55–135)
ALT SERPL W/O P-5'-P-CCNC: 16 U/L (ref 10–44)
ANION GAP SERPL CALC-SCNC: 11 MMOL/L (ref 8–16)
AST SERPL-CCNC: 33 U/L (ref 10–40)
BASOPHILS # BLD AUTO: 0.07 K/UL (ref 0–0.2)
BASOPHILS NFR BLD: 0.4 % (ref 0–1.9)
BILIRUB SERPL-MCNC: 1 MG/DL (ref 0.1–1)
BUN SERPL-MCNC: 8 MG/DL (ref 6–20)
CALCIUM SERPL-MCNC: 7.9 MG/DL (ref 8.7–10.5)
CHLORIDE SERPL-SCNC: 103 MMOL/L (ref 95–110)
CO2 SERPL-SCNC: 23 MMOL/L (ref 23–29)
CREAT SERPL-MCNC: 0.6 MG/DL (ref 0.5–1.4)
DIFFERENTIAL METHOD: ABNORMAL
EOSINOPHIL # BLD AUTO: 0.2 K/UL (ref 0–0.5)
EOSINOPHIL NFR BLD: 1.1 % (ref 0–8)
ERYTHROCYTE [DISTWIDTH] IN BLOOD BY AUTOMATED COUNT: 15.5 % (ref 11.5–14.5)
EST. GFR  (NO RACE VARIABLE): >60 ML/MIN/1.73 M^2
GLUCOSE SERPL-MCNC: 96 MG/DL (ref 70–110)
HCT VFR BLD AUTO: 35.5 % (ref 40–54)
HGB BLD-MCNC: 11.8 G/DL (ref 14–18)
IMM GRANULOCYTES # BLD AUTO: 0.22 K/UL (ref 0–0.04)
IMM GRANULOCYTES NFR BLD AUTO: 1.3 % (ref 0–0.5)
INR PPP: 1.3 (ref 0.8–1.2)
LYMPHOCYTES # BLD AUTO: 1.4 K/UL (ref 1–4.8)
LYMPHOCYTES NFR BLD: 8.3 % (ref 18–48)
MAGNESIUM SERPL-MCNC: 1.8 MG/DL (ref 1.6–2.6)
MCH RBC QN AUTO: 29.6 PG (ref 27–31)
MCHC RBC AUTO-ENTMCNC: 33.2 G/DL (ref 32–36)
MCV RBC AUTO: 89 FL (ref 82–98)
MONOCYTES # BLD AUTO: 1.7 K/UL (ref 0.3–1)
MONOCYTES NFR BLD: 9.8 % (ref 4–15)
NEUTROPHILS # BLD AUTO: 13.4 K/UL (ref 1.8–7.7)
NEUTROPHILS NFR BLD: 79.1 % (ref 38–73)
NRBC BLD-RTO: 0 /100 WBC
PHOSPHATE SERPL-MCNC: 2.6 MG/DL (ref 2.7–4.5)
PLATELET # BLD AUTO: 108 K/UL (ref 150–450)
PMV BLD AUTO: 11.5 FL (ref 9.2–12.9)
POTASSIUM SERPL-SCNC: 3.5 MMOL/L (ref 3.5–5.1)
PROT SERPL-MCNC: 5.1 G/DL (ref 6–8.4)
PROTHROMBIN TIME: 13.5 SEC (ref 9–12.5)
RBC # BLD AUTO: 3.99 M/UL (ref 4.6–6.2)
SODIUM SERPL-SCNC: 137 MMOL/L (ref 136–145)
WBC # BLD AUTO: 16.99 K/UL (ref 3.9–12.7)

## 2023-08-06 PROCEDURE — 99232 PR SUBSEQUENT HOSPITAL CARE,LEVL II: ICD-10-PCS | Mod: ,,, | Performed by: HOSPITALIST

## 2023-08-06 PROCEDURE — 84100 ASSAY OF PHOSPHORUS: CPT

## 2023-08-06 PROCEDURE — 83735 ASSAY OF MAGNESIUM: CPT

## 2023-08-06 PROCEDURE — 80053 COMPREHEN METABOLIC PANEL: CPT

## 2023-08-06 PROCEDURE — 21400001 HC TELEMETRY ROOM

## 2023-08-06 PROCEDURE — 25000003 PHARM REV CODE 250: Performed by: STUDENT IN AN ORGANIZED HEALTH CARE EDUCATION/TRAINING PROGRAM

## 2023-08-06 PROCEDURE — 63600175 PHARM REV CODE 636 W HCPCS: Performed by: STUDENT IN AN ORGANIZED HEALTH CARE EDUCATION/TRAINING PROGRAM

## 2023-08-06 PROCEDURE — 99232 SBSQ HOSP IP/OBS MODERATE 35: CPT | Mod: ,,, | Performed by: HOSPITALIST

## 2023-08-06 PROCEDURE — 85025 COMPLETE CBC W/AUTO DIFF WBC: CPT

## 2023-08-06 PROCEDURE — 85610 PROTHROMBIN TIME: CPT | Performed by: STUDENT IN AN ORGANIZED HEALTH CARE EDUCATION/TRAINING PROGRAM

## 2023-08-06 PROCEDURE — 36415 COLL VENOUS BLD VENIPUNCTURE: CPT

## 2023-08-06 PROCEDURE — 25000003 PHARM REV CODE 250

## 2023-08-06 RX ADMIN — NYSTATIN 500000 UNITS: 100000 SUSPENSION ORAL at 08:08

## 2023-08-06 RX ADMIN — LEVOTHYROXINE SODIUM 75 MCG: 75 TABLET ORAL at 05:08

## 2023-08-06 RX ADMIN — POTASSIUM BICARBONATE 20 MEQ: 391 TABLET, EFFERVESCENT ORAL at 08:08

## 2023-08-06 RX ADMIN — PROPRANOLOL HYDROCHLORIDE 10 MG: 10 TABLET ORAL at 08:08

## 2023-08-06 RX ADMIN — LACTULOSE 15 G: 20 SOLUTION ORAL at 09:08

## 2023-08-06 RX ADMIN — SERTRALINE HYDROCHLORIDE 25 MG: 25 TABLET ORAL at 09:08

## 2023-08-06 RX ADMIN — CHLORPROMAZINE HYDROCHLORIDE 25 MG: 25 INJECTION INTRAMUSCULAR at 02:08

## 2023-08-06 RX ADMIN — NYSTATIN 500000 UNITS: 100000 SUSPENSION ORAL at 09:08

## 2023-08-06 RX ADMIN — CEFTRIAXONE 2 G: 2 INJECTION, POWDER, FOR SOLUTION INTRAMUSCULAR; INTRAVENOUS at 12:08

## 2023-08-06 RX ADMIN — POTASSIUM BICARBONATE 20 MEQ: 391 TABLET, EFFERVESCENT ORAL at 09:08

## 2023-08-06 RX ADMIN — METRONIDAZOLE 500 MG: 500 INJECTION, SOLUTION INTRAVENOUS at 01:08

## 2023-08-06 RX ADMIN — METRONIDAZOLE 500 MG: 500 INJECTION, SOLUTION INTRAVENOUS at 05:08

## 2023-08-06 RX ADMIN — LACTULOSE 15 G: 20 SOLUTION ORAL at 08:08

## 2023-08-06 RX ADMIN — BUSPIRONE HYDROCHLORIDE 7.5 MG: 5 TABLET ORAL at 09:08

## 2023-08-06 RX ADMIN — PANTOPRAZOLE SODIUM 40 MG: 40 TABLET, DELAYED RELEASE ORAL at 09:08

## 2023-08-06 RX ADMIN — CHLORPROMAZINE HYDROCHLORIDE 25 MG: 25 INJECTION INTRAMUSCULAR at 11:08

## 2023-08-06 RX ADMIN — NYSTATIN 500000 UNITS: 100000 SUSPENSION ORAL at 05:08

## 2023-08-06 RX ADMIN — BUSPIRONE HYDROCHLORIDE 7.5 MG: 5 TABLET ORAL at 08:08

## 2023-08-06 RX ADMIN — NYSTATIN 500000 UNITS: 100000 SUSPENSION ORAL at 01:08

## 2023-08-06 RX ADMIN — PROPRANOLOL HYDROCHLORIDE 10 MG: 10 TABLET ORAL at 09:08

## 2023-08-06 RX ADMIN — METRONIDAZOLE 500 MG: 500 INJECTION, SOLUTION INTRAVENOUS at 08:08

## 2023-08-06 NOTE — TREATMENT PLAN
Hepatology Treatment Plan    Gregg Page is a 53 y.o. male admitted to hospital 8/3/2023 (Hospital Day: 4) due to Intra-abdominal abscess.     Interval History  Awaiting cx date from ascitic fluid. ID has been formally consulted.     Objective  Temp:  [96.4 °F (35.8 °C)-98.3 °F (36.8 °C)] 96.4 °F (35.8 °C) (08/06 1134)  Pulse:  [65-89] 83 (08/06 1140)  BP: (108-134)/(53-70) 113/70 (08/06 1134)  Resp:  [17-18] 17 (08/06 1134)  SpO2:  [90 %-95 %] 90 % (08/06 1134)    Laboratory    Lab Results   Component Value Date    WBC 16.99 (H) 08/06/2023    HGB 11.8 (L) 08/06/2023    HCT 35.5 (L) 08/06/2023    MCV 89 08/06/2023     (L) 08/06/2023       Lab Results   Component Value Date     08/06/2023    K 3.5 08/06/2023     08/06/2023    CO2 23 08/06/2023    BUN 8 08/06/2023    CREATININE 0.6 08/06/2023    CALCIUM 7.9 (L) 08/06/2023       Lab Results   Component Value Date    ALBUMIN 2.1 (L) 08/06/2023    ALT 16 08/06/2023    AST 33 08/06/2023    ALKPHOS 108 08/06/2023    BILITOT 1.0 08/06/2023       Lab Results   Component Value Date    INR 1.3 (H) 08/06/2023    INR 1.2 08/05/2023    INR 1.2 08/03/2023       MELD 3.0: 11 at 8/6/2023  5:03 AM  MELD-Na: 9 at 8/6/2023  5:03 AM  Calculated from:  Serum Creatinine: 0.6 mg/dL (Using min of 1 mg/dL) at 8/6/2023  5:03 AM  Serum Sodium: 137 mmol/L at 8/6/2023  5:03 AM  Total Bilirubin: 1.0 mg/dL at 8/6/2023  5:03 AM  Serum Albumin: 2.1 g/dL at 8/6/2023  5:03 AM  INR(ratio): 1.3 at 8/6/2023  5:03 AM  Age at listing (hypothetical): 53 years  Sex: Male at 8/6/2023  5:03 AM      Assessment  53M with EtOH cirrhosis c/b esophageal varices s/p multiple banding procedures, chronic HCV (treated) who presented to [facility] ED for right sided abdominal pain. [place] [facility] [place]. RUQ US on 8/2 notable for cirrhotic liver with interval development (since 7/26/23) of multiloculated hypoechoic echogenicity around L hepatic lobe + cholelithiasis  w/o evidence of cholecystitis.Initial concern for upper abdominal fluid collection from outside hospital. CT triple phase done here notable for possible loculated ascites collection, cholelithiasis without cholecystitis, and sequela of cirrhosis. S/p IR drainage of 400 mL serous fluid with drain placement on 8/04. WBC from ascitic fluid 8030. In setting of loculations with leukcyotosis and WBC of >8000 on ascitic fluid, fluid collection likely representative of abscess. Currently on IV Ceftriaxone 2g and Flagyl. Liver enzymes have normalized.      Recommendations:  - IR recommendations repeat CTAP once drainage stops.  - Follow up cx data from ascitic fluid. ID has been formally consulted by primary team.   - Follow up RNA viral load  - Will sign-off.    Thank you for involving us in the care of Gregg Page. Please call with any additional concerns or questions.    Sabra Madrid MD  Gastroenterology Fellow  [facility]

## 2023-08-06 NOTE — SUBJECTIVE & OBJECTIVE
Interval History: NAEON    Review of Systems   Constitutional:  Negative for chills, diaphoresis and fever.   HENT:  Negative for sinus pain and sore throat.    Eyes:  Negative for visual disturbance.   Respiratory:  Negative for cough (hiccups) and shortness of breath.    Cardiovascular:  Negative for chest pain and leg swelling.   Gastrointestinal:  Negative for abdominal pain, nausea and vomiting.   Genitourinary:  Negative for dysuria.   Musculoskeletal:  Negative for gait problem.   Skin:  Positive for rash.        Psoriasis   Neurological:  Negative for headaches.   Psychiatric/Behavioral:  Negative for agitation and confusion.      Objective:     Vital Signs (Most Recent):  Temp: 96.4 °F (35.8 °C) (08/06/23 1134)  Pulse: 83 (08/06/23 1140)  Resp: 17 (08/06/23 1134)  BP: 113/70 (08/06/23 1134)  SpO2: (!) 90 % (08/06/23 1134) Vital Signs (24h Range):  Temp:  [96.4 °F (35.8 °C)-98.3 °F (36.8 °C)] 96.4 °F (35.8 °C)  Pulse:  [65-89] 83  Resp:  [17-18] 17  SpO2:  [90 %-95 %] 90 %  BP: (108-134)/(53-70) 113/70     Weight: 92.3 kg (203 lb 7.8 oz)  Body mass index is 28.38 kg/m².    Estimated Creatinine Clearance: 165.3 mL/min (based on SCr of 0.6 mg/dL).     Physical Exam  Vitals and nursing note reviewed.   Constitutional:       General: He is not in acute distress.     Appearance: Normal appearance.   HENT:      Head: Normocephalic and atraumatic.   Eyes:      Extraocular Movements: Extraocular movements intact.      Pupils: Pupils are equal, round, and reactive to light.   Cardiovascular:      Rate and Rhythm: Normal rate and regular rhythm.   Pulmonary:      Effort: Pulmonary effort is normal. No respiratory distress.      Breath sounds: Normal breath sounds. No wheezing.   Abdominal:      Tenderness: There is no abdominal tenderness. There is no guarding.   Musculoskeletal:      Cervical back: Normal range of motion and neck supple.   Skin:     General: Skin is warm and dry.      Findings: Rash present.       Comments: Generalized psoriasis   Neurological:      General: No focal deficit present.      Mental Status: He is alert and oriented to person, place, and time.   Psychiatric:         Mood and Affect: Mood normal.         Behavior: Behavior normal.          Significant Labs: All pertinent labs within the past 24 hours have been reviewed.    Significant Imaging: I have reviewed all pertinent imaging results/findings within the past 24 hours.

## 2023-08-06 NOTE — ASSESSMENT & PLAN NOTE
MELD 3.0: 11 at 8/6/2023  5:03 AM  MELD-Na: 9 at 8/6/2023  5:03 AM  Calculated from:  Serum Creatinine: 0.6 mg/dL (Using min of 1 mg/dL) at 8/6/2023  5:03 AM  Serum Sodium: 137 mmol/L at 8/6/2023  5:03 AM  Total Bilirubin: 1.0 mg/dL at 8/6/2023  5:03 AM  Serum Albumin: 2.1 g/dL at 8/6/2023  5:03 AM  INR(ratio): 1.3 at 8/6/2023  5:03 AM  Age at listing (hypothetical): 53 years  Sex: Male at 8/6/2023  5:03 AM    - hepatology consulted, recs appreciated   - patient on nadolol at OSH, started on propranolol this admission  - continue pantoprazole 40 mg from OSH

## 2023-08-06 NOTE — PROGRESS NOTES
"[facility]  Infectious Disease  Progress Note    Patient Name: Gregg Page  MRN: [id number]  Admission Date: 8/3/2023  Length of Stay: 3 days  Attending Physician: Rosalind Borden MD  Primary Care Provider: No, Primary Doctor    Isolation Status: No active isolations  Assessment/Plan:      GI  * Intra-abdominal abscess  53M with cirrhosis 2/2 to EtOH use and HCV 2/2 IVDU presented for R-sided abdominal pain, found to have upper abdominal fluid collection w/ loculations around L hepatic lobe without clear origin etiology. Fluid culture showing negative gram stain, negative aerobes, other cultures pending. On CTX + metronidazole. ID consulted for "loculated ascitis thought likely abscess with leukocytosis"    - Continue current abx regimen  - F/u cultures        Anticipated Disposition: per primary    Thank you for your consult. I will follow-up with patient. Please contact us if you have any additional questions.    Emeka Sanchez MD  Infectious Disease  [facility]    Subjective:     Principal Problem:Intra-abdominal abscess    HPI: 53M with cirrhosis 2/2 to EtOH use and HCV 2/2 IVDU, esophageal varices, GIB, HTN, hypothyroidism, MDD, hydrocephalous, cholelithiasis, who presented to OSH for right sided abdominal pain. He has been seen in the ED multiple times since May 2023 for recurrent R-sided abdominal pain. RUQ US on 8/2 showed multiloculated hypoechoic echogenicity around L hepatic lobe. CT findings include an upper abdominal fluid collection without clear origin or etiology and possibly representing a hematoma without active extravasation, surrounding the liver, stomach, and spleen as well as 1.1 cm hypoattenuating lesion of the left hepatic lobe and cholelithiasis. Ascitic fluid culture showing negative gram stain, negative aerobes, other cultures pending. On CTX + metronidazole. ID consulted for "loculated ascitis thought likely abscess with leukocytosis"    Interval History: NAEON    Review of " Systems   Constitutional:  Negative for chills, diaphoresis and fever.   HENT:  Negative for sinus pain and sore throat.    Eyes:  Negative for visual disturbance.   Respiratory:  Negative for cough (hiccups) and shortness of breath.    Cardiovascular:  Negative for chest pain and leg swelling.   Gastrointestinal:  Negative for abdominal pain, nausea and vomiting.   Genitourinary:  Negative for dysuria.   Musculoskeletal:  Negative for gait problem.   Skin:  Positive for rash.        Psoriasis   Neurological:  Negative for headaches.   Psychiatric/Behavioral:  Negative for agitation and confusion.      Objective:     Vital Signs (Most Recent):  Temp: 96.4 °F (35.8 °C) (08/06/23 1134)  Pulse: 83 (08/06/23 1140)  Resp: 17 (08/06/23 1134)  BP: 113/70 (08/06/23 1134)  SpO2: (!) 90 % (08/06/23 1134) Vital Signs (24h Range):  Temp:  [96.4 °F (35.8 °C)-98.3 °F (36.8 °C)] 96.4 °F (35.8 °C)  Pulse:  [65-89] 83  Resp:  [17-18] 17  SpO2:  [90 %-95 %] 90 %  BP: (108-134)/(53-70) 113/70     Weight: 92.3 kg (203 lb 7.8 oz)  Body mass index is 28.38 kg/m².    Estimated Creatinine Clearance: 165.3 mL/min (based on SCr of 0.6 mg/dL).     Physical Exam  Vitals and nursing note reviewed.   Constitutional:       General: He is not in acute distress.     Appearance: Normal appearance.   HENT:      Head: Normocephalic and atraumatic.   Eyes:      Extraocular Movements: Extraocular movements intact.      Pupils: Pupils are equal, round, and reactive to light.   Cardiovascular:      Rate and Rhythm: Normal rate and regular rhythm.   Pulmonary:      Effort: Pulmonary effort is normal. No respiratory distress.      Breath sounds: Normal breath sounds. No wheezing.   Abdominal:      Tenderness: There is no abdominal tenderness. There is no guarding.   Musculoskeletal:      Cervical back: Normal range of motion and neck supple.   Skin:     General: Skin is warm and dry.      Findings: Rash present.      Comments: Generalized psoriasis    Neurological:      General: No focal deficit present.      Mental Status: He is alert and oriented to person, place, and time.   Psychiatric:         Mood and Affect: Mood normal.         Behavior: Behavior normal.          Significant Labs: All pertinent labs within the past 24 hours have been reviewed.    Significant Imaging: I have reviewed all pertinent imaging results/findings within the past 24 hours.

## 2023-08-06 NOTE — ASSESSMENT & PLAN NOTE
Patient with new hiccups for one week while at OSH. possibly 2/2 from loculated ascites causing irritation to phrenic nerve     Improved with Thorazine. Significantly better now.    PLAN:  - Thorazine PRN

## 2023-08-06 NOTE — ASSESSMENT & PLAN NOTE
Wife reports hallucinations and change in behavior since onset, pt states this occurred once 3 days ago while falling asleep. No known prior episodes. DDx includes hepatic encephalopathy versus delirium    Ammonia 67    PLAN:  - delirium precautions  - lactulose

## 2023-08-06 NOTE — PROGRESS NOTES
[facility] Medicine  Progress Note    Patient Name: Gregg Page  MRN: [id number]  Patient Class: IP- Inpatient   Admission Date: 8/3/2023  Length of Stay: 3 days  Attending Physician: Rosalind Borden MD  Primary Care Provider: Katya, Primary Doctor        Subjective:     Principal Problem:Intra-abdominal abscess        HPI:  Mr. Page is a 53 YOM with cirrhosis 2/2 to EtOH use and HCV, esophageal varices, GIB, HTN, hypothyroidism, MDD, hydrocephalous, cholelithiasis, who presented to OSH for abdominal pain and is coming to [facility] for IR evaluation of intra-thoracic hematoma and hepatology evaluation. Of note, patient was hospitalized 6/23 with similar presentation had MRCP done at that time (unavailable for review).  Abdominal pain radiates to the chest, onset 6 days prior to presentation at OSH. Patient denies melena, BRBPR, continued abdominal pain or CP. Patient states morning of transfer he started having tongue swelling and pain, feels dry, remitted with water, has dysphagia when his tongue is dry only, pain described as burning and located throughout tongue. No recent procedures/liver biopsy done. Patient also states for the past 1 week has had intractable hiccups which cause anxiety.     Patient hemodynamically stable. Labs concerning for new leukocytosis 17.9, normocytic anemia 12.6 ammonia 50, low Hct 32.2, elevated PT 16.6, INR 1.31, Troponin, lipase, U/A, BNP, CXR wnl    CT A/P with IV contrast done and shows upper abdominal fluid collection likely a hematoma, surrounds the liver, stomach, and spleen. Origin is not identified however may represent a variceal or hepatic hematoma. No active extravasation identified. Collection measures greater than 21.7 cm in transverse diameter and largest single pocket measures approx 7.5cm in maximal AP dimension.      Overview/Hospital Course:  Procal 0.59; blood cultures show NGTD. CT triple phase shows loculated ascites and no visualized liver lesion. IR  drainage of presumed intra-abdominal abscess 8/4/2023; pending cxs. Cytology reveals elevated WBC at 8030; aerobic cx negative, gram stain negative. Per IR recs, awaiting until drainage ceases, then will obtain repeat CT abdomen.      Interval History: NAEO. Pt feels much improved in regards to his hiccups and has no new concerns. Denies any recurrence of hallucinations.    Review of Systems   Constitutional:  Negative for chills and fever.   Respiratory:  Negative for cough and shortness of breath.    Cardiovascular:  Negative for chest pain.   Gastrointestinal:  Positive for abdominal pain. Negative for nausea and vomiting.   Musculoskeletal:  Negative for gait problem.   Skin:  Positive for rash.     Objective:     Vital Signs (Most Recent):  Temp: 98.2 °F (36.8 °C) (08/06/23 0744)  Pulse: 78 (08/06/23 0744)  Resp: 17 (08/06/23 0744)  BP: 134/64 (08/06/23 0744)  SpO2: (!) 93 % (08/06/23 0744) Vital Signs (24h Range):  Temp:  [97.2 °F (36.2 °C)-98.3 °F (36.8 °C)] 98.2 °F (36.8 °C)  Pulse:  [65-83] 78  Resp:  [17-18] 17  SpO2:  [92 %-95 %] 93 %  BP: (108-134)/(53-67) 134/64     Weight: 92.3 kg (203 lb 7.8 oz)  Body mass index is 28.38 kg/m².    Intake/Output Summary (Last 24 hours) at 8/6/2023 0911  Last data filed at 8/6/2023 0543  Gross per 24 hour   Intake --   Output 90 ml   Net -90 ml         Physical Exam  Vitals and nursing note reviewed.   Constitutional:       General: He is not in acute distress.     Appearance: Normal appearance. He is not toxic-appearing.   HENT:      Head: Normocephalic and atraumatic.   Eyes:      Extraocular Movements: Extraocular movements intact.      Pupils: Pupils are equal, round, and reactive to light.   Cardiovascular:      Rate and Rhythm: Normal rate and regular rhythm.      Heart sounds: No murmur heard.  Pulmonary:      Effort: Pulmonary effort is normal. No respiratory distress.      Breath sounds: Normal breath sounds. No wheezing.   Abdominal:      General: There is  distension.      Tenderness: There is no abdominal tenderness. There is no guarding.   Musculoskeletal:      Right lower leg: No edema.      Left lower leg: No edema.   Skin:     General: Skin is warm and dry.   Neurological:      Mental Status: He is alert and oriented to person, place, and time.      Gait: Gait normal.   Psychiatric:         Mood and Affect: Mood normal.         Behavior: Behavior normal.             Significant Labs: All pertinent labs within the past 24 hours have been reviewed.    Significant Imaging: I have reviewed all pertinent imaging results/findings within the past 24 hours.      Assessment/Plan:      * Intra-abdominal abscess  Mr. Page is a 53 YOM with cirrhosis 2/2 to EtOH use and HCV, esophageal varices, GIB, HTN, hypothyroidism, MDD, hydrocephalous, cholelithiasis, who presented to OSH for abdominal pain and is coming to [facility] for IR evaluation of intra-thoracic hematoma and hepatology evaluation. Presented for abdominal pain which has now resolved. CT A/P with IV contrast done and shows upper abdominal fluid collection likely a hematoma, surrounds the liver, stomach, and spleen. Origin is not identified however may represent a variceal or hepatic hematoma. No active extravasation identified. Patient transferred for IR and hepatology evaluation. History of past varices ligation. Has 1.1 cm hypoattenuating lesion in left hepatic lobe.     CT triple phase: loculated ascites; no mass lesions of the liver identified, though arterial phase not assessed.  Ascites cultures: negative gram stain and aerobic culture.  WBC 8030.    PLAN:  IR consulted, appreciate recs   - IR drainage, monitor output and obtain repeat imaging when output ceases   - follow up cultures   - ID consulted, appreciate recs   - on CTX and metronidazole    Cirrhosis    MELD 3.0: 11 at 8/6/2023  5:03 AM  MELD-Na: 9 at 8/6/2023  5:03 AM  Calculated from:  Serum Creatinine: 0.6 mg/dL (Using min of 1 mg/dL) at 8/6/2023  5:03  AM  Serum Sodium: 137 mmol/L at 8/6/2023  5:03 AM  Total Bilirubin: 1.0 mg/dL at 8/6/2023  5:03 AM  Serum Albumin: 2.1 g/dL at 8/6/2023  5:03 AM  INR(ratio): 1.3 at 8/6/2023  5:03 AM  Age at listing (hypothetical): 53 years  Sex: Male at 8/6/2023  5:03 AM    - hepatology consulted, recs appreciated   - patient on nadolol at OSH, started on propranolol this admission  - continue pantoprazole 40 mg from OSH       Altered mental state  Wife reports hallucinations and change in behavior since onset, pt states this occurred once 3 days ago while falling asleep. No known prior episodes. DDx includes hepatic encephalopathy versus delirium    Ammonia 67    PLAN:  - delirium precautions  - lactulose    Normocytic anemia  Patient presents with anemia 12.1 mcv 87 (normocytic anemia 12.6 at OSH). No known baseline hemoglobin. Patient with new hematoma formation.     Iron panel: iron 26, TIBC 250, transferrin 169, Ferritin 213.     Hiccups  Patient with new hiccups for one week while at OSH. possibly 2/2 from loculated ascites causing irritation to phrenic nerve     Improved with Thorazine. Significantly better now.    PLAN:  - Thorazine PRN    Tongue lesion  Patient with tongue swelling that started day of admission. White and green lesions on exam, looks dry. Dysphagia with dry tongue otherwise able to swallow. No hoarseness    Improved with Nystatin mouthwash    - SLP consulted  - Nystatin QID    Leukocytosis  Patient with leukocytosis that was present at OSH at 17.9 and downtrending to 15.24. patient otherwise is hemodynamically stable. Low concern for sepsis at this time however high chance for decompensation given presence of hematoma    Unclear etiology consider infected hematoma vs stress    - continue to monitor     HTN (hypertension)  Patient normotensive with hematoma present. Given risk for possible hemorrhagic shock will hold home BP meds     - hold home amlodipine 5   - hold home HCTZ 25      Gallstones  Cholelithiasis without cystitis   Seen on imaging 8/2/23    - monitor ALP and bili    HCV (hepatitis C virus)  Per OSH records patient with history HCV    - HCV lab pending      MDD (major depressive disorder)  - continue home sertraline 50 mg   - continue buspirone 7.5 mg BID    Hypothyroidism  - continue home synthroid 75 mcg      VTE Risk Mitigation (From admission, onward)         Ordered     IP VTE LOW RISK PATIENT  Once         08/03/23 0108     Place sequential compression device  Until discontinued         08/03/23 0108                Discharge Planning   DONIS: 8/7/2023     Code Status: Full Code   Is the patient medically ready for discharge?: No    Reason for patient still in hospital (select all that apply): Treatment, Imaging and Consult recommendations  Discharge Plan A: Home with family                  Ailyn Duong DO  Department of Hospital Medicine   [facility]

## 2023-08-06 NOTE — ASSESSMENT & PLAN NOTE
Mr. Page is a 53 YOM with cirrhosis 2/2 to EtOH use and HCV, esophageal varices, GIB, HTN, hypothyroidism, MDD, hydrocephalous, cholelithiasis, who presented to OSH for abdominal pain and [place] [facility] [place] intra-thoracic hematoma and hepatology evaluation. Presented for abdominal pain which has now resolved. CT A/P with IV contrast done and shows upper abdominal fluid collection likely a hematoma, surrounds the liver, stomach, and spleen. Origin is not identified however may represent a variceal or hepatic hematoma. No active extravasation identified. Patient transferred for IR and hepatology evaluation. History of past varices ligation. Has 1.1 cm hypoattenuating lesion in left hepatic lobe.     CT triple phase: loculated ascites; no mass lesions of the liver identified, though arterial phase not assessed.  Ascites cultures: negative gram stain and aerobic culture.  WBC 8030.    PLAN:  IR consulted, appreciate recs   - IR drainage, monitor output and obtain repeat imaging when output ceases   - follow up cultures   - ID consulted, appreciate recs   - on CTX and metronidazole

## 2023-08-06 NOTE — SUBJECTIVE & OBJECTIVE
Interval History: NAEO. Pt feels much improved in regards to his hiccups and has no new concerns. Denies any recurrence of hallucinations.    Review of Systems   Constitutional:  Negative for chills and fever.   Respiratory:  Negative for cough and shortness of breath.    Cardiovascular:  Negative for chest pain.   Gastrointestinal:  Positive for abdominal pain. Negative for nausea and vomiting.   Musculoskeletal:  Negative for gait problem.   Skin:  Positive for rash.     Objective:     Vital Signs (Most Recent):  Temp: 98.2 °F (36.8 °C) (08/06/23 0744)  Pulse: 78 (08/06/23 0744)  Resp: 17 (08/06/23 0744)  BP: 134/64 (08/06/23 0744)  SpO2: (!) 93 % (08/06/23 0744) Vital Signs (24h Range):  Temp:  [97.2 °F (36.2 °C)-98.3 °F (36.8 °C)] 98.2 °F (36.8 °C)  Pulse:  [65-83] 78  Resp:  [17-18] 17  SpO2:  [92 %-95 %] 93 %  BP: (108-134)/(53-67) 134/64     Weight: 92.3 kg (203 lb 7.8 oz)  Body mass index is 28.38 kg/m².    Intake/Output Summary (Last 24 hours) at 8/6/2023 0911  Last data filed at 8/6/2023 0543  Gross per 24 hour   Intake --   Output 90 ml   Net -90 ml         Physical Exam  Vitals and nursing note reviewed.   Constitutional:       General: He is not in acute distress.     Appearance: Normal appearance. He is not toxic-appearing.   HENT:      Head: Normocephalic and atraumatic.   Eyes:      Extraocular Movements: Extraocular movements intact.      Pupils: Pupils are equal, round, and reactive to light.   Cardiovascular:      Rate and Rhythm: Normal rate and regular rhythm.      Heart sounds: No murmur heard.  Pulmonary:      Effort: Pulmonary effort is normal. No respiratory distress.      Breath sounds: Normal breath sounds. No wheezing.   Abdominal:      General: There is distension.      Tenderness: There is no abdominal tenderness. There is no guarding.   Musculoskeletal:      Right lower leg: No edema.      Left lower leg: No edema.   Skin:     General: Skin is warm and dry.   Neurological:      Mental  Status: He is alert and oriented to person, place, and time.      Gait: Gait normal.   Psychiatric:         Mood and Affect: Mood normal.         Behavior: Behavior normal.             Significant Labs: All pertinent labs within the past 24 hours have been reviewed.    Significant Imaging: I have reviewed all pertinent imaging results/findings within the past 24 hours.

## 2023-08-07 LAB
ALBUMIN SERPL BCP-MCNC: 2 G/DL (ref 3.5–5.2)
ALP SERPL-CCNC: 93 U/L (ref 55–135)
ALT SERPL W/O P-5'-P-CCNC: 14 U/L (ref 10–44)
ANION GAP SERPL CALC-SCNC: 10 MMOL/L (ref 8–16)
APPEARANCE FLD: NORMAL
AST SERPL-CCNC: 31 U/L (ref 10–40)
BACTERIA SPEC AEROBE CULT: NO GROWTH
BASOPHILS # BLD AUTO: 0.06 K/UL (ref 0–0.2)
BASOPHILS NFR BLD: 0.4 % (ref 0–1.9)
BILIRUB SERPL-MCNC: 1.1 MG/DL (ref 0.1–1)
BODY FLD TYPE: NORMAL
BUN SERPL-MCNC: 6 MG/DL (ref 6–20)
CALCIUM SERPL-MCNC: 7.7 MG/DL (ref 8.7–10.5)
CHLORIDE SERPL-SCNC: 103 MMOL/L (ref 95–110)
CO2 SERPL-SCNC: 21 MMOL/L (ref 23–29)
COLOR FLD: YELLOW
CREAT SERPL-MCNC: 0.6 MG/DL (ref 0.5–1.4)
DIFFERENTIAL METHOD: ABNORMAL
EOSINOPHIL # BLD AUTO: 0.2 K/UL (ref 0–0.5)
EOSINOPHIL NFR BLD: 1.3 % (ref 0–8)
ERYTHROCYTE [DISTWIDTH] IN BLOOD BY AUTOMATED COUNT: 15.4 % (ref 11.5–14.5)
EST. GFR  (NO RACE VARIABLE): >60 ML/MIN/1.73 M^2
GLUCOSE SERPL-MCNC: 91 MG/DL (ref 70–110)
HCT VFR BLD AUTO: 35.5 % (ref 40–54)
HCV RNA SERPL QL NAA+PROBE: NOT DETECTED
HCV RNA SPEC NAA+PROBE-ACNC: <12 IU/ML
HGB BLD-MCNC: 11.9 G/DL (ref 14–18)
IMM GRANULOCYTES # BLD AUTO: 0.15 K/UL (ref 0–0.04)
IMM GRANULOCYTES NFR BLD AUTO: 1 % (ref 0–0.5)
INR PPP: 1.3 (ref 0.8–1.2)
LYMPHOCYTES # BLD AUTO: 1.4 K/UL (ref 1–4.8)
LYMPHOCYTES NFR BLD: 8.9 % (ref 18–48)
MAGNESIUM SERPL-MCNC: 1.7 MG/DL (ref 1.6–2.6)
MCH RBC QN AUTO: 29.2 PG (ref 27–31)
MCHC RBC AUTO-ENTMCNC: 33.5 G/DL (ref 32–36)
MCV RBC AUTO: 87 FL (ref 82–98)
MONOCYTES # BLD AUTO: 1.7 K/UL (ref 0.3–1)
MONOCYTES NFR BLD: 11.3 % (ref 4–15)
MONOS+MACROS NFR FLD MANUAL: 1 %
NEUTROPHILS # BLD AUTO: 11.7 K/UL (ref 1.8–7.7)
NEUTROPHILS NFR BLD: 77.1 % (ref 38–73)
NEUTROPHILS NFR FLD MANUAL: 99 %
NRBC BLD-RTO: 0 /100 WBC
PHOSPHATE SERPL-MCNC: 3.1 MG/DL (ref 2.7–4.5)
PLATELET # BLD AUTO: 90 K/UL (ref 150–450)
PMV BLD AUTO: 11.1 FL (ref 9.2–12.9)
POTASSIUM SERPL-SCNC: 3.4 MMOL/L (ref 3.5–5.1)
PROT SERPL-MCNC: 5.2 G/DL (ref 6–8.4)
PROTHROMBIN TIME: 14.2 SEC (ref 9–12.5)
RBC # BLD AUTO: 4.07 M/UL (ref 4.6–6.2)
SODIUM SERPL-SCNC: 134 MMOL/L (ref 136–145)
WBC # BLD AUTO: 15.23 K/UL (ref 3.9–12.7)
WBC # FLD: NORMAL /CU MM

## 2023-08-07 PROCEDURE — 36415 COLL VENOUS BLD VENIPUNCTURE: CPT | Performed by: STUDENT IN AN ORGANIZED HEALTH CARE EDUCATION/TRAINING PROGRAM

## 2023-08-07 PROCEDURE — 21400001 HC TELEMETRY ROOM

## 2023-08-07 PROCEDURE — 85025 COMPLETE CBC W/AUTO DIFF WBC: CPT

## 2023-08-07 PROCEDURE — 63600175 PHARM REV CODE 636 W HCPCS: Performed by: STUDENT IN AN ORGANIZED HEALTH CARE EDUCATION/TRAINING PROGRAM

## 2023-08-07 PROCEDURE — 25000003 PHARM REV CODE 250: Performed by: STUDENT IN AN ORGANIZED HEALTH CARE EDUCATION/TRAINING PROGRAM

## 2023-08-07 PROCEDURE — 83735 ASSAY OF MAGNESIUM: CPT

## 2023-08-07 PROCEDURE — 99232 SBSQ HOSP IP/OBS MODERATE 35: CPT | Mod: ,,, | Performed by: HOSPITALIST

## 2023-08-07 PROCEDURE — 92526 ORAL FUNCTION THERAPY: CPT

## 2023-08-07 PROCEDURE — 80053 COMPREHEN METABOLIC PANEL: CPT

## 2023-08-07 PROCEDURE — 84100 ASSAY OF PHOSPHORUS: CPT

## 2023-08-07 PROCEDURE — 85610 PROTHROMBIN TIME: CPT | Performed by: STUDENT IN AN ORGANIZED HEALTH CARE EDUCATION/TRAINING PROGRAM

## 2023-08-07 PROCEDURE — 89051 BODY FLUID CELL COUNT: CPT | Performed by: INTERNAL MEDICINE

## 2023-08-07 PROCEDURE — 99232 PR SUBSEQUENT HOSPITAL CARE,LEVL II: ICD-10-PCS | Mod: ,,, | Performed by: HOSPITALIST

## 2023-08-07 PROCEDURE — 99233 PR SUBSEQUENT HOSPITAL CARE,LEVL III: ICD-10-PCS | Mod: ,,, | Performed by: INTERNAL MEDICINE

## 2023-08-07 PROCEDURE — 99233 SBSQ HOSP IP/OBS HIGH 50: CPT | Mod: ,,, | Performed by: INTERNAL MEDICINE

## 2023-08-07 PROCEDURE — 25000003 PHARM REV CODE 250

## 2023-08-07 RX ORDER — LACTULOSE 10 G/15ML
10 SOLUTION ORAL 3 TIMES DAILY
Status: DISCONTINUED | OUTPATIENT
Start: 2023-08-07 | End: 2023-08-08

## 2023-08-07 RX ORDER — ENOXAPARIN SODIUM 100 MG/ML
40 INJECTION SUBCUTANEOUS EVERY 24 HOURS
Status: DISCONTINUED | OUTPATIENT
Start: 2023-08-07 | End: 2023-08-17 | Stop reason: HOSPADM

## 2023-08-07 RX ADMIN — NYSTATIN 500000 UNITS: 100000 SUSPENSION ORAL at 09:08

## 2023-08-07 RX ADMIN — BUSPIRONE HYDROCHLORIDE 7.5 MG: 5 TABLET ORAL at 09:08

## 2023-08-07 RX ADMIN — LEVOTHYROXINE SODIUM 75 MCG: 75 TABLET ORAL at 05:08

## 2023-08-07 RX ADMIN — PROPRANOLOL HYDROCHLORIDE 10 MG: 10 TABLET ORAL at 09:08

## 2023-08-07 RX ADMIN — METRONIDAZOLE 500 MG: 500 INJECTION, SOLUTION INTRAVENOUS at 04:08

## 2023-08-07 RX ADMIN — CEFTRIAXONE 2 G: 2 INJECTION, POWDER, FOR SOLUTION INTRAMUSCULAR; INTRAVENOUS at 12:08

## 2023-08-07 RX ADMIN — POTASSIUM BICARBONATE 40 MEQ: 391 TABLET, EFFERVESCENT ORAL at 09:08

## 2023-08-07 RX ADMIN — NYSTATIN 500000 UNITS: 100000 SUSPENSION ORAL at 04:08

## 2023-08-07 RX ADMIN — SERTRALINE HYDROCHLORIDE 25 MG: 25 TABLET ORAL at 09:08

## 2023-08-07 RX ADMIN — LACTULOSE 10 G: 20 SOLUTION ORAL at 09:08

## 2023-08-07 RX ADMIN — LACTULOSE 15 G: 20 SOLUTION ORAL at 09:08

## 2023-08-07 RX ADMIN — METRONIDAZOLE 500 MG: 500 INJECTION, SOLUTION INTRAVENOUS at 01:08

## 2023-08-07 RX ADMIN — PANTOPRAZOLE SODIUM 40 MG: 40 TABLET, DELAYED RELEASE ORAL at 09:08

## 2023-08-07 RX ADMIN — LACTULOSE 10 G: 20 SOLUTION ORAL at 04:08

## 2023-08-07 RX ADMIN — ENOXAPARIN SODIUM 40 MG: 40 INJECTION SUBCUTANEOUS at 04:08

## 2023-08-07 RX ADMIN — METRONIDAZOLE 500 MG: 500 INJECTION, SOLUTION INTRAVENOUS at 09:08

## 2023-08-07 RX ADMIN — NYSTATIN 500000 UNITS: 100000 SUSPENSION ORAL at 12:08

## 2023-08-07 NOTE — PT/OT/SLP PROGRESS
"Speech Language Pathology Treatment/Discharge Summary    Patient Name:  Gregg Page   MRN:  [id number]  Admitting Diagnosis: Intra-abdominal abscess    Recommendations:                 General Recommendations:  Dysphagia therapy  Diet recommendations:  Regular, Thin   Aspiration Precautions: Standard aspiration precautions   General Precautions: Standard, fall  Communication strategies:  none    Assessment:     Gregg Page is a 53 y.o. male who presents with a functional swallow for PO intake of an unmodified diet. No additional skilled speech services required at this time.      Subjective     Spoke with RN prior to session who reported pt has been tolerating regular diet with thin liquids throughout the weekend without signs of airway compromise. Pt found resting in bed upon SLP entry into room. Pt agreeable to participate in all aspects of session.      Patient goals: "I'll eat it if you want" in reference to segun cracker     Pain/Comfort:  Pain Rating 1: 0/10    Respiratory Status: Room air    Objective:     Has the patient been evaluated by SLP for swallowing?   Yes  Keep patient NPO? No   Current Respiratory Status:        Pt seen for ongoing diagnostic dysphagia therapy. Pt placed on regular diet with thin liquids over the weekend by medical team and reported good tolerance of all textures. Provided trials of thin liquids via cup edge and self-regulated bites of segun cracker without oral residue or signs of airway compromise., Discussed diet consistency recommendations and ongoing SLP POC. Pt verbalized understanding and had no additional questions or concerns upon SLP exit.      Goals:   Multidisciplinary Problems       SLP Goals       Not on file              Multidisciplinary Problems (Resolved)          Problem: SLP    Goal Priority Disciplines Outcome   SLP Goal   (Resolved)     SLP Met   Description: Speech Pathology Goals  To be met by 8/17/523    1. Pt will participate in ongoing diagnostic " dysphagia therapy                              Plan:     Patient to be seen:  3 x/week   Plan of Care expires:  09/02/23  Plan of Care reviewed with:  patient   SLP Follow-Up:  No       Discharge recommendations:  other (see comments)   Barriers to Discharge:  None    Time Tracking:     SLP Treatment Date:   08/07/23  Speech Start Time:  1052  Speech Stop Time:  1057     Speech Total Time (min):  5 min    Billable Minutes: Treatment Swallowing Dysfunction 5      08/07/2023

## 2023-08-07 NOTE — SUBJECTIVE & OBJECTIVE
Interval History: no acute events, drain in place with serous drainage noted. No abd pain. Hoping for discharge.     Review of Systems   All other systems reviewed and are negative.    Objective:     Vital Signs (Most Recent):  Temp: 98.5 °F (36.9 °C) (08/07/23 1640)  Pulse: 81 (08/07/23 1640)  Resp: 17 (08/07/23 1640)  BP: (!) 144/77 (08/07/23 1640)  SpO2: 96 % (08/07/23 1640) Vital Signs (24h Range):  Temp:  [96.4 °F (35.8 °C)-98.7 °F (37.1 °C)] 98.5 °F (36.9 °C)  Pulse:  [77-96] 81  Resp:  [17-20] 17  SpO2:  [94 %-96 %] 96 %  BP: (115-144)/(59-77) 144/77     Weight: 92.3 kg (203 lb 7.8 oz)  Body mass index is 28.38 kg/m².    Estimated Creatinine Clearance: 165.3 mL/min (based on SCr of 0.6 mg/dL).     Physical Exam  Constitutional:       General: He is not in acute distress.     Appearance: Normal appearance. He is well-developed. He is not ill-appearing or diaphoretic.   HENT:      Head: Normocephalic and atraumatic.      Right Ear: External ear normal.      Left Ear: External ear normal.      Nose: Nose normal.   Eyes:      General: No scleral icterus.        Right eye: No discharge.         Left eye: No discharge.      Extraocular Movements: Extraocular movements intact.      Conjunctiva/sclera: Conjunctivae normal.   Pulmonary:      Effort: Pulmonary effort is normal. No respiratory distress.      Breath sounds: No stridor.   Abdominal:      General: Abdomen is flat.      Palpations: Abdomen is soft.      Comments: CATRACHITA drain RUQ with serous drainage   Skin:     General: Skin is dry.      Coloration: Skin is not jaundiced or pale.      Findings: No erythema.   Neurological:      General: No focal deficit present.      Mental Status: He is alert and oriented to person, place, and time. Mental status is at baseline.   Psychiatric:         Mood and Affect: Mood normal.         Behavior: Behavior normal.         Thought Content: Thought content normal.         Judgment: Judgment normal.          Significant Labs:  CBC:   Recent Labs   Lab 08/06/23  0503 08/07/23  0705   WBC 16.99* 15.23*   HGB 11.8* 11.9*   HCT 35.5* 35.5*   * 90*     CMP:   Recent Labs   Lab 08/06/23  0503 08/07/23  0705    134*   K 3.5 3.4*    103   CO2 23 21*   GLU 96 91   BUN 8 6   CREATININE 0.6 0.6   CALCIUM 7.9* 7.7*   PROT 5.1* 5.2*   ALBUMIN 2.1* 2.0*   BILITOT 1.0 1.1*   ALKPHOS 108 93   AST 33 31   ALT 16 14   ANIONGAP 11 10     Microbiology Results (last 7 days)       Procedure Component Value Units Date/Time    Blood culture [[id number]] Collected: 08/03/23 1422    Order Status: Completed Specimen: Blood Updated: 08/07/23 1612     Blood Culture, Routine No Growth to date      No Growth to date      No Growth to date      No Growth to date      No Growth to date    Blood culture [[id number]] Collected: 08/03/23 1424    Order Status: Completed Specimen: Blood Updated: 08/07/23 1612     Blood Culture, Routine No Growth to date      No Growth to date      No Growth to date      No Growth to date      No Growth to date    Aerobic culture [[id number]] Collected: 08/04/23 1409    Order Status: Completed Specimen: Abscess from Abdomen Updated: 08/07/23 0732     Aerobic Bacterial Culture No growth    Culture, Anaerobe [[id number]] Collected: 08/04/23 1409    Order Status: Completed Specimen: Abscess from Abdomen Updated: 08/05/23 1441     Anaerobic Culture Culture in progress    Gram stain [[id number]] Collected: 08/04/23 1409    Order Status: Completed Specimen: Abscess from Abdomen Updated: 08/04/23 1846     Gram Stain Result Rare WBC's      No organisms seen    Fungus culture [[id number]] Collected: 08/04/23 1409    Order Status: Sent Specimen: Abscess from Abdomen Updated: 08/04/23 1511            Significant Imaging: I have reviewed all pertinent imaging results/findings within the past 24 hours.

## 2023-08-07 NOTE — ASSESSMENT & PLAN NOTE
Mr. Page is a 53 YOM with cirrhosis 2/2 to EtOH use and HCV, esophageal varices, GIB, HTN, hypothyroidism, MDD, hydrocephalous, cholelithiasis, who presented to OSH for abdominal pain and is coming to [facility] [place] for IR evaluation of intra-thoracic hematoma and hepatology evaluation. Presented for abdominal pain which has now resolved. CT A/P with IV contrast done and shows upper abdominal fluid collection likely a hematoma, surrounds the liver, stomach, and spleen. Origin is not identified however may represent a variceal or hepatic hematoma. No active extravasation identified. Patient transferred for IR and hepatology evaluation. History of past varices ligation. Has 1.1 cm hypoattenuating lesion in left hepatic lobe.     CT triple phase: loculated ascites; no mass lesions of the liver identified, though arterial phase not assessed.  Ascites cultures: negative gram stain and aerobic culture.  WBC 8030.    PLAN:  IR consulted, appreciate recs   - IR drainage, monitor output and obtain repeat imaging when output ceases- plan for CT once output diminished   - follow up cultures   - ID consulted, appreciate recs   - on CTX and metronidazole

## 2023-08-07 NOTE — PLAN OF CARE
Problem: Adult Inpatient Plan of Care  Goal: Plan of Care Review  Outcome: Ongoing, Progressing  Goal: Patient-Specific Goal (Individualized)  Outcome: Ongoing, Progressing  Goal: Absence of Hospital-Acquired Illness or Injury  Outcome: Ongoing, Progressing  Goal: Optimal Comfort and Wellbeing  Outcome: Ongoing, Progressing  Goal: Readiness for Transition of Care  Outcome: Ongoing, Progressing     Patient doing well today, no complaints of pain today. Patient did sleep for most of the morning. Wife was up throughout the day to visit with patient. Patient was concerned about continuing to take Lactulose due to increase in bowel movements and incontinence due to the medication. Patient frequency of medication was decreased to three times a day. Patient continues to get IV antibiotics and other scheduled medications. Very pleasant. Plan of care continues.

## 2023-08-07 NOTE — ASSESSMENT & PLAN NOTE
MELD 3.0: 14 at 8/7/2023  7:05 AM  MELD-Na: 10 at 8/7/2023  7:05 AM  Calculated from:  Serum Creatinine: 0.6 mg/dL (Using min of 1 mg/dL) at 8/7/2023  7:05 AM  Serum Sodium: 134 mmol/L at 8/7/2023  7:05 AM  Total Bilirubin: 1.1 mg/dL at 8/7/2023  7:05 AM  Serum Albumin: 2.0 g/dL at 8/7/2023  7:05 AM  INR(ratio): 1.3 at 8/7/2023  7:05 AM  Age at listing (hypothetical): 53 years  Sex: Male at 8/7/2023  7:05 AM    - hepatology consulted, recs appreciated   - Referred to outpatient hepatologist  - patient on nadolol at OSH, started on propranolol this admission  - continue pantoprazole 40 mg from OSH

## 2023-08-07 NOTE — SUBJECTIVE & OBJECTIVE
Interval History: Hiccups resolved with baclofen overnight, in no distress on exam     Review of Systems   Constitutional:  Negative for chills, diaphoresis and fever.   HENT:  Negative for sinus pain and sore throat.    Eyes:  Negative for visual disturbance.   Respiratory:  Negative for cough (hiccups) and shortness of breath.    Cardiovascular:  Negative for chest pain and leg swelling.   Gastrointestinal:  Negative for abdominal pain, nausea and vomiting.   Genitourinary:  Negative for dysuria.   Musculoskeletal:  Negative for gait problem.   Skin:  Positive for rash.        Psoriasis   Neurological:  Negative for headaches.   Psychiatric/Behavioral:  Negative for agitation and confusion.      Objective:     Vital Signs (Most Recent):  Temp: 96.4 °F (35.8 °C) (08/07/23 0743)  Pulse: 89 (08/07/23 0743)  Resp: 18 (08/07/23 0743)  BP: (!) 115/59 (08/07/23 0743)  SpO2: 95 % (08/07/23 0743) Vital Signs (24h Range):  Temp:  [96.4 °F (35.8 °C)-98.7 °F (37.1 °C)] 96.4 °F (35.8 °C)  Pulse:  [72-96] 89  Resp:  [17-20] 18  SpO2:  [90 %-95 %] 95 %  BP: (113-131)/(59-70) 115/59     Weight: 92.3 kg (203 lb 7.8 oz)  Body mass index is 28.38 kg/m².    Intake/Output Summary (Last 24 hours) at 8/7/2023 0850  Last data filed at 8/7/2023 0400  Gross per 24 hour   Intake 240 ml   Output 125 ml   Net 115 ml         Physical Exam  Vitals and nursing note reviewed.   Constitutional:       General: He is not in acute distress.     Appearance: Normal appearance.   HENT:      Head: Normocephalic and atraumatic.   Eyes:      Extraocular Movements: Extraocular movements intact.      Pupils: Pupils are equal, round, and reactive to light.   Cardiovascular:      Rate and Rhythm: Normal rate and regular rhythm.   Pulmonary:      Effort: Pulmonary effort is normal.   Abdominal:      General: Abdomen is flat.      Comments: CATRACHITA drain in place with serous straw colored fluid   Musculoskeletal:      Cervical back: Normal range of motion and neck  supple.   Skin:     General: Skin is warm and dry.      Findings: Rash present.      Comments: Generalized psoriasis   Neurological:      General: No focal deficit present.      Mental Status: He is alert and oriented to person, place, and time.   Psychiatric:         Mood and Affect: Mood normal.         Behavior: Behavior normal.             Significant Labs: All pertinent labs within the past 24 hours have been reviewed.    Significant Imaging: I have reviewed all pertinent imaging results/findings within the past 24 hours.

## 2023-08-07 NOTE — PROGRESS NOTES
PLAN OF CARE NOTE     Fall bundle in place. Pt. Remained free from falls/rauma/injuries. No complaints of CP/ SOB/discomfort. VSS. RA. Tele, NSR. Pt. Denies pain this shift. Pts' CATRACHITA drain site remains CDI with a total output of 50 cc. Pt. Had 2 loose Bms this shift. Pt. Had repeated episodes of hiccups, given PRN chlorpromazine. Hiccups did not subside, team paged. Dr. Ángel MD approved PRN baclofen, which dose was placed in pts' specific med bin since pt. Fell back asleep & hiccups subsided. The POC reviewed w/ pt. & pts' partner @ bedside, questions were answered & encouraged. No further concerns at this time.

## 2023-08-07 NOTE — PROGRESS NOTES
[facility] Medicine  Progress Note    Patient Name: Gregg Page  MRN: [id number]  Patient Class: IP- Inpatient   Admission Date: 8/3/2023  Length of Stay: 4 days  Attending Physician: Rosalind Borden MD  Primary Care Provider: Katya, Primary Doctor        Subjective:     Principal Problem:Intra-abdominal abscess        HPI:  Mr. Page is a 53 YOM with cirrhosis 2/2 to EtOH use and HCV, esophageal varices, GIB, HTN, hypothyroidism, MDD, hydrocephalous, cholelithiasis, who presented to OSH for abdominal pain and [place] [facility] [place] of intra-thoracic hematoma and hepatology evaluation. Of note, patient was hospitalized 6/23 with similar presentation had MRCP done at that time (unavailable for review).  Abdominal pain radiates to the chest, onset 6 days prior to presentation at OSH. Patient denies melena, BRBPR, continued abdominal pain or CP. Patient states morning of transfer he started having tongue swelling and pain, feels dry, remitted with water, has dysphagia when his tongue is dry only, pain described as burning and located throughout tongue. No recent procedures/liver biopsy done. Patient also states for the past 1 week has had intractable hiccups which cause anxiety.     Patient hemodynamically stable. Labs concerning for new leukocytosis 17.9, normocytic anemia 12.6 ammonia 50, low Hct 32.2, elevated PT 16.6, INR 1.31, Troponin, lipase, U/A, BNP, CXR wnl    CT A/P with IV contrast done and shows upper abdominal fluid collection likely a hematoma, surrounds the liver, stomach, and spleen. Origin is not identified however may represent a variceal or hepatic hematoma. No active extravasation identified. Collection measures greater than 21.7 cm in transverse diameter and largest single pocket measures approx 7.5cm in maximal AP dimension.      Overview/Hospital Course:  Procal 0.59; blood cultures show NGTD. CT triple phase shows loculated ascites and no visualized liver lesion. IR  drainage of presumed intra-abdominal abscess 8/4/2023; pending cxs. Cytology reveals elevated WBC at 8030; aerobic cx negative, gram stain negative. Per IR recs, awaiting until drainage ceases, then will obtain repeat CT abdomen.      Interval History: Hiccups resolved with baclofen overnight, in no distress on exam     Review of Systems   Constitutional:  Negative for chills, diaphoresis and fever.   HENT:  Negative for sinus pain and sore throat.    Eyes:  Negative for visual disturbance.   Respiratory:  Negative for cough (hiccups) and shortness of breath.    Cardiovascular:  Negative for chest pain and leg swelling.   Gastrointestinal:  Negative for abdominal pain, nausea and vomiting.   Genitourinary:  Negative for dysuria.   Musculoskeletal:  Negative for gait problem.   Skin:  Positive for rash.        Psoriasis   Neurological:  Negative for headaches.   Psychiatric/Behavioral:  Negative for agitation and confusion.      Objective:     Vital Signs (Most Recent):  Temp: 96.4 °F (35.8 °C) (08/07/23 0743)  Pulse: 89 (08/07/23 0743)  Resp: 18 (08/07/23 0743)  BP: (!) 115/59 (08/07/23 0743)  SpO2: 95 % (08/07/23 0743) Vital Signs (24h Range):  Temp:  [96.4 °F (35.8 °C)-98.7 °F (37.1 °C)] 96.4 °F (35.8 °C)  Pulse:  [72-96] 89  Resp:  [17-20] 18  SpO2:  [90 %-95 %] 95 %  BP: (113-131)/(59-70) 115/59     Weight: 92.3 kg (203 lb 7.8 oz)  Body mass index is 28.38 kg/m².    Intake/Output Summary (Last 24 hours) at 8/7/2023 0850  Last data filed at 8/7/2023 0400  Gross per 24 hour   Intake 240 ml   Output 125 ml   Net 115 ml         Physical Exam  Vitals and nursing note reviewed.   Constitutional:       General: He is not in acute distress.     Appearance: Normal appearance.   HENT:      Head: Normocephalic and atraumatic.   Eyes:      Extraocular Movements: Extraocular movements intact.      Pupils: Pupils are equal, round, and reactive to light.   Cardiovascular:      Rate and Rhythm: Normal rate and regular rhythm.    Pulmonary:      Effort: Pulmonary effort is normal.   Abdominal:      General: Abdomen is flat.      Comments: CATRACHITA drain in place with serous straw colored fluid   Musculoskeletal:      Cervical back: Normal range of motion and neck supple.   Skin:     General: Skin is warm and dry.      Findings: Rash present.      Comments: Generalized psoriasis   Neurological:      General: No focal deficit present.      Mental Status: He is alert and oriented to person, place, and time.   Psychiatric:         Mood and Affect: Mood normal.         Behavior: Behavior normal.             Significant Labs: All pertinent labs within the past 24 hours have been reviewed.    Significant Imaging: I have reviewed all pertinent imaging results/findings within the past 24 hours.      Assessment/Plan:      * Intra-abdominal abscess  Mr. Page is a 53 YOM with cirrhosis 2/2 to EtOH use and HCV, esophageal varices, GIB, HTN, hypothyroidism, MDD, hydrocephalous, cholelithiasis, who presented to OSH for abdominal pain and is coming to [facility] for IR evaluation of intra-thoracic hematoma and hepatology evaluation. Presented for abdominal pain which has now resolved. CT A/P with IV contrast done and shows upper abdominal fluid collection likely a hematoma, surrounds the liver, stomach, and spleen. Origin is not identified however may represent a variceal or hepatic hematoma. No active extravasation identified. Patient transferred for IR and hepatology evaluation. History of past varices ligation. Has 1.1 cm hypoattenuating lesion in left hepatic lobe.     CT triple phase: loculated ascites; no mass lesions of the liver identified, though arterial phase not assessed.  Ascites cultures: negative gram stain and aerobic culture.  WBC 8030.    PLAN:  IR consulted, appreciate recs   - IR drainage, monitor output and obtain repeat imaging when output ceases- plan for CT once output diminished   - follow up cultures   - ID consulted, appreciate recs   - on  "CTX and metronidazole    Altered mental state  Wife reports hallucinations and change in behavior since onset, pt states this occurred once 3 days ago while falling asleep. No known prior episodes. DDx includes hepatic encephalopathy versus delirium    Ammonia 67    PLAN:  - delirium precautions  - lactulose    Hypocalcemia    Recent Labs   Lab 08/07/23  0705   CALCIUM 7.7*       No results for input(s): "CAION" in the last 24 hours.      LUISA (iron deficiency anemia)  Transfuse hgb greater than 7      Hypokalemia  Repletion daily      Tongue lesion  Patient with tongue swelling that started day of admission. White and green lesions on exam, looks dry. Dysphagia with dry tongue otherwise able to swallow. No hoarseness    Improved with Nystatin mouthwash    - SLP consulted  - Nystatin QID    Leukocytosis  Patient with leukocytosis that was present at OSH at 17.9 and downtrending to 15.24. patient otherwise is hemodynamically stable. Low concern for sepsis at this time however high chance for decompensation given presence of hematoma    Unclear etiology consider infected hematoma vs stress    - continue to monitor     HTN (hypertension)  Patient normotensive with hematoma present. Given risk for possible hemorrhagic shock will hold home BP meds     - hold home amlodipine 5   - hold home HCTZ 25     Gallstones  Cholelithiasis without cystitis   Seen on imaging 8/2/23    - monitor ALP and bili    Hiccups  Patient with new hiccups for one week while at OSH. possibly 2/2 from loculated ascites causing irritation to phrenic nerve     Improved with Thorazine. Significantly better now.    PLAN:  - Thorazine PRN  -baclofen prn breakthrough    HCV (hepatitis C virus)  Per OSH records patient with history HCV          MDD (major depressive disorder)  - continue home sertraline 50 mg   - continue buspirone 7.5 mg BID    Cirrhosis    MELD 3.0: 14 at 8/7/2023  7:05 AM  MELD-Na: 10 at 8/7/2023  7:05 AM  Calculated from:  Serum " Creatinine: 0.6 mg/dL (Using min of 1 mg/dL) at 8/7/2023  7:05 AM  Serum Sodium: 134 mmol/L at 8/7/2023  7:05 AM  Total Bilirubin: 1.1 mg/dL at 8/7/2023  7:05 AM  Serum Albumin: 2.0 g/dL at 8/7/2023  7:05 AM  INR(ratio): 1.3 at 8/7/2023  7:05 AM  Age at listing (hypothetical): 53 years  Sex: Male at 8/7/2023  7:05 AM    - hepatology consulted, recs appreciated   - Referred to outpatient hepatologist  - patient on nadolol at OSH, started on propranolol this admission  - continue pantoprazole 40 mg from OSH       Hypothyroidism  - continue home synthroid 75 mcg    Normocytic anemia  Patient presents with anemia 12.1 mcv 87 (normocytic anemia 12.6 at OSH). No known baseline hemoglobin. Patient with new hematoma formation.     Iron panel: iron 26, TIBC 250, transferrin 169, Ferritin 213.       VTE Risk Mitigation (From admission, onward)         Ordered     IP VTE LOW RISK PATIENT  Once         08/03/23 0108     Place sequential compression device  Until discontinued         08/03/23 0108                Discharge Planning   DONIS: 8/7/2023     Code Status: Full Code   Is the patient medically ready for discharge?: No    Reason for patient still in hospital (select all that apply): Treatment  Discharge Plan A: Home with family                  Cristian Worley MD  Department of Hospital Medicine   [facility] - Surgery

## 2023-08-07 NOTE — PROGRESS NOTES
[facility] - [facility]  Infectious Disease  Progress Note    Patient Name: Gregg Page  MRN: [id number]  Admission Date: 8/3/2023  Length of Stay: 4 days  Attending Physician: Rosalind Borden MD  Primary Care Provider: No, Primary Doctor    Isolation Status: No active isolations  Assessment/Plan:      GI  * Intra-abdominal abscess  53M with cirrhosis 2/2 to EtOH use and HCV 2/2 IVDU presented for R-sided abdominal pain, found to have upper abdominal fluid collection w/ loculations around L hepatic lobe, possibly loculated ascites. Underwent IR drain placement, ongoing serous output in CATRACHITA. Fluid infected based on cell counts (WBC 8000 w/ 94% segs), cultures NGTD. On empiric ceftriaxone and flagyl.    Recommendations:  - unclear if this fluid is true abscess vs. Infected ascites  - repeat cell count from CATRACHITA drain fluid to assess treatment response  - will review imaging w/ IR  - continue current abx      Anticipated Disposition: TBD    Thank you for your consult. I will follow-up with patient. Please contact us if you have any additional questions.    Natasha Clarke DO  Transplant Infectious Disease    Time: 50 minutes   50% of time spent on face-to-face counseling and coordination of care. Counseling included review of test results, diagnosis, and treatment plan with patient and/or family.      Subjective:     Principal Problem:Intra-abdominal abscess    HPI: 53M with cirrhosis 2/2 to EtOH use and HCV 2/2 IVDU, esophageal varices, GIB, HTN, hypothyroidism, MDD, hydrocephalous, cholelithiasis, who presented to OSH for right sided abdominal pain. He has been seen in the ED multiple times since May 2023 for recurrent R-sided abdominal pain. RUQ US on 8/2 showed multiloculated hypoechoic echogenicity around L hepatic lobe. CT findings include an upper abdominal fluid collection without clear origin or etiology and possibly representing a hematoma without active extravasation, surrounding the liver, stomach, and spleen as well as  "1.1 cm hypoattenuating lesion of the left hepatic lobe and cholelithiasis. Ascitic fluid culture showing negative gram stain, negative aerobes, other cultures pending. On CTX + metronidazole. ID consulted for "loculated ascitis thought likely abscess with leukocytosis"    Interval History: no acute events, drain in place with serous drainage noted. No abd pain. Hoping for discharge.     Review of Systems   All other systems reviewed and are negative.    Objective:     Vital Signs (Most Recent):  Temp: 98.5 °F (36.9 °C) (08/07/23 1640)  Pulse: 81 (08/07/23 1640)  Resp: 17 (08/07/23 1640)  BP: (!) 144/77 (08/07/23 1640)  SpO2: 96 % (08/07/23 1640) Vital Signs (24h Range):  Temp:  [96.4 °F (35.8 °C)-98.7 °F (37.1 °C)] 98.5 °F (36.9 °C)  Pulse:  [77-96] 81  Resp:  [17-20] 17  SpO2:  [94 %-96 %] 96 %  BP: (115-144)/(59-77) 144/77     Weight: 92.3 kg (203 lb 7.8 oz)  Body mass index is 28.38 kg/m².    Estimated Creatinine Clearance: 165.3 mL/min (based on SCr of 0.6 mg/dL).     Physical Exam  Constitutional:       General: He is not in acute distress.     Appearance: Normal appearance. He is well-developed. He is not ill-appearing or diaphoretic.   HENT:      Head: Normocephalic and atraumatic.      Right Ear: External ear normal.      Left Ear: External ear normal.      Nose: Nose normal.   Eyes:      General: No scleral icterus.        Right eye: No discharge.         Left eye: No discharge.      Extraocular Movements: Extraocular movements intact.      Conjunctiva/sclera: Conjunctivae normal.   Pulmonary:      Effort: Pulmonary effort is normal. No respiratory distress.      Breath sounds: No stridor.   Abdominal:      General: Abdomen is flat.      Palpations: Abdomen is soft.      Comments: CATRACHITA drain RUQ with serous drainage   Skin:     General: Skin is dry.      Coloration: Skin is not jaundiced or pale.      Findings: No erythema.   Neurological:      General: No focal deficit present.      Mental Status: He is " alert and oriented to person, place, and time. Mental status is at baseline.   Psychiatric:         Mood and Affect: Mood normal.         Behavior: Behavior normal.         Thought Content: Thought content normal.         Judgment: Judgment normal.          Significant Labs: CBC:   Recent Labs   Lab 08/06/23  0503 08/07/23  0705   WBC 16.99* 15.23*   HGB 11.8* 11.9*   HCT 35.5* 35.5*   * 90*     CMP:   Recent Labs   Lab 08/06/23  0503 08/07/23  0705    134*   K 3.5 3.4*    103   CO2 23 21*   GLU 96 91   BUN 8 6   CREATININE 0.6 0.6   CALCIUM 7.9* 7.7*   PROT 5.1* 5.2*   ALBUMIN 2.1* 2.0*   BILITOT 1.0 1.1*   ALKPHOS 108 93   AST 33 31   ALT 16 14   ANIONGAP 11 10     Microbiology Results (last 7 days)       Procedure Component Value Units Date/Time    Blood culture [[id number]] Collected: 08/03/23 1422    Order Status: Completed Specimen: Blood Updated: 08/07/23 1612     Blood Culture, Routine No Growth to date      No Growth to date      No Growth to date      No Growth to date      No Growth to date    Blood culture [[id number]] Collected: 08/03/23 1424    Order Status: Completed Specimen: Blood Updated: 08/07/23 1612     Blood Culture, Routine No Growth to date      No Growth to date      No Growth to date      No Growth to date      No Growth to date    Aerobic culture [[id number]] Collected: 08/04/23 1409    Order Status: Completed Specimen: Abscess from Abdomen Updated: 08/07/23 0732     Aerobic Bacterial Culture No growth    Culture, Anaerobe [[id number]] Collected: 08/04/23 1409    Order Status: Completed Specimen: Abscess from Abdomen Updated: 08/05/23 1441     Anaerobic Culture Culture in progress    Gram stain [[id number]] Collected: 08/04/23 1409    Order Status: Completed Specimen: Abscess from Abdomen Updated: 08/04/23 1846     Gram Stain Result Rare WBC's      No organisms seen    Fungus culture [[id number]] Collected: 08/04/23 1409    Order Status: Sent Specimen: Abscess from Abdomen  Updated: 08/04/23 1511            Significant Imaging: I have reviewed all pertinent imaging results/findings within the past 24 hours.

## 2023-08-07 NOTE — ASSESSMENT & PLAN NOTE
Patient with new hiccups for one week while at OSH. possibly 2/2 from loculated ascites causing irritation to phrenic nerve     Improved with Thorazine. Significantly better now.    PLAN:  - Thorazine PRN  -baclofen prn breakthrough

## 2023-08-07 NOTE — ASSESSMENT & PLAN NOTE
"  Recent Labs   Lab 08/07/23  0705   CALCIUM 7.7*       No results for input(s): "CHERI" in the last 24 hours.    "

## 2023-08-07 NOTE — ASSESSMENT & PLAN NOTE
53M with cirrhosis 2/2 to EtOH use and HCV 2/2 IVDU presented for R-sided abdominal pain, found to have upper abdominal fluid collection w/ loculations around L hepatic lobe, possibly loculated ascites. Underwent IR drain placement, ongoing serous output in CATRACHITA. Fluid infected based on cell counts (WBC 8000 w/ 94% segs), cultures NGTD. On empiric ceftriaxone and flagyl.    Recommendations:  - unclear if this fluid is true abscess vs. Infected ascites  - repeat cell count from CATRACHITA drain fluid to assess treatment response  - will review imaging w/ IR

## 2023-08-08 LAB
ALBUMIN SERPL BCP-MCNC: 2.1 G/DL (ref 3.5–5.2)
ALP SERPL-CCNC: 111 U/L (ref 55–135)
ALT SERPL W/O P-5'-P-CCNC: 14 U/L (ref 10–44)
ANION GAP SERPL CALC-SCNC: 12 MMOL/L (ref 8–16)
ANISOCYTOSIS BLD QL SMEAR: SLIGHT
AST SERPL-CCNC: 29 U/L (ref 10–40)
BACTERIA BLD CULT: NORMAL
BACTERIA BLD CULT: NORMAL
BASOPHILS # BLD AUTO: 0.07 K/UL (ref 0–0.2)
BASOPHILS NFR BLD: 0.4 % (ref 0–1.9)
BILIRUB SERPL-MCNC: 0.8 MG/DL (ref 0.1–1)
BUN SERPL-MCNC: 5 MG/DL (ref 6–20)
BURR CELLS BLD QL SMEAR: ABNORMAL
CALCIUM SERPL-MCNC: 7.7 MG/DL (ref 8.7–10.5)
CHLORIDE SERPL-SCNC: 102 MMOL/L (ref 95–110)
CO2 SERPL-SCNC: 22 MMOL/L (ref 23–29)
CREAT SERPL-MCNC: 0.6 MG/DL (ref 0.5–1.4)
DIFFERENTIAL METHOD: ABNORMAL
EOSINOPHIL # BLD AUTO: 0.3 K/UL (ref 0–0.5)
EOSINOPHIL NFR BLD: 1.4 % (ref 0–8)
ERYTHROCYTE [DISTWIDTH] IN BLOOD BY AUTOMATED COUNT: 15.4 % (ref 11.5–14.5)
EST. GFR  (NO RACE VARIABLE): >60 ML/MIN/1.73 M^2
GLUCOSE SERPL-MCNC: 69 MG/DL (ref 70–110)
HCT VFR BLD AUTO: 36.5 % (ref 40–54)
HGB BLD-MCNC: 12 G/DL (ref 14–18)
HYPOCHROMIA BLD QL SMEAR: ABNORMAL
IMM GRANULOCYTES # BLD AUTO: 0.22 K/UL (ref 0–0.04)
IMM GRANULOCYTES NFR BLD AUTO: 1.3 % (ref 0–0.5)
INR PPP: 1.3 (ref 0.8–1.2)
LYMPHOCYTES # BLD AUTO: 1.5 K/UL (ref 1–4.8)
LYMPHOCYTES NFR BLD: 8.8 % (ref 18–48)
MAGNESIUM SERPL-MCNC: 1.6 MG/DL (ref 1.6–2.6)
MCH RBC QN AUTO: 28.8 PG (ref 27–31)
MCHC RBC AUTO-ENTMCNC: 32.9 G/DL (ref 32–36)
MCV RBC AUTO: 88 FL (ref 82–98)
MONOCYTES # BLD AUTO: 2.2 K/UL (ref 0.3–1)
MONOCYTES NFR BLD: 12.8 % (ref 4–15)
NEUTROPHILS # BLD AUTO: 13.1 K/UL (ref 1.8–7.7)
NEUTROPHILS NFR BLD: 75.3 % (ref 38–73)
NRBC BLD-RTO: 0 /100 WBC
OVALOCYTES BLD QL SMEAR: ABNORMAL
PHOSPHATE SERPL-MCNC: 2.6 MG/DL (ref 2.7–4.5)
PLATELET # BLD AUTO: 94 K/UL (ref 150–450)
PMV BLD AUTO: 11 FL (ref 9.2–12.9)
POIKILOCYTOSIS BLD QL SMEAR: SLIGHT
POLYCHROMASIA BLD QL SMEAR: ABNORMAL
POTASSIUM SERPL-SCNC: 3.3 MMOL/L (ref 3.5–5.1)
PROT SERPL-MCNC: 5.4 G/DL (ref 6–8.4)
PROTHROMBIN TIME: 14.1 SEC (ref 9–12.5)
RBC # BLD AUTO: 4.17 M/UL (ref 4.6–6.2)
SODIUM SERPL-SCNC: 136 MMOL/L (ref 136–145)
SPHEROCYTES BLD QL SMEAR: ABNORMAL
TOXIC GRANULES BLD QL SMEAR: PRESENT
WBC # BLD AUTO: 17.38 K/UL (ref 3.9–12.7)

## 2023-08-08 PROCEDURE — 99233 PR SUBSEQUENT HOSPITAL CARE,LEVL III: ICD-10-PCS | Mod: ,,, | Performed by: HOSPITALIST

## 2023-08-08 PROCEDURE — 99233 PR SUBSEQUENT HOSPITAL CARE,LEVL III: ICD-10-PCS | Mod: ,,, | Performed by: INTERNAL MEDICINE

## 2023-08-08 PROCEDURE — 11000001 HC ACUTE MED/SURG PRIVATE ROOM

## 2023-08-08 PROCEDURE — 25500020 PHARM REV CODE 255: Performed by: HOSPITALIST

## 2023-08-08 PROCEDURE — 25000003 PHARM REV CODE 250: Performed by: STUDENT IN AN ORGANIZED HEALTH CARE EDUCATION/TRAINING PROGRAM

## 2023-08-08 PROCEDURE — 99233 SBSQ HOSP IP/OBS HIGH 50: CPT | Mod: ,,, | Performed by: INTERNAL MEDICINE

## 2023-08-08 PROCEDURE — 85610 PROTHROMBIN TIME: CPT | Performed by: STUDENT IN AN ORGANIZED HEALTH CARE EDUCATION/TRAINING PROGRAM

## 2023-08-08 PROCEDURE — 85025 COMPLETE CBC W/AUTO DIFF WBC: CPT

## 2023-08-08 PROCEDURE — 99233 SBSQ HOSP IP/OBS HIGH 50: CPT | Mod: ,,, | Performed by: HOSPITALIST

## 2023-08-08 PROCEDURE — 84100 ASSAY OF PHOSPHORUS: CPT

## 2023-08-08 PROCEDURE — 63600175 PHARM REV CODE 636 W HCPCS: Performed by: STUDENT IN AN ORGANIZED HEALTH CARE EDUCATION/TRAINING PROGRAM

## 2023-08-08 PROCEDURE — 36415 COLL VENOUS BLD VENIPUNCTURE: CPT | Performed by: STUDENT IN AN ORGANIZED HEALTH CARE EDUCATION/TRAINING PROGRAM

## 2023-08-08 PROCEDURE — A4216 STERILE WATER/SALINE, 10 ML: HCPCS | Performed by: STUDENT IN AN ORGANIZED HEALTH CARE EDUCATION/TRAINING PROGRAM

## 2023-08-08 PROCEDURE — 83735 ASSAY OF MAGNESIUM: CPT

## 2023-08-08 PROCEDURE — 80053 COMPREHEN METABOLIC PANEL: CPT

## 2023-08-08 PROCEDURE — 25000003 PHARM REV CODE 250

## 2023-08-08 RX ORDER — SODIUM CHLORIDE 0.9 % (FLUSH) 0.9 %
10 SYRINGE (ML) INJECTION 2 TIMES DAILY
Status: DISCONTINUED | OUTPATIENT
Start: 2023-08-08 | End: 2023-08-17 | Stop reason: HOSPADM

## 2023-08-08 RX ORDER — LACTULOSE 10 G/15ML
10 SOLUTION ORAL 2 TIMES DAILY
Status: DISCONTINUED | OUTPATIENT
Start: 2023-08-08 | End: 2023-08-10

## 2023-08-08 RX ADMIN — PROPRANOLOL HYDROCHLORIDE 10 MG: 10 TABLET ORAL at 09:08

## 2023-08-08 RX ADMIN — IOHEXOL 100 ML: 350 INJECTION, SOLUTION INTRAVENOUS at 01:08

## 2023-08-08 RX ADMIN — NYSTATIN 500000 UNITS: 100000 SUSPENSION ORAL at 05:08

## 2023-08-08 RX ADMIN — PIPERACILLIN SODIUM AND TAZOBACTAM SODIUM 4.5 G: 4; .5 INJECTION, POWDER, FOR SOLUTION INTRAVENOUS at 06:08

## 2023-08-08 RX ADMIN — LACTULOSE 10 G: 20 SOLUTION ORAL at 09:08

## 2023-08-08 RX ADMIN — PANTOPRAZOLE SODIUM 40 MG: 40 TABLET, DELAYED RELEASE ORAL at 09:08

## 2023-08-08 RX ADMIN — METRONIDAZOLE 500 MG: 500 INJECTION, SOLUTION INTRAVENOUS at 02:08

## 2023-08-08 RX ADMIN — NYSTATIN 500000 UNITS: 100000 SUSPENSION ORAL at 12:08

## 2023-08-08 RX ADMIN — POTASSIUM BICARBONATE 40 MEQ: 391 TABLET, EFFERVESCENT ORAL at 08:08

## 2023-08-08 RX ADMIN — BUSPIRONE HYDROCHLORIDE 7.5 MG: 5 TABLET ORAL at 08:08

## 2023-08-08 RX ADMIN — CEFTRIAXONE 2 G: 2 INJECTION, POWDER, FOR SOLUTION INTRAMUSCULAR; INTRAVENOUS at 12:08

## 2023-08-08 RX ADMIN — PROPRANOLOL HYDROCHLORIDE 10 MG: 10 TABLET ORAL at 08:08

## 2023-08-08 RX ADMIN — NYSTATIN 500000 UNITS: 100000 SUSPENSION ORAL at 08:08

## 2023-08-08 RX ADMIN — LACTULOSE 10 G: 20 SOLUTION ORAL at 08:08

## 2023-08-08 RX ADMIN — BUSPIRONE HYDROCHLORIDE 7.5 MG: 5 TABLET ORAL at 09:08

## 2023-08-08 RX ADMIN — SERTRALINE HYDROCHLORIDE 25 MG: 25 TABLET ORAL at 09:08

## 2023-08-08 RX ADMIN — Medication 10 ML: at 08:08

## 2023-08-08 RX ADMIN — METRONIDAZOLE 500 MG: 500 INJECTION, SOLUTION INTRAVENOUS at 05:08

## 2023-08-08 RX ADMIN — POTASSIUM BICARBONATE 40 MEQ: 391 TABLET, EFFERVESCENT ORAL at 10:08

## 2023-08-08 RX ADMIN — LEVOTHYROXINE SODIUM 75 MCG: 75 TABLET ORAL at 05:08

## 2023-08-08 NOTE — PROGRESS NOTES
[facility] Hospital Medicine  Progress Note    Patient Name: Gregg Page  MRN: [id number]  Patient Class: IP- Inpatient   Admission Date: 8/3/2023  Length of Stay: 5 days  Attending Physician: Rosalind Borden MD  Primary Care Provider: Katya, Primary Doctor        Subjective:     Principal Problem:Intra-abdominal abscess        HPI:  Mr. Page is a 53 YOM with cirrhosis 2/2 to EtOH use and HCV, esophageal varices, GIB, HTN, hypothyroidism, MDD, hydrocephalous, cholelithiasis, who presented to OSH for abdominal pain and [place] [facility] [place] of intra-thoracic hematoma and hepatology evaluation. Of note, patient was hospitalized 6/23 with similar presentation had MRCP done at that time (unavailable for review).  Abdominal pain radiates to the chest, onset 6 days prior to presentation at OSH. Patient denies melena, BRBPR, continued abdominal pain or CP. Patient states morning of transfer he started having tongue swelling and pain, feels dry, remitted with water, has dysphagia when his tongue is dry only, pain described as burning and located throughout tongue. No recent procedures/liver biopsy done. Patient also states for the past 1 week has had intractable hiccups which cause anxiety.     Patient hemodynamically stable. Labs concerning for new leukocytosis 17.9, normocytic anemia 12.6 ammonia 50, low Hct 32.2, elevated PT 16.6, INR 1.31, Troponin, lipase, U/A, BNP, CXR wnl    CT A/P with IV contrast done and shows upper abdominal fluid collection likely a hematoma, surrounds the liver, stomach, and spleen. Origin is not identified however may represent a variceal or hepatic hematoma. No active extravasation identified. Collection measures greater than 21.7 cm in transverse diameter and largest single pocket measures approx 7.5cm in maximal AP dimension.      Overview/Hospital Course:  Procal 0.59; blood cultures show NGTD. CT triple phase shows loculated ascites and no visualized liver lesion. IR  drainage of presumed intra-abdominal abscess 8/4/2023; pending cxs. Cytology reveals elevated WBC at 8030; aerobic cx negative, gram stain negative. ID consulted and recommended repeating cell count with continued abx. CT obtained, pending results and IR recs.      Interval History: NAEO.    Review of Systems   Constitutional:  Negative for chills and fever.   Respiratory:  Negative for cough and shortness of breath.    Cardiovascular:  Negative for chest pain.   Gastrointestinal:  Positive for abdominal pain and diarrhea. Negative for nausea and vomiting.   Musculoskeletal:  Negative for gait problem.   Skin:  Positive for rash.     Objective:     Vital Signs (Most Recent):  Temp: 96.7 °F (35.9 °C) (08/08/23 1122)  Pulse: 82 (08/08/23 1122)  Resp: 17 (08/08/23 1122)  BP: 128/73 (08/08/23 1122)  SpO2: 95 % (08/08/23 1122) Vital Signs (24h Range):  Temp:  [96.7 °F (35.9 °C)-99.4 °F (37.4 °C)] 96.7 °F (35.9 °C)  Pulse:  [81-92] 82  Resp:  [16-18] 17  SpO2:  [93 %-97 %] 95 %  BP: (117-144)/(56-77) 128/73     Weight: 92.3 kg (203 lb 7.8 oz)  Body mass index is 28.38 kg/m².    Intake/Output Summary (Last 24 hours) at 8/8/2023 1148  Last data filed at 8/8/2023 0532  Gross per 24 hour   Intake --   Output 25 ml   Net -25 ml         Physical Exam  Vitals and nursing note reviewed.   Constitutional:       General: He is not in acute distress.     Appearance: Normal appearance. He is not toxic-appearing.   HENT:      Head: Normocephalic and atraumatic.   Eyes:      Extraocular Movements: Extraocular movements intact.      Pupils: Pupils are equal, round, and reactive to light.   Cardiovascular:      Rate and Rhythm: Normal rate and regular rhythm.   Pulmonary:      Effort: Pulmonary effort is normal. No respiratory distress.   Abdominal:      General: There is no distension.      Comments: CATRACHITA drain in place, draining yellow fluid.   Musculoskeletal:         General: No deformity.      Cervical back: Normal range of motion and  neck supple.   Skin:     General: Skin is dry.      Findings: Rash present.   Neurological:      Mental Status: He is alert and oriented to person, place, and time.      Gait: Gait normal.   Psychiatric:         Mood and Affect: Mood normal.         Behavior: Behavior normal.             Significant Labs: All pertinent labs within the past 24 hours have been reviewed.    Significant Imaging: I have reviewed all pertinent imaging results/findings within the past 24 hours.      Assessment/Plan:      * Intra-abdominal abscess  Mr. Page is a 53 YOM with cirrhosis 2/2 to EtOH use and HCV, esophageal varices, GIB, HTN, hypothyroidism, MDD, hydrocephalous, cholelithiasis, who presented to OSH for abdominal pain and is coming to [facility] for IR evaluation of intra-thoracic hematoma and hepatology evaluation. Presented for abdominal pain which has now resolved. CT A/P with IV contrast done and shows upper abdominal fluid collection likely a hematoma, surrounds the liver, stomach, and spleen. Origin is not identified however may represent a variceal or hepatic hematoma. No active extravasation identified. Patient transferred for IR and hepatology evaluation. History of past varices ligation. Has 1.1 cm hypoattenuating lesion in left hepatic lobe.     CT triple phase: loculated ascites; no mass lesions of the liver identified, though arterial phase not assessed.  Ascites cultures: negative gram stain and aerobic culture.  WBC 8030 > 45428.    PLAN:  IR consulted, appreciate recs   - IR drainage, monitor output and reassess repeat CT    - follow up cultures   - ID consulted, appreciate recs   - on CTX and metronidazole    Cirrhosis    MELD 3.0: 12 at 8/8/2023  4:02 AM  MELD-Na: 9 at 8/8/2023  4:02 AM  Calculated from:  Serum Creatinine: 0.6 mg/dL (Using min of 1 mg/dL) at 8/8/2023  4:02 AM  Serum Sodium: 136 mmol/L at 8/8/2023  4:02 AM  Total Bilirubin: 0.8 mg/dL (Using min of 1 mg/dL) at 8/8/2023  4:02 AM  Serum Albumin: 2.1 g/dL  "at 8/8/2023  4:02 AM  INR(ratio): 1.3 at 8/8/2023  4:02 AM  Age at listing (hypothetical): 53 years  Sex: Male at 8/8/2023  4:02 AM    - hepatology consulted, recs appreciated   - Referred to outpatient hepatologist  - patient on nadolol at OSH, started on propranolol this admission  - continue pantoprazole 40 mg from OSH     Altered mental state  Wife reports hallucinations and change in behavior since onset, pt states this occurred once 3 days ago while falling asleep. No known prior episodes. DDx includes hepatic encephalopathy versus delirium    Ammonia 67    PLAN:  - delirium precautions  - lactulose    Normocytic anemia  Patient presents with anemia 12.1 mcv 87 (normocytic anemia 12.6 at OSH). No known baseline hemoglobin. Patient with new hematoma formation.     Iron panel: iron 26, TIBC 250, transferrin 169, Ferritin 213.     Hiccups  Patient with new hiccups for one week while at OSH. possibly 2/2 from loculated ascites causing irritation to phrenic nerve     Improved with Thorazine. Significantly better now.    PLAN:  - Thorazine PRN  -baclofen prn breakthrough    Tongue lesion  Patient with tongue swelling that started day of admission. White and green lesions on exam, looks dry. Dysphagia with dry tongue otherwise able to swallow. No hoarseness    Improved with Nystatin mouthwash    - SLP consulted  - Nystatin QID    Hypocalcemia    Recent Labs   Lab 08/07/23  0705   CALCIUM 7.7*       No results for input(s): "CAION" in the last 24 hours.      LUISA (iron deficiency anemia)  Transfuse hgb greater than 7      Hypokalemia  Repletion daily      Leukocytosis  Patient with leukocytosis that was present at OSH at 17.9 and downtrending to 15.24. patient otherwise is hemodynamically stable. Low concern for sepsis at this time however high chance for decompensation given presence of hematoma    Unclear etiology consider infected hematoma vs stress    - continue to monitor     HTN (hypertension)  Patient " normotensive with hematoma present. Given risk for possible hemorrhagic shock will hold home BP meds     - hold home amlodipine 5   - hold home HCTZ 25     Gallstones  Cholelithiasis without cystitis   Seen on imaging 8/2/23    - monitor ALP and bili    HCV (hepatitis C virus)  Per OSH records patient with history HCV          MDD (major depressive disorder)  - continue home sertraline 50 mg   - continue buspirone 7.5 mg BID    Hypothyroidism  - continue home synthroid 75 mcg      VTE Risk Mitigation (From admission, onward)         Ordered     enoxaparin injection 40 mg  Daily         08/07/23 1005     IP VTE LOW RISK PATIENT  Once         08/07/23 1005     Place sequential compression device  Until discontinued         08/03/23 0108                Discharge Planning   DONIS: 8/10/2023     Code Status: Full Code   Is the patient medically ready for discharge?: No    Reason for patient still in hospital (select all that apply): Imaging and Consult recommendations  Discharge Plan A: Home with family                  Ailyn Duong DO  Department of Hospital Medicine   [facility] - Surgery

## 2023-08-08 NOTE — SUBJECTIVE & OBJECTIVE
Interval History: NAEO.    Review of Systems   Constitutional:  Negative for chills and fever.   Respiratory:  Negative for cough and shortness of breath.    Cardiovascular:  Negative for chest pain.   Gastrointestinal:  Positive for abdominal pain and diarrhea. Negative for nausea and vomiting.   Musculoskeletal:  Negative for gait problem.   Skin:  Positive for rash.     Objective:     Vital Signs (Most Recent):  Temp: 96.7 °F (35.9 °C) (08/08/23 1122)  Pulse: 82 (08/08/23 1122)  Resp: 17 (08/08/23 1122)  BP: 128/73 (08/08/23 1122)  SpO2: 95 % (08/08/23 1122) Vital Signs (24h Range):  Temp:  [96.7 °F (35.9 °C)-99.4 °F (37.4 °C)] 96.7 °F (35.9 °C)  Pulse:  [81-92] 82  Resp:  [16-18] 17  SpO2:  [93 %-97 %] 95 %  BP: (117-144)/(56-77) 128/73     Weight: 92.3 kg (203 lb 7.8 oz)  Body mass index is 28.38 kg/m².    Intake/Output Summary (Last 24 hours) at 8/8/2023 1148  Last data filed at 8/8/2023 0532  Gross per 24 hour   Intake --   Output 25 ml   Net -25 ml         Physical Exam  Vitals and nursing note reviewed.   Constitutional:       General: He is not in acute distress.     Appearance: Normal appearance. He is not toxic-appearing.   HENT:      Head: Normocephalic and atraumatic.   Eyes:      Extraocular Movements: Extraocular movements intact.      Pupils: Pupils are equal, round, and reactive to light.   Cardiovascular:      Rate and Rhythm: Normal rate and regular rhythm.   Pulmonary:      Effort: Pulmonary effort is normal. No respiratory distress.   Abdominal:      General: There is no distension.      Comments: CATRACHITA drain in place, draining yellow fluid.   Musculoskeletal:         General: No deformity.      Cervical back: Normal range of motion and neck supple.   Skin:     General: Skin is dry.      Findings: Rash present.   Neurological:      Mental Status: He is alert and oriented to person, place, and time.      Gait: Gait normal.   Psychiatric:         Mood and Affect: Mood normal.         Behavior:  Behavior normal.             Significant Labs: All pertinent labs within the past 24 hours have been reviewed.    Significant Imaging: I have reviewed all pertinent imaging results/findings within the past 24 hours.

## 2023-08-08 NOTE — H&P
Radiology History & Physical      SUBJECTIVE:     Chief Complaint: perihepatic fluid collection    History of Present Illness:  Gregg Page is a 53 y.o. male who presents for perihepatic collection drain exchange/upsize. Please see consult note from same date for further details.    No past medical history on file.  No past surgical history on file.    Home Meds:   Prior to Admission medications    Medication Sig Start Date End Date Taking? Authorizing Provider   acetaminophen (TYLENOL) 325 MG tablet Take 650 mg by mouth 2 (two) times daily as needed for Pain.   Yes Provider, Historical   busPIRone (BUSPAR) 15 MG tablet Take 7.5 mg by mouth 2 (two) times daily.   Yes Provider, Historical   hydroCHLOROthiazide (HYDRODIURIL) 25 MG tablet Take 25 mg by mouth 2 (two) times daily.   Yes Provider, Historical   levothyroxine (SYNTHROID) 75 MCG tablet Take 75 mcg by mouth before breakfast.   Yes Provider, Historical   nadoloL (CORGARD) 20 MG tablet Take 20 mg by mouth once daily.   Yes Provider, Historical   pantoprazole (PROTONIX) 40 MG tablet Take 40 mg by mouth once daily.   Yes Provider, Historical   sertraline (ZOLOFT) 50 MG tablet Take 50 mg by mouth once daily.   Yes Provider, Historical     Anticoagulants/Antiplatelets:  reviewed    Allergies: Review of patient's allergies indicates:  No Known Allergies  Sedation History:  no adverse reactions    Review of Systems:   Hematological: no known coagulopathies  Respiratory: no shortness of breath  Cardiovascular: no chest pain  Gastrointestinal: no abdominal pain  Genito-Urinary: no dysuria  Musculoskeletal: negative  Neurological: no TIA or stroke symptoms         OBJECTIVE:     Vital Signs (Most Recent)  Temp: 96.3 °F (35.7 °C) (08/08/23 1531)  Pulse: 84 (08/08/23 1531)  Resp: 17 (08/08/23 1531)  BP: 123/77 (08/08/23 1531)  SpO2: 96 % (08/08/23 1531)    Physical Exam:  ASA: II  Mallampati: II    General: no acute distress  Mental Status: alert and oriented to person,  place and time  HEENT: normocephalic, atraumatic  Chest: unlabored breathing  Heart: regular heart rate  Abdomen: nondistended  Extremity: moves all extremities    Laboratory  Lab Results   Component Value Date    INR 1.3 (H) 08/08/2023       Lab Results   Component Value Date    WBC 17.38 (H) 08/08/2023    HGB 12.0 (L) 08/08/2023    HCT 36.5 (L) 08/08/2023    MCV 88 08/08/2023    PLT 94 (L) 08/08/2023      Lab Results   Component Value Date    GLU 69 (L) 08/08/2023     08/08/2023    K 3.3 (L) 08/08/2023     08/08/2023    CO2 22 (L) 08/08/2023    BUN 5 (L) 08/08/2023    CREATININE 0.6 08/08/2023    CALCIUM 7.7 (L) 08/08/2023    MG 1.6 08/08/2023    ALT 14 08/08/2023    AST 29 08/08/2023    ALBUMIN 2.1 (L) 08/08/2023    BILITOT 0.8 08/08/2023       ASSESSMENT/PLAN:     Sedation Plan: up to moderate  Patient will undergo perihepatic fluid collection drain exchange/upsize.    Please place patient NPO at midnight.    Niranjan Page MD PGY-3  Department of Radiology  [facility]

## 2023-08-08 NOTE — ASSESSMENT & PLAN NOTE
Mr. Page is a 53 YOM with cirrhosis 2/2 to EtOH use and HCV, esophageal varices, GIB, HTN, hypothyroidism, MDD, hydrocephalous, cholelithiasis, who presented to OSH for abdominal pain and is coming to [facility] for IR evaluation of intra-thoracic hematoma and hepatology evaluation. Presented for abdominal pain which has now resolved. CT A/P with IV contrast done and shows upper abdominal fluid collection likely a hematoma, surrounds the liver, stomach, and spleen. Origin is not identified however may represent a variceal or hepatic hematoma. No active extravasation identified. Patient transferred for IR and hepatology evaluation. History of past varices ligation. Has 1.1 cm hypoattenuating lesion in left hepatic lobe.     CT triple phase: loculated ascites; no mass lesions of the liver identified, though arterial phase not assessed.  Ascites cultures: negative gram stain and aerobic culture.  WBC 8030 > 28631.    PLAN:  IR consulted, appreciate recs   - IR drainage, monitor output and reassess repeat CT    - follow up cultures   - ID consulted, appreciate recs   - on CTX and metronidazole

## 2023-08-08 NOTE — ASSESSMENT & PLAN NOTE
MELD 3.0: 12 at 8/8/2023  4:02 AM  MELD-Na: 9 at 8/8/2023  4:02 AM  Calculated from:  Serum Creatinine: 0.6 mg/dL (Using min of 1 mg/dL) at 8/8/2023  4:02 AM  Serum Sodium: 136 mmol/L at 8/8/2023  4:02 AM  Total Bilirubin: 0.8 mg/dL (Using min of 1 mg/dL) at 8/8/2023  4:02 AM  Serum Albumin: 2.1 g/dL at 8/8/2023  4:02 AM  INR(ratio): 1.3 at 8/8/2023  4:02 AM  Age at listing (hypothetical): 53 years  Sex: Male at 8/8/2023  4:02 AM    - hepatology consulted, recs appreciated   - Referred to outpatient hepatologist  - patient on nadolol at OSH, started on propranolol this admission  - continue pantoprazole 40 mg from OSH

## 2023-08-08 NOTE — CONSULTS
52 yo male with cirrhosis and recently discovered loculated perihepatic fluid collection s/p IR drain placement 8/4. Repeat CT obtained today (8/8) reveals drain within appropriate position with collection minimally decreased in size. IR re-consulted for drain exchange/upsize, tentatively planning for tomorrow (8/9) with moderate sedation. Please place NPO at midnight.    Full H&P to follow.    Niranjan Page MD PGY-3  Department of Radiology  [facility]

## 2023-08-09 PROBLEM — R41.82 ALTERED MENTAL STATE: Status: RESOLVED | Noted: 2023-08-04 | Resolved: 2023-08-09

## 2023-08-09 LAB
ALBUMIN FLD-MCNC: <0.4 G/DL
ALBUMIN SERPL BCP-MCNC: 2.2 G/DL (ref 3.5–5.2)
ALP SERPL-CCNC: 101 U/L (ref 55–135)
ALT SERPL W/O P-5'-P-CCNC: 13 U/L (ref 10–44)
AMYLASE, BODY FLUID: <5 U/L
ANION GAP SERPL CALC-SCNC: 10 MMOL/L (ref 8–16)
ANISOCYTOSIS BLD QL SMEAR: SLIGHT
APPEARANCE FLD: NORMAL
AST SERPL-CCNC: 25 U/L (ref 10–40)
BASOPHILS # BLD AUTO: 0.07 K/UL (ref 0–0.2)
BASOPHILS NFR BLD: 0.4 % (ref 0–1.9)
BILIRUB SERPL-MCNC: 0.9 MG/DL (ref 0.1–1)
BODY FLD TYPE: NORMAL
BODY FLUID SOURCE AMYLASE: NORMAL
BODY FLUID SOURCE, LDH: NORMAL
BUN SERPL-MCNC: 3 MG/DL (ref 6–20)
CALCIUM SERPL-MCNC: 7.8 MG/DL (ref 8.7–10.5)
CHLORIDE SERPL-SCNC: 104 MMOL/L (ref 95–110)
CO2 SERPL-SCNC: 22 MMOL/L (ref 23–29)
COLOR FLD: NORMAL
CREAT SERPL-MCNC: 0.6 MG/DL (ref 0.5–1.4)
DIFFERENTIAL METHOD: ABNORMAL
EOSINOPHIL # BLD AUTO: 0.3 K/UL (ref 0–0.5)
EOSINOPHIL NFR BLD: 1.8 % (ref 0–8)
ERYTHROCYTE [DISTWIDTH] IN BLOOD BY AUTOMATED COUNT: 15.7 % (ref 11.5–14.5)
EST. GFR  (NO RACE VARIABLE): >60 ML/MIN/1.73 M^2
GLUCOSE SERPL-MCNC: 88 MG/DL (ref 70–110)
HCT VFR BLD AUTO: 36.9 % (ref 40–54)
HGB BLD-MCNC: 11.9 G/DL (ref 14–18)
HYPOCHROMIA BLD QL SMEAR: ABNORMAL
IMM GRANULOCYTES # BLD AUTO: 0.13 K/UL (ref 0–0.04)
IMM GRANULOCYTES NFR BLD AUTO: 0.8 % (ref 0–0.5)
INR PPP: 1.4 (ref 0.8–1.2)
LDH FLD L TO P-CCNC: 333 U/L
LYMPHOCYTES # BLD AUTO: 1.6 K/UL (ref 1–4.8)
LYMPHOCYTES NFR BLD: 9.4 % (ref 18–48)
LYMPHOCYTES NFR FLD MANUAL: 5 %
MAGNESIUM SERPL-MCNC: 1.8 MG/DL (ref 1.6–2.6)
MCH RBC QN AUTO: 28.7 PG (ref 27–31)
MCHC RBC AUTO-ENTMCNC: 32.2 G/DL (ref 32–36)
MCV RBC AUTO: 89 FL (ref 82–98)
MONOCYTES # BLD AUTO: 2.2 K/UL (ref 0.3–1)
MONOCYTES NFR BLD: 13.6 % (ref 4–15)
MONOS+MACROS NFR FLD MANUAL: 5 %
NEUTROPHILS # BLD AUTO: 12.2 K/UL (ref 1.8–7.7)
NEUTROPHILS NFR BLD: 74 % (ref 38–73)
NEUTROPHILS NFR FLD MANUAL: 90 %
NRBC BLD-RTO: 0 /100 WBC
PHOSPHATE SERPL-MCNC: 3.2 MG/DL (ref 2.7–4.5)
PLATELET # BLD AUTO: 108 K/UL (ref 150–450)
PLATELET BLD QL SMEAR: ABNORMAL
PMV BLD AUTO: 11.7 FL (ref 9.2–12.9)
POIKILOCYTOSIS BLD QL SMEAR: SLIGHT
POTASSIUM SERPL-SCNC: 3.6 MMOL/L (ref 3.5–5.1)
PROT SERPL-MCNC: 5.5 G/DL (ref 6–8.4)
PROTHROMBIN TIME: 14.4 SEC (ref 9–12.5)
RBC # BLD AUTO: 4.14 M/UL (ref 4.6–6.2)
SODIUM SERPL-SCNC: 136 MMOL/L (ref 136–145)
SPECIMEN SOURCE: NORMAL
WBC # BLD AUTO: 16.47 K/UL (ref 3.9–12.7)
WBC # FLD: 1956 /CU MM

## 2023-08-09 PROCEDURE — 83735 ASSAY OF MAGNESIUM: CPT

## 2023-08-09 PROCEDURE — 80053 COMPREHEN METABOLIC PANEL: CPT

## 2023-08-09 PROCEDURE — 85610 PROTHROMBIN TIME: CPT | Performed by: STUDENT IN AN ORGANIZED HEALTH CARE EDUCATION/TRAINING PROGRAM

## 2023-08-09 PROCEDURE — 99233 PR SUBSEQUENT HOSPITAL CARE,LEVL III: ICD-10-PCS | Mod: ,,, | Performed by: INTERNAL MEDICINE

## 2023-08-09 PROCEDURE — 99233 SBSQ HOSP IP/OBS HIGH 50: CPT | Mod: ,,, | Performed by: HOSPITALIST

## 2023-08-09 PROCEDURE — 99233 SBSQ HOSP IP/OBS HIGH 50: CPT | Mod: ,,, | Performed by: INTERNAL MEDICINE

## 2023-08-09 PROCEDURE — 36415 COLL VENOUS BLD VENIPUNCTURE: CPT | Performed by: STUDENT IN AN ORGANIZED HEALTH CARE EDUCATION/TRAINING PROGRAM

## 2023-08-09 PROCEDURE — 87075 CULTR BACTERIA EXCEPT BLOOD: CPT | Performed by: STUDENT IN AN ORGANIZED HEALTH CARE EDUCATION/TRAINING PROGRAM

## 2023-08-09 PROCEDURE — 25000003 PHARM REV CODE 250

## 2023-08-09 PROCEDURE — 85025 COMPLETE CBC W/AUTO DIFF WBC: CPT

## 2023-08-09 PROCEDURE — 87070 CULTURE OTHR SPECIMN AEROBIC: CPT | Performed by: STUDENT IN AN ORGANIZED HEALTH CARE EDUCATION/TRAINING PROGRAM

## 2023-08-09 PROCEDURE — 25000003 PHARM REV CODE 250: Performed by: STUDENT IN AN ORGANIZED HEALTH CARE EDUCATION/TRAINING PROGRAM

## 2023-08-09 PROCEDURE — 21400001 HC TELEMETRY ROOM

## 2023-08-09 PROCEDURE — 63600175 PHARM REV CODE 636 W HCPCS: Performed by: STUDENT IN AN ORGANIZED HEALTH CARE EDUCATION/TRAINING PROGRAM

## 2023-08-09 PROCEDURE — A4216 STERILE WATER/SALINE, 10 ML: HCPCS | Performed by: STUDENT IN AN ORGANIZED HEALTH CARE EDUCATION/TRAINING PROGRAM

## 2023-08-09 PROCEDURE — 89051 BODY FLUID CELL COUNT: CPT | Performed by: STUDENT IN AN ORGANIZED HEALTH CARE EDUCATION/TRAINING PROGRAM

## 2023-08-09 PROCEDURE — 83690 ASSAY OF LIPASE: CPT | Performed by: HOSPITALIST

## 2023-08-09 PROCEDURE — 25500020 PHARM REV CODE 255: Performed by: HOSPITALIST

## 2023-08-09 PROCEDURE — 84100 ASSAY OF PHOSPHORUS: CPT

## 2023-08-09 PROCEDURE — 82150 ASSAY OF AMYLASE: CPT | Performed by: HOSPITALIST

## 2023-08-09 PROCEDURE — 63600175 PHARM REV CODE 636 W HCPCS: Performed by: RADIOLOGY

## 2023-08-09 PROCEDURE — 82042 OTHER SOURCE ALBUMIN QUAN EA: CPT | Performed by: HOSPITALIST

## 2023-08-09 PROCEDURE — 99233 PR SUBSEQUENT HOSPITAL CARE,LEVL III: ICD-10-PCS | Mod: ,,, | Performed by: HOSPITALIST

## 2023-08-09 PROCEDURE — 83615 LACTATE (LD) (LDH) ENZYME: CPT | Performed by: HOSPITALIST

## 2023-08-09 RX ORDER — GADOBUTROL 604.72 MG/ML
10 INJECTION INTRAVENOUS
Status: COMPLETED | OUTPATIENT
Start: 2023-08-10 | End: 2023-08-09

## 2023-08-09 RX ORDER — OXYCODONE HYDROCHLORIDE 5 MG/1
5 TABLET ORAL ONCE
Status: COMPLETED | OUTPATIENT
Start: 2023-08-09 | End: 2023-08-09

## 2023-08-09 RX ORDER — MIDAZOLAM HYDROCHLORIDE 1 MG/ML
INJECTION INTRAMUSCULAR; INTRAVENOUS
Status: COMPLETED | OUTPATIENT
Start: 2023-08-09 | End: 2023-08-09

## 2023-08-09 RX ORDER — MAGNESIUM SULFATE HEPTAHYDRATE 40 MG/ML
2 INJECTION, SOLUTION INTRAVENOUS ONCE
Status: COMPLETED | OUTPATIENT
Start: 2023-08-09 | End: 2023-08-09

## 2023-08-09 RX ORDER — FENTANYL CITRATE 50 UG/ML
INJECTION, SOLUTION INTRAMUSCULAR; INTRAVENOUS
Status: COMPLETED | OUTPATIENT
Start: 2023-08-09 | End: 2023-08-09

## 2023-08-09 RX ORDER — OXYCODONE HYDROCHLORIDE 5 MG/1
5 TABLET ORAL EVERY 6 HOURS PRN
Status: COMPLETED | OUTPATIENT
Start: 2023-08-09 | End: 2023-08-09

## 2023-08-09 RX ADMIN — MIDAZOLAM HYDROCHLORIDE 1.5 MG: 2 INJECTION, SOLUTION INTRAMUSCULAR; INTRAVENOUS at 08:08

## 2023-08-09 RX ADMIN — GADOBUTROL 10 ML: 604.72 INJECTION INTRAVENOUS at 11:08

## 2023-08-09 RX ADMIN — POTASSIUM BICARBONATE 20 MEQ: 391 TABLET, EFFERVESCENT ORAL at 09:08

## 2023-08-09 RX ADMIN — BUSPIRONE HYDROCHLORIDE 7.5 MG: 5 TABLET ORAL at 08:08

## 2023-08-09 RX ADMIN — PROPRANOLOL HYDROCHLORIDE 10 MG: 10 TABLET ORAL at 09:08

## 2023-08-09 RX ADMIN — OXYCODONE HYDROCHLORIDE 5 MG: 5 TABLET ORAL at 08:08

## 2023-08-09 RX ADMIN — MIDAZOLAM HYDROCHLORIDE 0.5 MG: 2 INJECTION, SOLUTION INTRAMUSCULAR; INTRAVENOUS at 08:08

## 2023-08-09 RX ADMIN — Medication 10 ML: at 09:08

## 2023-08-09 RX ADMIN — PANTOPRAZOLE SODIUM 40 MG: 40 TABLET, DELAYED RELEASE ORAL at 09:08

## 2023-08-09 RX ADMIN — NYSTATIN 500000 UNITS: 100000 SUSPENSION ORAL at 12:08

## 2023-08-09 RX ADMIN — NYSTATIN 500000 UNITS: 100000 SUSPENSION ORAL at 09:08

## 2023-08-09 RX ADMIN — PIPERACILLIN SODIUM AND TAZOBACTAM SODIUM 4.5 G: 4; .5 INJECTION, POWDER, FOR SOLUTION INTRAVENOUS at 05:08

## 2023-08-09 RX ADMIN — IOHEXOL 15 ML: 300 INJECTION, SOLUTION INTRAVENOUS at 08:08

## 2023-08-09 RX ADMIN — SERTRALINE HYDROCHLORIDE 25 MG: 25 TABLET ORAL at 09:08

## 2023-08-09 RX ADMIN — LACTULOSE 10 G: 20 SOLUTION ORAL at 09:08

## 2023-08-09 RX ADMIN — FENTANYL CITRATE 50 MCG: 50 INJECTION, SOLUTION INTRAMUSCULAR; INTRAVENOUS at 08:08

## 2023-08-09 RX ADMIN — NYSTATIN 500000 UNITS: 100000 SUSPENSION ORAL at 05:08

## 2023-08-09 RX ADMIN — ENOXAPARIN SODIUM 40 MG: 40 INJECTION SUBCUTANEOUS at 05:08

## 2023-08-09 RX ADMIN — PROPRANOLOL HYDROCHLORIDE 10 MG: 10 TABLET ORAL at 08:08

## 2023-08-09 RX ADMIN — POTASSIUM BICARBONATE 20 MEQ: 391 TABLET, EFFERVESCENT ORAL at 11:08

## 2023-08-09 RX ADMIN — PIPERACILLIN SODIUM AND TAZOBACTAM SODIUM 4.5 G: 4; .5 INJECTION, POWDER, FOR SOLUTION INTRAVENOUS at 09:08

## 2023-08-09 RX ADMIN — NYSTATIN 500000 UNITS: 100000 SUSPENSION ORAL at 08:08

## 2023-08-09 RX ADMIN — LEVOTHYROXINE SODIUM 75 MCG: 75 TABLET ORAL at 06:08

## 2023-08-09 RX ADMIN — BUSPIRONE HYDROCHLORIDE 7.5 MG: 5 TABLET ORAL at 09:08

## 2023-08-09 RX ADMIN — MAGNESIUM SULFATE 2 G: 2 INJECTION INTRAVENOUS at 06:08

## 2023-08-09 RX ADMIN — LACTULOSE 10 G: 20 SOLUTION ORAL at 08:08

## 2023-08-09 RX ADMIN — PIPERACILLIN SODIUM AND TAZOBACTAM SODIUM 4.5 G: 4; .5 INJECTION, POWDER, FOR SOLUTION INTRAVENOUS at 01:08

## 2023-08-09 NOTE — PROGRESS NOTES
[facility] - Surgery  Infectious Disease  Progress Note    Patient Name: Gregg Page  MRN: [id number]  Admission Date: 8/3/2023  Length of Stay: 5 days  Attending Physician: Rosalind Borden MD  Primary Care Provider: No, Primary Doctor    Isolation Status: No active isolations  Assessment/Plan:      GI  * Intra-abdominal abscess  53M with cirrhosis 2/2 to EtOH use and HCV 2/2 IVDU presented for R-sided abdominal pain, found to have upper abdominal fluid collection w/ loculations around L hepatic lobe, possibly loculated ascites. Hx of non prescribed testosterone self administered injections reported a few weeks ago. Underwent IR drain placement, ongoing serous output in [place]. Fluid infection based on cell counts (WBC 8000 w/ 94% segs), cultures NGTD, started on empiric ceftriaxone and flagyl. Repeat cell counts fom drain 8/7 show [place]BC to [address], 99% segs , decreased drain output, remains afebrile, repeat CT w/ perihepatic fluid collection but w/ significant volume.        Recommendations:    -Change abx to piperacillin/tazobactam in light of worsening cell counts and continue to monitor.   - Discussed with IR, plans for perihepatic fluid collection drain exchange/upsize, will follow up on cell counts and cultures.           Anticipated Disposition: TBD    Thank you for your consult. I will follow-up with patient. Please contact us if you have any additional questions.    Ata Cantor MD  Infectious Disease  [facility] - Surgery    Subjective:     Principal Problem:Intra-abdominal abscess    HPI: 53M with cirrhosis 2/2 to EtOH use and HCV 2/2 IVDU, esophageal varices, GIB, HTN, hypothyroidism, MDD, hydrocephalous, cholelithiasis, who presented to OSH for right sided abdominal pain. He has been seen in the ED multiple times since May 2023 for recurrent R-sided abdominal pain. RUQ US on 8/2 showed multiloculated hypoechoic echogenicity around L hepatic lobe. CT findings include an upper abdominal fluid collection  "without clear origin or etiology and possibly representing a hematoma without active extravasation, surrounding the liver, stomach, and spleen as well as 1.1 cm hypoattenuating lesion of the left hepatic lobe and cholelithiasis. Ascitic fluid culture showing negative gram stain, negative aerobes, other cultures pending. On CTX + metronidazole. ID consulted for "loculated ascitis thought likely abscess with leukocytosis"    Interval History: NAEON    Review of Systems   Constitutional:  Positive for fatigue. Negative for activity change and fever.   HENT:  Negative for congestion and sore throat.    Respiratory:  Negative for shortness of breath.    Cardiovascular:  Negative for chest pain.   Gastrointestinal:  Positive for abdominal distention and abdominal pain. Negative for blood in stool, diarrhea, nausea and vomiting.   Genitourinary:  Negative for dysuria and hematuria.   Musculoskeletal:  Negative for arthralgias, joint swelling and neck stiffness.   Neurological:  Negative for light-headedness.   Psychiatric/Behavioral:  Negative for confusion.        Objective:     Vital Signs (Most Recent):  Temp: 96.3 °F (35.7 °C) (08/08/23 1531)  Pulse: 84 (08/08/23 1531)  Resp: 17 (08/08/23 1531)  BP: 123/77 (08/08/23 1531)  SpO2: 96 % (08/08/23 1531) Vital Signs (24h Range):  Temp:  [96.3 °F (35.7 °C)-99.4 °F (37.4 °C)] 96.3 °F (35.7 °C)  Pulse:  [82-92] 84  Resp:  [16-18] 17  SpO2:  [93 %-97 %] 96 %  BP: (117-128)/(56-77) 123/77     Weight: 92.3 kg (203 lb 7.8 oz)  Body mass index is 28.38 kg/m².    Estimated Creatinine Clearance: 165.3 mL/min (based on SCr of 0.6 mg/dL).     Physical Exam  Constitutional:       Appearance: Normal appearance.   HENT:      Head: Normocephalic and atraumatic.      Nose: Nose normal.      Mouth/Throat:      Mouth: Mucous membranes are moist.      Pharynx: Oropharynx is clear.   Eyes:      Conjunctiva/sclera: Conjunctivae normal.   Cardiovascular:      Rate and Rhythm: Normal rate and " regular rhythm.      Pulses: Normal pulses.      Heart sounds: Normal heart sounds.   Pulmonary:      Effort: Pulmonary effort is normal.      Breath sounds: Normal breath sounds.   Abdominal:      General: Bowel sounds are normal. There is distension.      Palpations: Abdomen is soft.      Tenderness: There is no guarding or rebound.      Comments: Abdominal RUQ drain with yellow tinged serous fluid   Musculoskeletal:         General: No deformity. Normal range of motion.      Cervical back: Normal range of motion and neck supple.   Skin:     General: Skin is warm and dry.      Coloration: Skin is not jaundiced.      Findings: No rash.      Comments: Dry, erythematous scaly rash present on trunk, abdomen and extremities   Neurological:      Mental Status: He is alert and oriented to person, place, and time. Mental status is at baseline.   Psychiatric:         Behavior: Behavior normal.          Significant Labs:   Microbiology Results (last 7 days)       Procedure Component Value Units Date/Time    Blood culture [[id number]] Collected: 08/03/23 1422    Order Status: Completed Specimen: Blood Updated: 08/08/23 1612     Blood Culture, Routine No growth after 5 days.    Blood culture [[id number]] Collected: 08/03/23 1424    Order Status: Completed Specimen: Blood Updated: 08/08/23 1612     Blood Culture, Routine No growth after 5 days.    Culture, Anaerobe [[id number]]     Order Status: No result Specimen: Body Fluid     Aerobic culture [[id number]]     Order Status: No result Specimen: Peritoneal Fluid     Culture, Anaerobe [[id number]] Collected: 08/04/23 1409    Order Status: Completed Specimen: Abscess from Abdomen Updated: 08/08/23 1004     Anaerobic Culture Culture in progress    Aerobic culture [[id number]] Collected: 08/04/23 1409    Order Status: Completed Specimen: Abscess from Abdomen Updated: 08/07/23 0732     Aerobic Bacterial Culture No growth    Gram stain [[id number]] Collected: 08/04/23 1409    Order Status:  Completed Specimen: Abscess from Abdomen Updated: 08/04/23 1846     Gram Stain Result Rare WBC's      No organisms seen    Fungus culture [[id number]] Collected: 08/04/23 1409    Order Status: Sent Specimen: Abscess from Abdomen Updated: 08/04/23 1511          All pertinent labs within the past 24 hours have been reviewed.  Recent Lab Results         08/08/23  0402        Albumin 2.1       Alkaline Phosphatase 111       ALT 14       Anion Gap 12       Aniso Slight       AST 29       Baso # 0.07       Basophil % 0.4       BILIRUBIN TOTAL 0.8  Comment: For infants and newborns, interpretation of results should be based  on gestational age, weight and in agreement with clinical  observations.    Premature Infant recommended reference ranges:  Up to 24 hours.............<8.0 mg/dL  Up to 48 hours............<12.0 mg/dL  3-5 days..................<15.0 mg/dL  6-29 days.................<15.0 mg/dL         BUN 5       [place]/Echinocytes Occasional       Calcium 7.7       Chloride 102       CO2 22       Creatinine 0.6       Differential Method Automated       eGFR >60.0       Eos # 0.3       Eosinophil % 1.4       Glucose 69       Gran # (ANC) 13.1       Gran % 75.3       Hematocrit 36.5       Hemoglobin 12.0       Hypo Occasional       Immature Grans (Abs) 0.22  Comment: Mild elevation in immature granulocytes is non specific and   can be seen in a variety of conditions including stress response,   acute inflammation, trauma and pregnancy. Correlation with other   laboratory and clinical findings is essential.         Immature Granulocytes 1.3       INR 1.3  Comment: Coumadin Therapy:  2.0 - 3.0 for INR for all indicators except mechanical heart valves  and antiphospholipid syndromes which should use 2.5 - 3.5.         Lymph # 1.5       Lymph % 8.8       Magnesium 1.6       MCH 28.8       MCHC 32.9       MCV 88       Mono # 2.2       Mono % 12.8       MPV 11.0       nRBC 0       Ovalocytes Occasional       Phosphorus 2.6        Platelets 94       Poikilocytosis Slight       Poly Occasional       Potassium 3.3       PROTEIN TOTAL 5.4       Protime 14.1       RBC 4.17       RDW 15.4       Sodium 136       Spherocytes Occasional       Toxic Granulation Present       WBC 17.38               Significant Imaging: I have reviewed all pertinent imaging results/findings within the past 24 hours.

## 2023-08-09 NOTE — SUBJECTIVE & OBJECTIVE
Interval History: NAEON    Review of Systems   Constitutional:  Positive for fatigue. Negative for activity change and fever.   HENT:  Negative for congestion and sore throat.    Respiratory:  Negative for shortness of breath.    Cardiovascular:  Negative for chest pain.   Gastrointestinal:  Positive for abdominal distention and abdominal pain. Negative for blood in stool, diarrhea, nausea and vomiting.   Genitourinary:  Negative for dysuria and hematuria.   Musculoskeletal:  Negative for arthralgias, joint swelling and neck stiffness.   Neurological:  Negative for light-headedness.   Psychiatric/Behavioral:  Negative for confusion.        Objective:     Vital Signs (Most Recent):  Temp: 96.3 °F (35.7 °C) (08/08/23 1531)  Pulse: 84 (08/08/23 1531)  Resp: 17 (08/08/23 1531)  BP: 123/77 (08/08/23 1531)  SpO2: 96 % (08/08/23 1531) Vital Signs (24h Range):  Temp:  [96.3 °F (35.7 °C)-99.4 °F (37.4 °C)] 96.3 °F (35.7 °C)  Pulse:  [82-92] 84  Resp:  [16-18] 17  SpO2:  [93 %-97 %] 96 %  BP: (117-128)/(56-77) 123/77     Weight: 92.3 kg (203 lb 7.8 oz)  Body mass index is 28.38 kg/m².    Estimated Creatinine Clearance: 165.3 mL/min (based on SCr of 0.6 mg/dL).     Physical Exam  Constitutional:       Appearance: Normal appearance.   HENT:      Head: Normocephalic and atraumatic.      Nose: Nose normal.      Mouth/Throat:      Mouth: Mucous membranes are moist.      Pharynx: Oropharynx is clear.   Eyes:      Conjunctiva/sclera: Conjunctivae normal.   Cardiovascular:      Rate and Rhythm: Normal rate and regular rhythm.      Pulses: Normal pulses.      Heart sounds: Normal heart sounds.   Pulmonary:      Effort: Pulmonary effort is normal.      Breath sounds: Normal breath sounds.   Abdominal:      General: Bowel sounds are normal. There is distension.      Palpations: Abdomen is soft.      Tenderness: There is no guarding or rebound.      Comments: Abdominal RUQ drain with yellow tinged serous fluid   Musculoskeletal:          General: No deformity. Normal range of motion.      Cervical back: Normal range of motion and neck supple.   Skin:     General: Skin is warm and dry.      Coloration: Skin is not jaundiced.      Findings: No rash.      Comments: Dry, erythematous scaly rash present on trunk, abdomen and extremities   Neurological:      Mental Status: He is alert and oriented to person, place, and time. Mental status is at baseline.   Psychiatric:         Behavior: Behavior normal.          Significant Labs:   Microbiology Results (last 7 days)       Procedure Component Value Units Date/Time    Blood culture [[id number]] Collected: 08/03/23 1422    Order Status: Completed Specimen: Blood Updated: 08/08/23 1612     Blood Culture, Routine No growth after 5 days.    Blood culture [[id number]] Collected: 08/03/23 1424    Order Status: Completed Specimen: Blood Updated: 08/08/23 1612     Blood Culture, Routine No growth after 5 days.    Culture, Anaerobe [[id number]]     Order Status: No result Specimen: Body Fluid     Aerobic culture [[id number]]     Order Status: No result Specimen: Peritoneal Fluid     Culture, Anaerobe [[id number]] Collected: 08/04/23 1409    Order Status: Completed Specimen: Abscess from Abdomen Updated: 08/08/23 1004     Anaerobic Culture Culture in progress    Aerobic culture [[id number]] Collected: 08/04/23 1409    Order Status: Completed Specimen: Abscess from Abdomen Updated: 08/07/23 0732     Aerobic Bacterial Culture No growth    Gram stain [[id number]] Collected: 08/04/23 1409    Order Status: Completed Specimen: Abscess from Abdomen Updated: 08/04/23 1846     Gram Stain Result Rare WBC's      No organisms seen    Fungus culture [[id number]] Collected: 08/04/23 1409    Order Status: Sent Specimen: Abscess from Abdomen Updated: 08/04/23 1511          All pertinent labs within the past 24 hours have been reviewed.  Recent Lab Results         08/08/23  0402        Albumin 2.1       Alkaline Phosphatase 111       ALT  14       Anion Gap 12       Aniso Slight       AST 29       Baso # 0.07       Basophil % 0.4       BILIRUBIN TOTAL 0.8  Comment: For infants and newborns, interpretation of results should be based  on gestational age, weight and in agreement with clinical  observations.    Premature Infant recommended reference ranges:  Up to 24 hours.............<8.0 mg/dL  Up to 48 hours............<12.0 mg/dL  3-5 days..................<15.0 mg/dL  6-29 days.................<15.0 mg/dL         BUN 5       [place]/Echinocytes Occasional       Calcium 7.7       Chloride 102       CO2 22       Creatinine 0.6       Differential Method Automated       eGFR >60.0       Eos # 0.3       Eosinophil % 1.4       Glucose 69       Gran # (ANC) 13.1       Gran % 75.3       Hematocrit 36.5       Hemoglobin 12.0       Hypo Occasional       Immature Grans (Abs) 0.22  Comment: Mild elevation in immature granulocytes is non specific and   can be seen in a variety of conditions including stress response,   acute inflammation, trauma and pregnancy. Correlation with other   laboratory and clinical findings is essential.         Immature Granulocytes 1.3       INR 1.3  Comment: Coumadin Therapy:  2.0 - 3.0 for INR for all indicators except mechanical heart valves  and antiphospholipid syndromes which should use 2.5 - 3.5.         Lymph # 1.5       Lymph % 8.8       Magnesium 1.6       MCH 28.8       MCHC 32.9       MCV 88       Mono # 2.2       Mono % 12.8       MPV 11.0       nRBC 0       Ovalocytes Occasional       Phosphorus 2.6       Platelets 94       Poikilocytosis Slight       Poly Occasional       Potassium 3.3       PROTEIN TOTAL 5.4       Protime 14.1       RBC 4.17       RDW 15.4       Sodium 136       Spherocytes Occasional       Toxic Granulation Present       WBC 17.38               Significant Imaging: I have reviewed all pertinent imaging results/findings within the past 24 hours.

## 2023-08-09 NOTE — ASSESSMENT & PLAN NOTE
MELD 3.0: 12 at 8/9/2023  3:32 AM  MELD-Na: 10 at 8/9/2023  3:32 AM  Calculated from:  Serum Creatinine: 0.6 mg/dL (Using min of 1 mg/dL) at 8/9/2023  3:32 AM  Serum Sodium: 136 mmol/L at 8/9/2023  3:32 AM  Total Bilirubin: 0.9 mg/dL (Using min of 1 mg/dL) at 8/9/2023  3:32 AM  Serum Albumin: 2.2 g/dL at 8/9/2023  3:32 AM  INR(ratio): 1.4 at 8/9/2023  3:32 AM  Age at listing (hypothetical): 53 years  Sex: Male at 8/9/2023  3:32 AM    Ammonia 67, concern for hepatic encephalopathy 2/2 report of one episode of hallucinations per wife. No subsequent episodes.    PLAN:  - hepatology consulted, recs appreciated   - Referred to outpatient hepatologist  - patient on nadolol at OSH, started on propranolol this admission  - continue pantoprazole 40 mg from OSH   - delirium precautions  - lactulose

## 2023-08-09 NOTE — ASSESSMENT & PLAN NOTE
53M with cirrhosis 2/2 to EtOH use and HCV 2/2 IVDU presented for R-sided abdominal pain, found to have upper abdominal fluid collection w/ loculations around L hepatic lobe, possibly loculated ascites. Hx of non prescribed testosterone self administered injections reported a few weeks ago. Underwent IR drain placement, ongoing serous output in [place]. Fluid infection based on cell counts (WBC 8000 w/ 94% segs), cultures NGTD, started on empiric ceftriaxone and flagyl. Repeat cell counts fom drain 8/7 show increased WBC to [address], 99% segs , decreased drain output, remains afebrile, repeat CT w/ perihepatic fluid collection but w/ significant volume.        Recommendations:    -Change abx to piperacillin/tazobactam in light of worsening cell counts and continue to monitor.   - Discussed with IR, plans for perihepatic fluid collection drain exchange/upsize, will follow up on cell counts and cultures.

## 2023-08-09 NOTE — PROGRESS NOTES
[facility] - Surgery  Infectious Disease  Progress Note    Patient Name: Gregg Page  MRN: [id number]  Admission Date: 8/3/2023  Length of Stay: 6 days  Attending Physician: Rosalind Borden MD  Primary Care Provider: No, Primary Doctor    Isolation Status: No active isolations  Assessment/Plan:      GI  * Intra-abdominal abscess  53M with cirrhosis 2/2 to EtOH use and HCV 2/2 IVDU presented for R-sided abdominal pain, found to have upper abdominal fluid collection w/ loculations around L hepatic lobe, possibly loculated ascites. Hx of non prescribed testosterone self administered injections reported a few weeks ago. Underwent IR drain placement, ongoing serous output in [place]. Fluid infection based on cell counts (WBC 8000 w/ 94% segs), cultures NGTD, started on empiric ceftriaxone and flagyl. Repeat cell counts fom drain 8/7 [place] [address], 99% segs , decreased drain output, remains afebrile, repeat CT w/ perihepatic fluid collection but w/ significant volume, s/p IR drain upsize procedure, 400 cc serious fluid removed, 1956 WBC, 90% segs. Remains clinically stable.      Recommendations:    -Continue piperacillin/tazobactam in light of decreasing cell count from today.     - Will discuss with Hepatology with regards to further imaging/intervention for source control, large perihepatic fluid collection would require prolonged course of antimicrobials typically.         Anticipated Disposition: TBD    Thank you for your consult. I will follow-up with patient. Please contact us if you have any additional questions.    Ata Cantor MD  Infectious Disease  [facility] - [facility]    Subjective:     Principal Problem:Intra-abdominal abscess    HPI: 53M with cirrhosis 2/2 to EtOH use and HCV 2/2 IVDU, esophageal varices, GIB, HTN, hypothyroidism, MDD, hydrocephalous, cholelithiasis, who presented to OSH for right sided abdominal pain. He has been seen in the ED multiple times since May 2023 for recurrent R-sided  "abdominal pain. RUQ US on 8/2 showed multiloculated hypoechoic echogenicity around L hepatic lobe. CT findings include an upper abdominal fluid collection without clear origin or etiology and possibly representing a hematoma without active extravasation, surrounding the liver, stomach, and spleen as well as 1.1 cm hypoattenuating lesion of the left hepatic lobe and cholelithiasis. Ascitic fluid culture showing negative gram stain, negative aerobes, other cultures pending. On CTX + metronidazole. ID consulted for "loculated ascitis thought likely abscess with leukocytosis"    Interval History: NAEON, s/p IR procedure for drain upsize, tolerated well, minimal pain surrounding drain site    Review of Systems  Constitutional:  Positive for fatigue. Negative for activity change and fever.   HENT:  Negative for congestion and sore throat.    Respiratory:  Negative for shortness of breath.    Cardiovascular:  Negative for chest pain.   Gastrointestinal:  Positive for abdominal distention and abdominal pain. Negative for blood in stool, diarrhea, nausea and vomiting.   Genitourinary:  Negative for dysuria and hematuria.   Musculoskeletal:  Negative for arthralgias, joint swelling and neck stiffness.   Neurological:  Negative for light-headedness.   Psychiatric/Behavioral:  Negative for confusion.    Objective:     Vital Signs (Most Recent):  Temp: 98.2 °F (36.8 °C) (08/09/23 1621)  Pulse: 87 (08/09/23 1640)  Resp: 17 (08/09/23 1621)  BP: 134/72 (08/09/23 1621)  SpO2: 96 % (08/09/23 1621) Vital Signs (24h Range):  Temp:  [96.1 °F (35.6 °C)-98.6 °F (37 °C)] 98.2 °F (36.8 °C)  Pulse:  [73-87] 87  Resp:  [13-20] 17  SpO2:  [92 %-97 %] 96 %  BP: (106-142)/(55-80) 134/72     Weight: 92.3 kg (203 lb 7.8 oz)  Body mass index is 28.38 kg/m².    Estimated Creatinine Clearance: 165.3 mL/min (based on SCr of 0.6 mg/dL).     Physical Exam   Constitutional:       Appearance: Normal appearance.   HENT:      Head: Normocephalic and " atraumatic.      Nose: Nose normal.      Mouth/Throat:      Mouth: Mucous membranes are moist.      Pharynx: Oropharynx is clear.   Eyes:      Conjunctiva/sclera: Conjunctivae normal.   Cardiovascular:      Rate and Rhythm: Normal rate and regular rhythm.      Pulses: Normal pulses.      Heart sounds: Normal heart sounds.   Pulmonary:      Effort: Pulmonary effort is normal.      Breath sounds: Normal breath sounds.   Abdominal:      General: Bowel sounds are normal. There is distension.      Palpations: Abdomen is soft. Minimal tenderness around drain site     Tenderness: There is no guarding or rebound.      Comments: Abdominal RUQ drain with yellow tinged serous fluid   Musculoskeletal:         General: No deformity. Normal range of motion.      Cervical back: Normal range of motion and neck supple.   Skin:     General: Skin is warm and dry.      Coloration: Skin is not jaundiced.      Findings: No rash.      Comments: Dry, erythematous scaly rash present on trunk, abdomen and extremities   Neurological:      Mental Status: He is alert and oriented to person, place, and time. Mental status is at baseline.   Psychiatric:         Behavior: Behavior normal.   Significant Labs:   Microbiology Results (last 7 days)       Procedure Component Value Units Date/Time    Culture, Anaerobe [[id number]] Collected: 08/09/23 0811    Order Status: Sent Specimen: Body Fluid from Abdomen Updated: 08/09/23 0843    Aerobic culture [[id number]] Collected: 08/09/23 0811    Order Status: Sent Specimen: Peritoneal Fluid from Abdomen Updated: 08/09/23 0842    Blood culture [[id number]] Collected: 08/03/23 1422    Order Status: Completed Specimen: Blood Updated: 08/08/23 1612     Blood Culture, Routine No growth after 5 days.    Blood culture [[id number]] Collected: 08/03/23 1424    Order Status: Completed Specimen: Blood Updated: 08/08/23 1612     Blood Culture, Routine No growth after 5 days.    Culture, Anaerobe [[id number]] Collected:  08/04/23 1409    Order Status: Completed Specimen: Abscess from Abdomen Updated: 08/08/23 1004     Anaerobic Culture Culture in progress    Aerobic culture [[id number]] Collected: 08/04/23 1409    Order Status: Completed Specimen: Abscess from Abdomen Updated: 08/07/23 0732     Aerobic Bacterial Culture No growth    Gram stain [[id number]] Collected: 08/04/23 1409    Order Status: Completed Specimen: Abscess from Abdomen Updated: 08/04/23 1846     Gram Stain Result Rare WBC's      No organisms seen    Fungus culture [[id number]] Collected: 08/04/23 1409    Order Status: Sent Specimen: Abscess from Abdomen Updated: 08/04/23 1511          All pertinent labs within the past 24 hours have been reviewed.  Recent Lab Results         08/09/23  0810   08/09/23  0332        WBC, Body Fluid 1956  Comment: Reference ranges for body fluids not established.   Correlate clinically.           Lymphs, Fluid 5         Segs, Fluid 90         Body Fluid Type Drainage         Monocytes/Macrophages, Fluid 5         Albumin   2.2       Alkaline Phosphatase   101       ALT   13       Anion Gap   10       Aniso   Slight       AST   25       Baso #   0.07       Basophil %   0.4       BILIRUBIN TOTAL   0.9  Comment: For infants and newborns, interpretation of results should be based  on gestational age, weight and in agreement with clinical  observations.    Premature Infant recommended reference ranges:  Up to 24 hours.............<8.0 mg/dL  Up to 48 hours............<12.0 mg/dL  3-5 days..................<15.0 mg/dL  6-29 days.................<15.0 mg/dL         BUN   3       Calcium   7.8       Chloride   104       CO2   22       Creatinine   0.6       Differential Method   Automated       eGFR   >60.0       Eos #   0.3       Eosinophil %   1.8       Fluid Appearance Hazy         Fluid Color Xanthochromic         Glucose   88       Gran # (ANC)   12.2       Gran %   74.0       Hematocrit   36.9       Hemoglobin   11.9       Hypo    Occasional       Immature Grans (Abs)   0.13  Comment: Mild elevation in immature granulocytes is non specific and   can be seen in a variety of conditions including stress response,   acute inflammation, trauma and pregnancy. Correlation with other   laboratory and clinical findings is essential.         Immature Granulocytes   0.8       INR   1.4  Comment: Coumadin Therapy:  2.0 - 3.0 for INR for all indicators except mechanical heart valves  and antiphospholipid syndromes which should use 2.5 - 3.5.         Lymph #   1.6       Lymph %   9.4       Magnesium   1.8       MCH   28.7       MCHC   32.2       MCV   89       Mono #   2.2       Mono %   13.6       MPV   11.7       nRBC   0       Phosphorus   3.2       Platelet Estimate   Decreased       Platelets   108       Poikilocytosis   Slight       Potassium   3.6       PROTEIN TOTAL   5.5       Protime   14.4       RBC   4.14       RDW   15.7       Sodium   136       WBC   16.47               Significant Imaging: I have reviewed all pertinent imaging results/findings within the past 24 hours.

## 2023-08-09 NOTE — SUBJECTIVE & OBJECTIVE
Interval History: NAEO    Review of Systems   Reason unable to perform ROS: pt in IR procedure.     Objective:     Vital Signs (Most Recent):  Temp: 97.8 °F (36.6 °C) (08/09/23 0508)  Pulse: 82 (08/09/23 0805)  Resp: 13 (08/09/23 0805)  BP: 133/74 (08/09/23 0805)  SpO2: 95 % (08/09/23 0805) Vital Signs (24h Range):  Temp:  [96.3 °F (35.7 °C)-98.2 °F (36.8 °C)] 97.8 °F (36.6 °C)  Pulse:  [78-88] 82  Resp:  [13-20] 13  SpO2:  [93 %-97 %] 95 %  BP: (115-142)/(56-80) 133/74     Weight: 92.3 kg (203 lb 7.8 oz)  Body mass index is 28.38 kg/m².    Intake/Output Summary (Last 24 hours) at 8/9/2023 0830  Last data filed at 8/9/2023 0508  Gross per 24 hour   Intake --   Output 55 ml   Net -55 ml         Physical Exam  Vitals and nursing note reviewed.   Constitutional:       Comments: Pt in IR procedure             Significant Labs: All pertinent labs within the past 24 hours have been reviewed.    Significant Imaging: I have reviewed all pertinent imaging results/findings within the past 24 hours.

## 2023-08-09 NOTE — PROGRESS NOTES
[facility] Medicine  Progress Note    Patient Name: Gregg Page  MRN: [id number]  Patient Class: IP- Inpatient   Admission Date: 8/3/2023  Length of Stay: 6 days  Attending Physician: Rosalind Borden MD  Primary Care Provider: Katya, Primary Doctor        Subjective:     Principal Problem:Intra-abdominal abscess        HPI:  Mr. Page is a 53 YOM with cirrhosis 2/2 to EtOH use and HCV, esophageal varices, GIB, HTN, hypothyroidism, MDD, hydrocephalous, cholelithiasis, who presented to OSH for abdominal pain and [place] [facility] of intra-thoracic hematoma and hepatology evaluation. Of note, patient was hospitalized 6/23 with similar presentation had MRCP done at that time (unavailable for review).  Abdominal pain radiates to the chest, onset 6 days prior to presentation at OSH. Patient denies melena, BRBPR, continued abdominal pain or CP. Patient states morning of transfer he started having tongue swelling and pain, feels dry, remitted with water, has dysphagia when his tongue is dry only, pain described as burning and located throughout tongue. No recent procedures/liver biopsy done. Patient also states for the past 1 week has had intractable hiccups which cause anxiety.     Patient hemodynamically stable. Labs concerning for new leukocytosis 17.9, normocytic anemia 12.6 ammonia 50, low Hct 32.2, elevated PT 16.6, INR 1.31, Troponin, lipase, U/A, BNP, CXR wnl    CT A/P with IV contrast done and shows upper abdominal fluid collection likely a hematoma, surrounds the liver, stomach, and spleen. Origin is not identified however may represent a variceal or hepatic hematoma. No active extravasation identified. Collection measures greater than 21.7 cm in transverse diameter and largest single pocket measures approx 7.5cm in maximal AP dimension.      Overview/Hospital Course:  Procal 0.59; blood cultures show NGTD. CT triple phase shows loculated ascites and no visualized liver lesion. IR  drainage of presumed intra-abdominal abscess 8/4/2023; pending cxs. Cytology reveals elevated WBC at 8030; aerobic cx negative, gram stain negative. ID consulted and recommended repeating cell count with continued abx. Repeat abdominal fluid cytology shows WBC increased to 11,000. CT obtained, pending results and IR recs. Repeat CT on 8/8 shows slight improvement in perihepatic fluid and mild increased free abdominopelvic fluid; d/c'd metronidazole and started zosyn. IR drain exchange took place 8/9, repeated cx's obtained.      Interval History: NAEO    Review of Systems   Reason unable to perform ROS: pt in IR procedure.     Objective:     Vital Signs (Most Recent):  Temp: 97.8 °F (36.6 °C) (08/09/23 0508)  Pulse: 82 (08/09/23 0805)  Resp: 13 (08/09/23 0805)  BP: 133/74 (08/09/23 0805)  SpO2: 95 % (08/09/23 0805) Vital Signs (24h Range):  Temp:  [96.3 °F (35.7 °C)-98.2 °F (36.8 °C)] 97.8 °F (36.6 °C)  Pulse:  [78-88] 82  Resp:  [13-20] 13  SpO2:  [93 %-97 %] 95 %  BP: (115-142)/(56-80) 133/74     Weight: 92.3 kg (203 lb 7.8 oz)  Body mass index is 28.38 kg/m².    Intake/Output Summary (Last 24 hours) at 8/9/2023 0830  Last data filed at 8/9/2023 0508  Gross per 24 hour   Intake --   Output 55 ml   Net -55 ml         Physical Exam  Vitals and nursing note reviewed.   Constitutional:       Comments: Pt in IR procedure             Significant Labs: All pertinent labs within the past 24 hours have been reviewed.    Significant Imaging: I have reviewed all pertinent imaging results/findings within the past 24 hours.      Assessment/Plan:      * Intra-abdominal abscess  Mr. Page is a 53 YOM with cirrhosis 2/2 to EtOH use and HCV, esophageal varices, GIB, HTN, hypothyroidism, MDD, hydrocephalous, cholelithiasis, who presented to OSH for abdominal pain and is [place] [facility] evaluation of intra-thoracic hematoma and hepatology evaluation. Presented for abdominal pain which has now resolved. CT A/P with IV contrast  done and shows upper abdominal fluid collection likely a hematoma, surrounds the liver, stomach, and spleen. Origin is not identified however may represent a variceal or hepatic hematoma. No active extravasation identified. Patient transferred for IR and hepatology evaluation. History of past varices ligation. Has 1.1 cm hypoattenuating lesion in left hepatic lobe.     CT triple phase: loculated ascites; no mass lesions of the liver identified, though arterial phase not assessed.  Ascites cultures: negative gram stain and aerobic culture.  WBC 8030 > 10365.    Repeat CT: slight improvement in perihepatic fluid; mild increased free abdominopelvic fluid  8/8: d/c'd metronidazole and started zosyn  8/9: drain exchange per IR, repeat cx's    PLAN:  IR consulted, appreciate recs  - monitor drain output  - when draining less than 25cc then repeat CT  - follow up cultures  ID consulted, appreciate recs  - abx broadened to Zosyn    Cirrhosis    MELD 3.0: 12 at 8/9/2023  3:32 AM  MELD-Na: 10 at 8/9/2023  3:32 AM  Calculated from:  Serum Creatinine: 0.6 mg/dL (Using min of 1 mg/dL) at 8/9/2023  3:32 AM  Serum Sodium: 136 mmol/L at 8/9/2023  3:32 AM  Total Bilirubin: 0.9 mg/dL (Using min of 1 mg/dL) at 8/9/2023  3:32 AM  Serum Albumin: 2.2 g/dL at 8/9/2023  3:32 AM  INR(ratio): 1.4 at 8/9/2023  3:32 AM  Age at listing (hypothetical): 53 years  Sex: Male at 8/9/2023  3:32 AM    Ammonia 67, concern for hepatic encephalopathy 2/2 report of one episode of hallucinations per wife. No subsequent episodes.    PLAN:  - hepatology consulted, recs appreciated   - Referred to outpatient hepatologist  - patient on nadolol at OSH, started on propranolol this admission  - continue pantoprazole 40 mg from OSH   - delirium precautions  - lactulose    Normocytic anemia  Patient presents with anemia 12.1 mcv 87 (normocytic anemia 12.6 at OSH). No known baseline hemoglobin. Patient with new hematoma formation.     Iron panel: iron 26, TIBC 250,  "transferrin 169, Ferritin 213.     8/8: thrombocytes 94, lovenox held  8/9: plts 108    Hiccups  Patient with new hiccups for one week while at OSH. possibly 2/2 from loculated ascites causing irritation to phrenic nerve     Improved with Thorazine. Significantly better now.    PLAN:  - Thorazine PRN  -baclofen prn breakthrough    Tongue lesion  Patient with tongue swelling that started day of admission. White and green lesions on exam, looks dry. Dysphagia with dry tongue otherwise able to swallow. No hoarseness    Improved with Nystatin mouthwash    - SLP consulted  - Nystatin QID    Hypocalcemia    Recent Labs   Lab 08/07/23  0705   CALCIUM 7.7*       No results for input(s): "CAION" in the last 24 hours.      LUISA (iron deficiency anemia)  Transfuse hgb greater than 7      Hypokalemia  Repletion daily      Leukocytosis  Patient with leukocytosis that was present at OSH at 17.9 and downtrending to 15.24. patient otherwise is hemodynamically stable. Low concern for sepsis at this time however high chance for decompensation given presence of hematoma    Unclear etiology consider infected hematoma vs stress    - continue to monitor     HTN (hypertension)  Patient normotensive with hematoma present. Given risk for possible hemorrhagic shock will hold home BP meds     - hold home amlodipine 5   - hold home HCTZ 25     Gallstones  Cholelithiasis without cystitis   Seen on imaging 8/2/23    - monitor ALP and bili    HCV (hepatitis C virus)  Per OSH records patient with history HCV          MDD (major depressive disorder)  - continue home sertraline 50 mg   - continue buspirone 7.5 mg BID    Hypothyroidism  - continue home synthroid 75 mcg      VTE Risk Mitigation (From admission, onward)         Ordered     enoxaparin injection 40 mg  Daily         08/07/23 1005     IP VTE LOW RISK PATIENT  Once         08/07/23 1005     Place sequential compression device  Until discontinued         08/03/23 0108          "       Discharge Planning   DONIS: 8/11/2023     Code Status: Full Code   Is the patient medically ready for discharge?: No    Reason for patient still in hospital (select all that apply): Treatment, Imaging and Consult recommendations  Discharge Plan A: Home with family                  Ailyn Duong DO  Department of Hospital Medicine   [facility] - Surgery

## 2023-08-09 NOTE — ASSESSMENT & PLAN NOTE
Wife reports hallucinations and change in behavior since onset, pt states this occurred once 3 days ago while falling asleep. No known prior episodes. DDx includes hepatic encephalopathy versus delirium

## 2023-08-09 NOTE — ASSESSMENT & PLAN NOTE
53M with cirrhosis 2/2 to EtOH use and HCV 2/2 IVDU presented for R-sided abdominal pain, found to have upper abdominal fluid collection w/ loculations around L hepatic lobe, possibly loculated ascites. Hx of non prescribed testosterone self administered injections reported a few weeks ago. Underwent IR drain placement, ongoing serous output in [place]. Fluid infection based on cell counts (WBC 8000 w/ 94% segs), cultures NGTD, started on empiric ceftriaxone and flagyl. Repeat cell counts fom drain 8/7 show [place]BC to [address], 99% segs , decreased drain output, remains afebrile, repeat CT w/ perihepatic fluid collection but w/ significant volume, s/p IR drain upsize procedure, 400 cc serious fluid removed, 1956 WBC, 90% segs. Remains clinically stable.      Recommendations:    -Continue piperacillin/tazobactam in light of decreasing cell count from today.     - Will discuss with Hepatology with regards to further imaging/intervention for source control, large perihepatic fluid collection would require prolonged course of antimicrobials typically.

## 2023-08-09 NOTE — PROCEDURES
VIR Post-Procedure Note      Pre Op Diagnosis:  Perihepatic fluid collection    Post Op Diagnosis:  same    Procedure:  Image Guided Drain change / Upsize    Procedure performed by: Jade Fung MD     Written Informed Consent Obtained: Yes    Specimen Removed:  No    Estimated Blood Loss: minimal    Findings:     Contrast injection into the drain under fluoro show minimal residual fluid collection. Aspirate reveals very thick yellowish fluid.    Drain was upsized to 16 Fr         Jade Fung MD  VIR Fellow

## 2023-08-09 NOTE — SUBJECTIVE & OBJECTIVE
Interval History: NAEON, s/p IR procedure for drain upsize, tolerated well, minimal pain surrounding drain site    Review of Systems  Constitutional:  Positive for fatigue. Negative for activity change and fever.   HENT:  Negative for congestion and sore throat.    Respiratory:  Negative for shortness of breath.    Cardiovascular:  Negative for chest pain.   Gastrointestinal:  Positive for abdominal distention and abdominal pain. Negative for blood in stool, diarrhea, nausea and vomiting.   Genitourinary:  Negative for dysuria and hematuria.   Musculoskeletal:  Negative for arthralgias, joint swelling and neck stiffness.   Neurological:  Negative for light-headedness.   Psychiatric/Behavioral:  Negative for confusion.    Objective:     Vital Signs (Most Recent):  Temp: 98.2 °F (36.8 °C) (08/09/23 1621)  Pulse: 87 (08/09/23 1640)  Resp: 17 (08/09/23 1621)  BP: 134/72 (08/09/23 1621)  SpO2: 96 % (08/09/23 1621) Vital Signs (24h Range):  Temp:  [96.1 °F (35.6 °C)-98.6 °F (37 °C)] 98.2 °F (36.8 °C)  Pulse:  [73-87] 87  Resp:  [13-20] 17  SpO2:  [92 %-97 %] 96 %  BP: (106-142)/(55-80) 134/72     Weight: 92.3 kg (203 lb 7.8 oz)  Body mass index is 28.38 kg/m².    Estimated Creatinine Clearance: 165.3 mL/min (based on SCr of 0.6 mg/dL).     Physical Exam   Constitutional:       Appearance: Normal appearance.   HENT:      Head: Normocephalic and atraumatic.      Nose: Nose normal.      Mouth/Throat:      Mouth: Mucous membranes are moist.      Pharynx: Oropharynx is clear.   Eyes:      Conjunctiva/sclera: Conjunctivae normal.   Cardiovascular:      Rate and Rhythm: Normal rate and regular rhythm.      Pulses: Normal pulses.      Heart sounds: Normal heart sounds.   Pulmonary:      Effort: Pulmonary effort is normal.      Breath sounds: Normal breath sounds.   Abdominal:      General: Bowel sounds are normal. There is distension.      Palpations: Abdomen is soft. Minimal tenderness around drain site     Tenderness: There is  no guarding or rebound.      Comments: Abdominal RUQ drain with yellow tinged serous fluid   Musculoskeletal:         General: No deformity. Normal range of motion.      Cervical back: Normal range of motion and neck supple.   Skin:     General: Skin is warm and dry.      Coloration: Skin is not jaundiced.      Findings: No rash.      Comments: Dry, erythematous scaly rash present on trunk, abdomen and extremities   Neurological:      Mental Status: He is alert and oriented to person, place, and time. Mental status is at baseline.   Psychiatric:         Behavior: Behavior normal.   Significant Labs:   Microbiology Results (last 7 days)       Procedure Component Value Units Date/Time    Culture, Anaerobe [[id number]] Collected: 08/09/23 0811    Order Status: Sent Specimen: Body Fluid from Abdomen Updated: 08/09/23 0843    Aerobic culture [[id number]] Collected: 08/09/23 0811    Order Status: Sent Specimen: Peritoneal Fluid from Abdomen Updated: 08/09/23 0842    Blood culture [[id number]] Collected: 08/03/23 1422    Order Status: Completed Specimen: Blood Updated: 08/08/23 1612     Blood Culture, Routine No growth after 5 days.    Blood culture [[id number]] Collected: 08/03/23 1424    Order Status: Completed Specimen: Blood Updated: 08/08/23 1612     Blood Culture, Routine No growth after 5 days.    Culture, Anaerobe [[id number]] Collected: 08/04/23 1409    Order Status: Completed Specimen: Abscess from Abdomen Updated: 08/08/23 1004     Anaerobic Culture Culture in progress    Aerobic culture [[id number]] Collected: 08/04/23 1409    Order Status: Completed Specimen: Abscess from Abdomen Updated: 08/07/23 0732     Aerobic Bacterial Culture No growth    Gram stain [[id number]] Collected: 08/04/23 1409    Order Status: Completed Specimen: Abscess from Abdomen Updated: 08/04/23 1846     Gram Stain Result Rare WBC's      No organisms seen    Fungus culture [[id number]] Collected: 08/04/23 1409    Order Status: Sent Specimen:  Abscess from Abdomen Updated: 08/04/23 1511          All pertinent labs within the past 24 hours have been reviewed.  Recent Lab Results         08/09/23  0810   08/09/23  0332        WBC, Body Fluid 1956  Comment: Reference ranges for body fluids not established.   Correlate clinically.           Lymphs, Fluid 5         Segs, Fluid 90         Body Fluid Type Drainage         Monocytes/Macrophages, Fluid 5         Albumin   2.2       Alkaline Phosphatase   101       ALT   13       Anion Gap   10       Aniso   Slight       AST   25       Baso #   0.07       Basophil %   0.4       BILIRUBIN TOTAL   0.9  Comment: For infants and newborns, interpretation of results should be based  on gestational age, weight and in agreement with clinical  observations.    Premature Infant recommended reference ranges:  Up to 24 hours.............<8.0 mg/dL  Up to 48 hours............<12.0 mg/dL  3-5 days..................<15.0 mg/dL  6-29 days.................<15.0 mg/dL         BUN   3       Calcium   7.8       Chloride   104       CO2   22       Creatinine   0.6       Differential Method   Automated       eGFR   >60.0       Eos #   0.3       Eosinophil %   1.8       Fluid Appearance Hazy         Fluid Color Xanthochromic         Glucose   88       Gran # (ANC)   12.2       Gran %   74.0       Hematocrit   36.9       Hemoglobin   11.9       Hypo   Occasional       Immature Grans (Abs)   0.13  Comment: Mild elevation in immature granulocytes is non specific and   can be seen in a variety of conditions including stress response,   acute inflammation, trauma and pregnancy. Correlation with other   laboratory and clinical findings is essential.         Immature Granulocytes   0.8       INR   1.4  Comment: Coumadin Therapy:  2.0 - 3.0 for INR for all indicators except mechanical heart valves  and antiphospholipid syndromes which should use 2.5 - 3.5.         Lymph #   1.6       Lymph %   9.4       Magnesium   1.8       MCH   28.7        MCHC   32.2       MCV   89       Mono #   2.2       Mono %   13.6       MPV   11.7       nRBC   0       Phosphorus   3.2       Platelet Estimate   Decreased       Platelets   108       Poikilocytosis   Slight       Potassium   3.6       PROTEIN TOTAL   5.5       Protime   14.4       RBC   4.14       RDW   15.7       Sodium   136       WBC   16.47               Significant Imaging: I have reviewed all pertinent imaging results/findings within the past 24 hours.

## 2023-08-09 NOTE — PLAN OF CARE
Procedure complete. Pt tolerated well. Recovery for 1 hour. VSS. Site CDI. Pt transferred to MPU bay 5 and report to be given bedside.

## 2023-08-09 NOTE — PLAN OF CARE
Problem: Adult Inpatient Plan of Care  Goal: Plan of Care Review  Outcome: Ongoing, Progressing  Goal: Patient-Specific Goal (Individualized)  Outcome: Ongoing, Progressing  Goal: Absence of Hospital-Acquired Illness or Injury  Outcome: Ongoing, Progressing  Goal: Optimal Comfort and Wellbeing  Outcome: Ongoing, Progressing  Goal: Readiness for Transition of Care  Outcome: Ongoing, Progressing    Patient did well today. Went for a walk with wife, in high spirits and appeared to be more awake and alert today. Patient is eager to go home but understands why he is here and needs to stay. Continues to receive IV antibiotics and will be NPO at midnight for procedure tomorrow with IR. Plan of care continues for this patient.

## 2023-08-09 NOTE — ASSESSMENT & PLAN NOTE
Mr. Page is a 53 YOM with cirrhosis 2/2 to EtOH use and HCV, esophageal varices, GIB, HTN, hypothyroidism, MDD, hydrocephalous, cholelithiasis, who presented to OSH for abdominal pain and [place] [facility] of intra-thoracic hematoma and hepatology evaluation. Presented for abdominal pain which has now resolved. CT A/P with IV contrast done and shows upper abdominal fluid collection likely a hematoma, surrounds the liver, stomach, and spleen. Origin is not identified however may represent a variceal or hepatic hematoma. No active extravasation identified. Patient transferred for IR and hepatology evaluation. History of past varices ligation. Has 1.1 cm hypoattenuating lesion in left hepatic lobe.     CT triple phase: loculated ascites; no mass lesions of the liver identified, though arterial phase not assessed.  Ascites cultures: negative gram stain and aerobic culture.  WBC 8030 > 51935.    Repeat CT: slight improvement in perihepatic fluid; mild increased free abdominopelvic fluid  8/8: d/c'd metronidazole and started zosyn  8/9: drain exchange per IR, repeat cx's    PLAN:  IR consulted, appreciate recs  - monitor drain output  - when draining less than 25cc then repeat CT  - follow up cultures  ID consulted, appreciate recs  - abx broadened to Zosyn

## 2023-08-09 NOTE — PLAN OF CARE
Pt arrived to 189 for Perihepatic drain change. Pt oriented to unit and staff. Plan of care reviewed with patient, patient verbalizes understanding. Comfort measures utilized. Pt safely transferred from stretcher to procedural table. Fall risk reviewed with patient, fall risk interventions maintained. Safety strap applied, positioner pillows utilized to minimize pressure points. Blankets applied. Pt prepped and draped utilizing standard sterile technique. Patient placed on continuous monitoring, as required by sedation policy. Timeouts completed utilizing standard universal time-out, per department and facility policy. RN to remain at bedside, continuous monitoring maintained. Pt resting comfortably. Denies pain/discomfort. Will continue to monitor. See flow sheets for monitoring, medication administration, and updates.

## 2023-08-09 NOTE — ASSESSMENT & PLAN NOTE
Patient presents with anemia 12.1 mcv 87 (normocytic anemia 12.6 at OSH). No known baseline hemoglobin. Patient with new hematoma formation.     Iron panel: iron 26, TIBC 250, transferrin 169, Ferritin 213.     8/8: thrombocytes 94, lovenox held  8/9: plts 108

## 2023-08-10 LAB
ALBUMIN SERPL BCP-MCNC: 2.2 G/DL (ref 3.5–5.2)
ALP SERPL-CCNC: 105 U/L (ref 55–135)
ALT SERPL W/O P-5'-P-CCNC: 13 U/L (ref 10–44)
ANION GAP SERPL CALC-SCNC: 9 MMOL/L (ref 8–16)
AST SERPL-CCNC: 22 U/L (ref 10–40)
BASOPHILS # BLD AUTO: 0.06 K/UL (ref 0–0.2)
BASOPHILS NFR BLD: 0.5 % (ref 0–1.9)
BILIRUB SERPL-MCNC: 1.1 MG/DL (ref 0.1–1)
BUN SERPL-MCNC: 4 MG/DL (ref 6–20)
CALCIUM SERPL-MCNC: 7.8 MG/DL (ref 8.7–10.5)
CHLORIDE SERPL-SCNC: 106 MMOL/L (ref 95–110)
CO2 SERPL-SCNC: 24 MMOL/L (ref 23–29)
CREAT SERPL-MCNC: 0.7 MG/DL (ref 0.5–1.4)
DIFFERENTIAL METHOD: ABNORMAL
EOSINOPHIL # BLD AUTO: 0.3 K/UL (ref 0–0.5)
EOSINOPHIL NFR BLD: 2.3 % (ref 0–8)
ERYTHROCYTE [DISTWIDTH] IN BLOOD BY AUTOMATED COUNT: 15.9 % (ref 11.5–14.5)
EST. GFR  (NO RACE VARIABLE): >60 ML/MIN/1.73 M^2
GLUCOSE SERPL-MCNC: 84 MG/DL (ref 70–110)
HCT VFR BLD AUTO: 37.8 % (ref 40–54)
HGB BLD-MCNC: 12.1 G/DL (ref 14–18)
IMM GRANULOCYTES # BLD AUTO: 0.09 K/UL (ref 0–0.04)
IMM GRANULOCYTES NFR BLD AUTO: 0.7 % (ref 0–0.5)
INR PPP: 1.3 (ref 0.8–1.2)
LYMPHOCYTES # BLD AUTO: 1.5 K/UL (ref 1–4.8)
LYMPHOCYTES NFR BLD: 11.3 % (ref 18–48)
MAGNESIUM SERPL-MCNC: 1.7 MG/DL (ref 1.6–2.6)
MCH RBC QN AUTO: 28.5 PG (ref 27–31)
MCHC RBC AUTO-ENTMCNC: 32 G/DL (ref 32–36)
MCV RBC AUTO: 89 FL (ref 82–98)
MONOCYTES # BLD AUTO: 1.8 K/UL (ref 0.3–1)
MONOCYTES NFR BLD: 13.8 % (ref 4–15)
NEUTROPHILS # BLD AUTO: 9.3 K/UL (ref 1.8–7.7)
NEUTROPHILS NFR BLD: 71.4 % (ref 38–73)
NRBC BLD-RTO: 0 /100 WBC
PHOSPHATE SERPL-MCNC: 3 MG/DL (ref 2.7–4.5)
PLATELET # BLD AUTO: 124 K/UL (ref 150–450)
PMV BLD AUTO: 11 FL (ref 9.2–12.9)
POTASSIUM SERPL-SCNC: 3.8 MMOL/L (ref 3.5–5.1)
PROT SERPL-MCNC: 5.6 G/DL (ref 6–8.4)
PROTHROMBIN TIME: 13.5 SEC (ref 9–12.5)
RBC # BLD AUTO: 4.25 M/UL (ref 4.6–6.2)
SODIUM SERPL-SCNC: 139 MMOL/L (ref 136–145)
WBC # BLD AUTO: 13.07 K/UL (ref 3.9–12.7)

## 2023-08-10 PROCEDURE — 11000001 HC ACUTE MED/SURG PRIVATE ROOM

## 2023-08-10 PROCEDURE — 63600175 PHARM REV CODE 636 W HCPCS: Performed by: STUDENT IN AN ORGANIZED HEALTH CARE EDUCATION/TRAINING PROGRAM

## 2023-08-10 PROCEDURE — A4216 STERILE WATER/SALINE, 10 ML: HCPCS | Performed by: STUDENT IN AN ORGANIZED HEALTH CARE EDUCATION/TRAINING PROGRAM

## 2023-08-10 PROCEDURE — 83735 ASSAY OF MAGNESIUM: CPT

## 2023-08-10 PROCEDURE — 99233 PR SUBSEQUENT HOSPITAL CARE,LEVL III: ICD-10-PCS | Mod: ,,, | Performed by: STUDENT IN AN ORGANIZED HEALTH CARE EDUCATION/TRAINING PROGRAM

## 2023-08-10 PROCEDURE — 84100 ASSAY OF PHOSPHORUS: CPT

## 2023-08-10 PROCEDURE — 80053 COMPREHEN METABOLIC PANEL: CPT

## 2023-08-10 PROCEDURE — 99233 SBSQ HOSP IP/OBS HIGH 50: CPT | Mod: ,,, | Performed by: STUDENT IN AN ORGANIZED HEALTH CARE EDUCATION/TRAINING PROGRAM

## 2023-08-10 PROCEDURE — 36415 COLL VENOUS BLD VENIPUNCTURE: CPT | Performed by: STUDENT IN AN ORGANIZED HEALTH CARE EDUCATION/TRAINING PROGRAM

## 2023-08-10 PROCEDURE — 25500020 PHARM REV CODE 255: Performed by: HOSPITALIST

## 2023-08-10 PROCEDURE — 85025 COMPLETE CBC W/AUTO DIFF WBC: CPT

## 2023-08-10 PROCEDURE — 25000003 PHARM REV CODE 250: Performed by: STUDENT IN AN ORGANIZED HEALTH CARE EDUCATION/TRAINING PROGRAM

## 2023-08-10 PROCEDURE — 25000003 PHARM REV CODE 250

## 2023-08-10 PROCEDURE — 85610 PROTHROMBIN TIME: CPT | Performed by: STUDENT IN AN ORGANIZED HEALTH CARE EDUCATION/TRAINING PROGRAM

## 2023-08-10 PROCEDURE — A9585 GADOBUTROL INJECTION: HCPCS | Performed by: HOSPITALIST

## 2023-08-10 RX ORDER — LACTULOSE 10 G/15ML
5 SOLUTION ORAL DAILY
Status: DISCONTINUED | OUTPATIENT
Start: 2023-08-11 | End: 2023-08-16

## 2023-08-10 RX ADMIN — PIPERACILLIN SODIUM AND TAZOBACTAM SODIUM 4.5 G: 4; .5 INJECTION, POWDER, FOR SOLUTION INTRAVENOUS at 12:08

## 2023-08-10 RX ADMIN — NYSTATIN 500000 UNITS: 100000 SUSPENSION ORAL at 05:08

## 2023-08-10 RX ADMIN — PIPERACILLIN SODIUM AND TAZOBACTAM SODIUM 4.5 G: 4; .5 INJECTION, POWDER, FOR SOLUTION INTRAVENOUS at 01:08

## 2023-08-10 RX ADMIN — PROPRANOLOL HYDROCHLORIDE 10 MG: 10 TABLET ORAL at 08:08

## 2023-08-10 RX ADMIN — PROPRANOLOL HYDROCHLORIDE 10 MG: 10 TABLET ORAL at 09:08

## 2023-08-10 RX ADMIN — BUSPIRONE HYDROCHLORIDE 7.5 MG: 5 TABLET ORAL at 09:08

## 2023-08-10 RX ADMIN — LEVOTHYROXINE SODIUM 75 MCG: 75 TABLET ORAL at 06:08

## 2023-08-10 RX ADMIN — NYSTATIN 500000 UNITS: 100000 SUSPENSION ORAL at 09:08

## 2023-08-10 RX ADMIN — Medication 10 ML: at 09:08

## 2023-08-10 RX ADMIN — ENOXAPARIN SODIUM 40 MG: 40 INJECTION SUBCUTANEOUS at 05:08

## 2023-08-10 RX ADMIN — BUSPIRONE HYDROCHLORIDE 7.5 MG: 5 TABLET ORAL at 08:08

## 2023-08-10 RX ADMIN — NYSTATIN 500000 UNITS: 100000 SUSPENSION ORAL at 08:08

## 2023-08-10 RX ADMIN — SERTRALINE HYDROCHLORIDE 25 MG: 25 TABLET ORAL at 08:08

## 2023-08-10 RX ADMIN — LACTULOSE 10 G: 20 SOLUTION ORAL at 09:08

## 2023-08-10 RX ADMIN — NYSTATIN 500000 UNITS: 100000 SUSPENSION ORAL at 01:08

## 2023-08-10 RX ADMIN — PIPERACILLIN SODIUM AND TAZOBACTAM SODIUM 4.5 G: 4; .5 INJECTION, POWDER, FOR SOLUTION INTRAVENOUS at 09:08

## 2023-08-10 RX ADMIN — PANTOPRAZOLE SODIUM 40 MG: 40 TABLET, DELAYED RELEASE ORAL at 08:08

## 2023-08-10 NOTE — PLAN OF CARE
Problem: Adult Inpatient Plan of Care  Goal: Plan of Care Review  Outcome: Ongoing, Progressing  Goal: Patient-Specific Goal (Individualized)  Outcome: Ongoing, Progressing  Goal: Absence of Hospital-Acquired Illness or Injury  Outcome: Ongoing, Progressing  Goal: Optimal Comfort and Wellbeing  Outcome: Ongoing, Progressing  Goal: Readiness for Transition of Care  Outcome: Ongoing, Progressing     Problem: Adult Inpatient Plan of Care  Goal: Plan of Care Review  Outcome: Ongoing, Progressing     Problem: Adult Inpatient Plan of Care  Goal: Patient-Specific Goal (Individualized)  Outcome: Ongoing, Progressing     Problem: Adult Inpatient Plan of Care  Goal: Absence of Hospital-Acquired Illness or Injury  Outcome: Ongoing, Progressing     Problem: Adult Inpatient Plan of Care  Goal: Optimal Comfort and Wellbeing  Outcome: Ongoing, Progressing     Problem: Adult Inpatient Plan of Care  Goal: Readiness for Transition of Care  Outcome: Ongoing, Progressing   Remain SR on telemetry monitor. PRN medication effective for pain . Explained plan of care, with spouse at bedside; verbalized understanding. Educated pt .on new medicine . No injury during shift, Side rails up x 2, call light by bedside.   [T ambulates to restroom independently

## 2023-08-10 NOTE — PROGRESS NOTES
[facility] Hospital Medicine  Progress Note    Patient Name: Gregg Page  MRN: [id number]  Patient Class: IP- Inpatient   Admission Date: 8/3/2023  Length of Stay: 7 days  Attending Physician: Oliverio Camacho, *  Primary Care Provider: No, Primary Doctor        Subjective:     Principal Problem:Intra-abdominal abscess        HPI:  Mr. Page is a 53 YOM with cirrhosis 2/2 to EtOH use and HCV, esophageal varices, GIB, HTN, hypothyroidism, MDD, hydrocephalous, cholelithiasis, who presented to OSH for abdominal pain and is coming to [facility] for IR evaluation of intra-thoracic hematoma and hepatology evaluation. Of note, patient was hospitalized 6/23 with similar presentation had MRCP done at that time (unavailable for review).  Abdominal pain radiates to the chest, onset 6 days prior to presentation at OSH. Patient denies melena, BRBPR, continued abdominal pain or CP. Patient states morning of transfer he started having tongue swelling and pain, feels dry, remitted with water, has dysphagia when his tongue is dry only, pain described as burning and located throughout tongue. No recent procedures/liver biopsy done. Patient also states for the past 1 week has had intractable hiccups which cause anxiety.     Patient hemodynamically stable. Labs concerning for new leukocytosis 17.9, normocytic anemia 12.6 ammonia 50, low Hct 32.2, elevated PT 16.6, INR 1.31, Troponin, lipase, U/A, BNP, CXR wnl    CT A/P with IV contrast done and shows upper abdominal fluid collection likely a hematoma, surrounds the liver, stomach, and spleen. Origin is not identified however may represent a variceal or hepatic hematoma. No active extravasation identified. Collection measures greater than 21.7 cm in transverse diameter and largest single pocket measures approx 7.5cm in maximal AP dimension.      Overview/Hospital Course:  Procal 0.59; blood cultures show NGTD. CT triple phase shows loculated ascites and no visualized liver  lesion. IR drainage of presumed intra-abdominal abscess 8/4/2023; pending cxs. Cytology reveals elevated WBC at 8030; aerobic cx negative, gram stain negative. ID consulted and recommended repeating cell count with continued abx. Repeat abdominal fluid cytology shows WBC increased to 11,000. CT obtained, pending results and IR recs. Repeat CT on 8/8 shows slight improvement in perihepatic fluid and mild increased free abdominopelvic fluid; d/c'd metronidazole and started zosyn. IR drain exchange took place 8/9, repeated cx's obtained. Now recommending MRCP per ID to assess for hepatic leak contributing to this process.      Interval History: NAEO.     Review of Systems   Constitutional:  Negative for chills and fever.   Respiratory:  Negative for cough and shortness of breath.    Cardiovascular:  Negative for chest pain.   Gastrointestinal:  Positive for abdominal pain. Negative for diarrhea, nausea and vomiting.   Musculoskeletal:  Negative for gait problem.   Skin:  Positive for rash.     Objective:     Vital Signs (Most Recent):  Temp: 98.2 °F (36.8 °C) (08/10/23 0738)  Pulse: 89 (08/10/23 0738)  Resp: 18 (08/10/23 0738)  BP: 126/61 (08/10/23 0738)  SpO2: 95 % (08/10/23 0738) Vital Signs (24h Range):  Temp:  [96.1 °F (35.6 °C)-98.6 °F (37 °C)] 98.2 °F (36.8 °C)  Pulse:  [73-89] 89  Resp:  [14-18] 18  SpO2:  [92 %-97 %] 95 %  BP: (103-145)/(54-72) 126/61     Weight: 92.3 kg (203 lb 7.8 oz)  Body mass index is 28.38 kg/m².    Intake/Output Summary (Last 24 hours) at 8/10/2023 0820  Last data filed at 8/10/2023 0416  Gross per 24 hour   Intake 422 ml   Output 44 ml   Net 378 ml         Physical Exam  Vitals and nursing note reviewed.   Constitutional:       General: He is not in acute distress.     Appearance: Normal appearance.   HENT:      Head: Normocephalic and atraumatic.   Eyes:      Extraocular Movements: Extraocular movements intact.      Pupils: Pupils are equal, round, and reactive to light.    Cardiovascular:      Rate and Rhythm: Normal rate and regular rhythm.      Heart sounds: No murmur heard.  Pulmonary:      Effort: Pulmonary effort is normal. No respiratory distress.      Breath sounds: Normal breath sounds. No wheezing.   Abdominal:      General: There is distension.      Tenderness: There is no abdominal tenderness. There is no guarding.   Musculoskeletal:      Cervical back: Normal range of motion and neck supple.      Right lower leg: No edema.      Left lower leg: No edema.   Skin:     General: Skin is warm and dry.      Findings: Rash present.   Neurological:      Mental Status: He is alert and oriented to person, place, and time.   Psychiatric:         Mood and Affect: Mood normal.         Behavior: Behavior normal.             Significant Labs: All pertinent labs within the past 24 hours have been reviewed.    Significant Imaging: I have reviewed all pertinent imaging results/findings within the past 24 hours.      Assessment/Plan:      * Intra-abdominal abscess  Mr. Page is a 53 YOM with cirrhosis 2/2 to EtOH use and HCV, esophageal varices, GIB, HTN, hypothyroidism, MDD, hydrocephalous, cholelithiasis, who presented to OSH for abdominal pain and is coming to [facility] for IR evaluation of intra-thoracic hematoma and hepatology evaluation. Presented for abdominal pain which has now resolved. CT A/P with IV contrast done and shows upper abdominal fluid collection likely a hematoma, surrounds the liver, stomach, and spleen. Origin is not identified however may represent a variceal or hepatic hematoma. No active extravasation identified. Patient transferred for IR and hepatology evaluation. History of past varices ligation. Has 1.1 cm hypoattenuating lesion in left hepatic lobe.     CT triple phase: loculated ascites; no mass lesions of the liver identified, though arterial phase not assessed.  Ascites cultures: negative gram stain and aerobic culture.  WBC 8030 > 41217.    Repeat CT: slight  improvement in perihepatic fluid; mild increased free abdominopelvic fluid  8/8: d/c'd metronidazole and started zosyn  8/9: drain exchange per IR, repeat cx's    PLAN:  IR consulted, appreciate recs  - monitor drain output  - when draining less than 25cc then repeat CT  - f/u MRCP  - follow up cultures  ID consulted, appreciate recs  - broad abx with Zosyn    Cirrhosis    MELD 3.0: 11 at 8/10/2023  3:22 AM  MELD-Na: 10 at 8/10/2023  3:22 AM  Calculated from:  Serum Creatinine: 0.7 mg/dL (Using min of 1 mg/dL) at 8/10/2023  3:22 AM  Serum Sodium: 139 mmol/L (Using max of 137 mmol/L) at 8/10/2023  3:22 AM  Total Bilirubin: 1.1 mg/dL at 8/10/2023  3:22 AM  Serum Albumin: 2.2 g/dL at 8/10/2023  3:22 AM  INR(ratio): 1.3 at 8/10/2023  3:22 AM  Age at listing (hypothetical): 53 years  Sex: Male at 8/10/2023  3:22 AM    Ammonia 67, concern for hepatic encephalopathy 2/2 report of one episode of hallucinations per wife. No subsequent episodes.    PLAN:  - hepatology consulted, recs appreciated   - Referred to outpatient hepatologist  - patient on nadolol at OSH, started on propranolol this admission  - continue pantoprazole 40 mg from OSH   - delirium precautions  - lactulose    Normocytic anemia  Patient presents with anemia 12.1 mcv 87 (normocytic anemia 12.6 at OSH). No known baseline hemoglobin. Patient with new hematoma formation.     Iron panel: iron 26, TIBC 250, transferrin 169, Ferritin 213.     8/8: thrombocytes 94, lovenox held  8/9: plts 108    Hiccups  Patient with new hiccups for one week while at OSH. possibly 2/2 from loculated ascites causing irritation to phrenic nerve     Improved with Thorazine. Significantly better now.    PLAN:  - Thorazine PRN  -baclofen prn breakthrough    Tongue lesion  Patient with tongue swelling that started day of admission. White and green lesions on exam, looks dry. Dysphagia with dry tongue otherwise able to swallow. No hoarseness    Improved with Nystatin mouthwash    - SLP  "consulted  - Nystatin QID    Hypocalcemia    Recent Labs   Lab 08/07/23  0705   CALCIUM 7.7*       No results for input(s): "CAION" in the last 24 hours.      LUISA (iron deficiency anemia)  Transfuse hgb greater than 7      Hypokalemia  Repletion daily      Leukocytosis  Patient with leukocytosis that was present at OSH at 17.9 and downtrending to 15.24. patient otherwise is hemodynamically stable. Low concern for sepsis at this time however high chance for decompensation given presence of hematoma    Unclear etiology consider infected hematoma vs stress    - continue to monitor     HTN (hypertension)  Patient normotensive with hematoma present. Given risk for possible hemorrhagic shock will hold home BP meds     - hold home amlodipine 5   - hold home HCTZ 25     Gallstones  Cholelithiasis without cystitis   Seen on imaging 8/2/23    - monitor ALP and bili    HCV (hepatitis C virus)  Per OSH records patient with history HCV          MDD (major depressive disorder)  - continue home sertraline 50 mg   - continue buspirone 7.5 mg BID    Hypothyroidism  - continue home synthroid 75 mcg      VTE Risk Mitigation (From admission, onward)         Ordered     enoxaparin injection 40 mg  Daily         08/07/23 1005     IP VTE LOW RISK PATIENT  Once         08/07/23 1005     Place sequential compression device  Until discontinued         08/03/23 0108                Discharge Planning   DONIS: 8/11/2023     Code Status: Full Code   Is the patient medically ready for discharge?: No    Reason for patient still in hospital (select all that apply): Patient trending condition, Imaging and Consult recommendations  Discharge Plan A: Home with family                  Ailyn Duong DO  Department of Hospital Medicine   [facility] - Surgery    "

## 2023-08-10 NOTE — NURSING
Patient returned to unit from MRI  via wheelchair with transport. telemetry in place. Post procedure VS taken and charted in flowsheets. NAD noted.

## 2023-08-10 NOTE — ASSESSMENT & PLAN NOTE
Mr. Page is a 53 YOM with cirrhosis 2/2 to EtOH use and HCV, esophageal varices, GIB, HTN, hypothyroidism, MDD, hydrocephalous, cholelithiasis, who presented to OSH for abdominal pain and is coming to [facility] for IR evaluation of intra-thoracic hematoma and hepatology evaluation. Presented for abdominal pain which has now resolved. CT A/P with IV contrast done and shows upper abdominal fluid collection likely a hematoma, surrounds the liver, stomach, and spleen. Origin is not identified however may represent a variceal or hepatic hematoma. No active extravasation identified. Patient transferred for IR and hepatology evaluation. History of past varices ligation. Has 1.1 cm hypoattenuating lesion in left hepatic lobe.     CT triple phase: loculated ascites; no mass lesions of the liver identified, though arterial phase not assessed.  Ascites cultures: negative gram stain and aerobic culture.  WBC 8030 > 80026.    Repeat CT: slight improvement in perihepatic fluid; mild increased free abdominopelvic fluid  8/8: d/c'd metronidazole and started zosyn  8/9: drain exchange per IR, repeat cx's    PLAN:  IR consulted, appreciate recs  - monitor drain output  - when draining less than 25cc then repeat CT  - f/u MRCP  - follow up cultures  ID consulted, appreciate recs  - broad abx with Zosyn

## 2023-08-10 NOTE — SUBJECTIVE & OBJECTIVE
Interval History: NAEO.     Review of Systems   Constitutional:  Negative for chills and fever.   Respiratory:  Negative for cough and shortness of breath.    Cardiovascular:  Negative for chest pain.   Gastrointestinal:  Positive for abdominal pain. Negative for diarrhea, nausea and vomiting.   Musculoskeletal:  Negative for gait problem.   Skin:  Positive for rash.     Objective:     Vital Signs (Most Recent):  Temp: 98.2 °F (36.8 °C) (08/10/23 0738)  Pulse: 89 (08/10/23 0738)  Resp: 18 (08/10/23 0738)  BP: 126/61 (08/10/23 0738)  SpO2: 95 % (08/10/23 0738) Vital Signs (24h Range):  Temp:  [96.1 °F (35.6 °C)-98.6 °F (37 °C)] 98.2 °F (36.8 °C)  Pulse:  [73-89] 89  Resp:  [14-18] 18  SpO2:  [92 %-97 %] 95 %  BP: (103-145)/(54-72) 126/61     Weight: 92.3 kg (203 lb 7.8 oz)  Body mass index is 28.38 kg/m².    Intake/Output Summary (Last 24 hours) at 8/10/2023 0820  Last data filed at 8/10/2023 0416  Gross per 24 hour   Intake 422 ml   Output 44 ml   Net 378 ml         Physical Exam  Vitals and nursing note reviewed.   Constitutional:       General: He is not in acute distress.     Appearance: Normal appearance.   HENT:      Head: Normocephalic and atraumatic.   Eyes:      Extraocular Movements: Extraocular movements intact.      Pupils: Pupils are equal, round, and reactive to light.   Cardiovascular:      Rate and Rhythm: Normal rate and regular rhythm.      Heart sounds: No murmur heard.  Pulmonary:      Effort: Pulmonary effort is normal. No respiratory distress.      Breath sounds: Normal breath sounds. No wheezing.   Abdominal:      General: There is distension.      Tenderness: There is no abdominal tenderness. There is no guarding.   Musculoskeletal:      Cervical back: Normal range of motion and neck supple.      Right lower leg: No edema.      Left lower leg: No edema.   Skin:     General: Skin is warm and dry.      Findings: Rash present.   Neurological:      Mental Status: He is alert and oriented to  person, place, and time.   Psychiatric:         Mood and Affect: Mood normal.         Behavior: Behavior normal.             Significant Labs: All pertinent labs within the past 24 hours have been reviewed.    Significant Imaging: I have reviewed all pertinent imaging results/findings within the past 24 hours.

## 2023-08-10 NOTE — SUBJECTIVE & OBJECTIVE
Interval History: reports resolution of tenderness around drain site, no new symptoms    Review of Systems  Constitutional:  Positive for fatigue. Negative for activity change and fever.   HENT:  Negative for congestion and sore throat.    Respiratory:  Negative for shortness of breath.    Cardiovascular:  Negative for chest pain.   Gastrointestinal:  Positive for abdominal distention Negative for blood in stool, diarrhea, nausea and vomiting.   Genitourinary:  Negative for dysuria and hematuria.   Musculoskeletal:  Negative for arthralgias, joint swelling and neck stiffness.   Neurological:  Negative for light-headedness.   Psychiatric/Behavioral:  Negative for confusion.    Objective:     Vital Signs (Most Recent):  Temp: 98.2 °F (36.8 °C) (08/11/23 0710)  Pulse: 88 (08/11/23 0710)  Resp: 17 (08/11/23 0710)  BP: (!) 121/56 (08/11/23 0710)  SpO2: (!) 94 % (08/11/23 0710) Vital Signs (24h Range):  Temp:  [96.4 °F (35.8 °C)-98.6 °F (37 °C)] 98.2 °F (36.8 °C)  Pulse:  [79-88] 88  Resp:  [17-20] 17  SpO2:  [92 %-98 %] 94 %  BP: (105-136)/(56-76) 121/56     Weight: 92.3 kg (203 lb 7.8 oz)  Body mass index is 28.38 kg/m².    Estimated Creatinine Clearance: 165.3 mL/min (based on SCr of 0.6 mg/dL).     Physical Exam    Constitutional:       Appearance: Normal appearance.   HENT:      Head: Normocephalic and atraumatic.      Nose: Nose normal.      Mouth/Throat:      Mouth: Mucous membranes are moist.      Pharynx: Oropharynx is clear.   Eyes:      Conjunctiva/sclera: Conjunctivae normal.   Cardiovascular:      Rate and Rhythm: Normal rate and regular rhythm.      Pulses: Normal pulses.      Heart sounds: Normal heart sounds.   Pulmonary:      Effort: Pulmonary effort is normal.      Breath sounds: Normal breath sounds.   Abdominal:      General: Bowel sounds are normal. There is distension.      Palpations: Abdomen is soft. Minimal tenderness around drain site     Tenderness: There is no guarding or rebound.       Comments: Abdominal RUQ drain with yellow tinged serous fluid   Musculoskeletal:         General: No deformity. Normal range of motion.      Cervical back: Normal range of motion and neck supple.   Skin:     General: Skin is warm and dry.      Coloration: Skin is not jaundiced.      Findings: No rash.      Comments: Dry, erythematous scaly rash present on trunk, abdomen and extremities   Neurological:      Mental Status: He is alert and oriented to person, place, and time. Mental status is at baseline.   Psychiatric:         Behavior: Behavior normal.   Significant Labs:   Microbiology Results (last 7 days)       Procedure Component Value Units Date/Time    Fungus culture [[id number]] Collected: 08/04/23 1409    Order Status: Completed Specimen: Abscess from Abdomen Updated: 08/10/23 0752     Fungus (Mycology) Culture Culture in progress    Culture, Anaerobe [[id number]] Collected: 08/09/23 0811    Order Status: Completed Specimen: Body Fluid from Abdomen Updated: 08/10/23 0746     Anaerobic Culture Culture in progress    Aerobic culture [[id number]] Collected: 08/09/23 0811    Order Status: Completed Specimen: Peritoneal Fluid from Abdomen Updated: 08/10/23 0716     Aerobic Bacterial Culture No growth    Blood culture [[id number]] Collected: 08/03/23 1422    Order Status: Completed Specimen: Blood Updated: 08/08/23 1612     Blood Culture, Routine No growth after 5 days.    Blood culture [[id number]] Collected: 08/03/23 1424    Order Status: Completed Specimen: Blood Updated: 08/08/23 1612     Blood Culture, Routine No growth after 5 days.    Culture, Anaerobe [[id number]] Collected: 08/04/23 1409    Order Status: Completed Specimen: Abscess from Abdomen Updated: 08/08/23 1004     Anaerobic Culture Culture in progress    Aerobic culture [[id number]] Collected: 08/04/23 1409    Order Status: Completed Specimen: Abscess from Abdomen Updated: 08/07/23 0732     Aerobic Bacterial Culture No growth    Gram stain [[id number]]  Collected: 08/04/23 1409    Order Status: Completed Specimen: Abscess from Abdomen Updated: 08/04/23 1846     Gram Stain Result Rare WBC's      No organisms seen          All pertinent labs within the past 24 hours have been reviewed.  Recent Lab Results         08/11/23  0302        Albumin 2.1       Alkaline Phosphatase 101       ALT 13       Anion Gap 10       AST 22       Baso # 0.08       Basophil % 0.6       BILIRUBIN TOTAL 0.8  Comment: For infants and newborns, interpretation of results should be based  on gestational age, weight and in agreement with clinical  observations.    Premature Infant recommended reference ranges:  Up to 24 hours.............<8.0 mg/dL  Up to 48 hours............<12.0 mg/dL  3-5 days..................<15.0 mg/dL  6-29 days.................<15.0 mg/dL         BUN 6       Calcium 8.0       Chloride 107       CO2 23       Creatinine 0.6       Differential Method Automated       eGFR >60.0       Eos # 0.3       Eosinophil % 2.6       Glucose 98       Gran # (ANC) 8.6       Gran % 68.8       Hematocrit 35.7       Hemoglobin 11.4       Immature Grans (Abs) 0.05  Comment: Mild elevation in immature granulocytes is non specific and   can be seen in a variety of conditions including stress response,   acute inflammation, trauma and pregnancy. Correlation with other   laboratory and clinical findings is essential.         Immature Granulocytes 0.4       INR 1.2  Comment: Coumadin Therapy:  2.0 - 3.0 for INR for all indicators except mechanical heart valves  and antiphospholipid syndromes which should use 2.5 - 3.5.         Lymph # 1.6       Lymph % 13.0       Magnesium 1.8       MCH 28.3       MCHC 31.9       MCV 89       Mono # 1.8       Mono % 14.6       MPV 11.5       nRBC 0       Phosphorus 3.1       Platelets 132       Potassium 3.8       PROTEIN TOTAL 5.4       Protime 13.0       RBC 4.03       RDW 15.8       Sodium 140       WBC 12.51               Significant Imaging: I have reviewed  all pertinent imaging results/findings within the past 24 hours.

## 2023-08-10 NOTE — PLAN OF CARE
08/10/23 1104   Post-Acute Status   Post-Acute Authorization Placement   Post-Acute Placement Status Referrals Sent     SW sent referral to ([facility], [facility], and [facility]) to follow for possible LTAC placement. CM team to follow.    Faye Weaver LMSW  Case Management   [facility]   Ext. [phone]

## 2023-08-10 NOTE — ASSESSMENT & PLAN NOTE
53M with cirrhosis 2/2 to EtOH use and HCV 2/2 IVDU presented for R-sided abdominal pain, found to have upper abdominal fluid collection w/ loculations around L hepatic lobe, possibly loculated ascites. Hx of non prescribed testosterone self administered injections reported a few weeks ago. Underwent IR drain placement, ongoing serous output in [facility]. Fluid infection based on cell counts (WBC 8000 w/ 94% segs), cultures NGTD, started on empiric ceftriaxone and flagyl. Repeat cell counts fom drain 8/7 show [place]BC to [address], 99% segs , decreased drain output, remains afebrile, repeat CT w/ perihepatic fluid collection but w/ significant volume, s/p IR drain upsize procedure, 400 cc serious fluid removed, 1956 WBC, 90% segs. Remains clinically stable. MRCP does not reveal any leak in biliary tree or pancreatic ductal dilation.        Recommendations:      -- IV piperacillin/tazobactam 13.5 grams IV q8 hours or 12 grams IV via continuous infusion daily, plan for four weeks of therapy for now, tentative EOT 09/07/2023, with repeat imaging later and possible extension/adjustment based on findings.     - Difficult to decompress/resolve fluid collection at once given complexity, will require prolonged antimicrobials and close follow up. In the setting of agressive drain management and IV antimicrobials, best clinical course would be to attempt for a facility rather than home for discharge, discussed with primary team.

## 2023-08-10 NOTE — ASSESSMENT & PLAN NOTE
MELD 3.0: 11 at 8/10/2023  3:22 AM  MELD-Na: 10 at 8/10/2023  3:22 AM  Calculated from:  Serum Creatinine: 0.7 mg/dL (Using min of 1 mg/dL) at 8/10/2023  3:22 AM  Serum Sodium: 139 mmol/L (Using max of 137 mmol/L) at 8/10/2023  3:22 AM  Total Bilirubin: 1.1 mg/dL at 8/10/2023  3:22 AM  Serum Albumin: 2.2 g/dL at 8/10/2023  3:22 AM  INR(ratio): 1.3 at 8/10/2023  3:22 AM  Age at listing (hypothetical): 53 years  Sex: Male at 8/10/2023  3:22 AM    Ammonia 67, concern for hepatic encephalopathy 2/2 report of one episode of hallucinations per wife. No subsequent episodes.    PLAN:  - hepatology consulted, recs appreciated   - Referred to outpatient hepatologist  - patient on nadolol at OSH, started on propranolol this admission  - continue pantoprazole 40 mg from OSH   - delirium precautions  - lactulose

## 2023-08-11 DIAGNOSIS — R18.8 ABDOMINAL FLUID COLLECTION: ICD-10-CM

## 2023-08-11 DIAGNOSIS — R18.8 CIRRHOSIS OF LIVER WITH ASCITES, UNSPECIFIED HEPATIC CIRRHOSIS TYPE: Primary | ICD-10-CM

## 2023-08-11 DIAGNOSIS — K74.60 CIRRHOSIS OF LIVER WITH ASCITES, UNSPECIFIED HEPATIC CIRRHOSIS TYPE: Primary | ICD-10-CM

## 2023-08-11 LAB
ALBUMIN SERPL BCP-MCNC: 2.1 G/DL (ref 3.5–5.2)
ALP SERPL-CCNC: 101 U/L (ref 55–135)
ALT SERPL W/O P-5'-P-CCNC: 13 U/L (ref 10–44)
ANION GAP SERPL CALC-SCNC: 10 MMOL/L (ref 8–16)
AST SERPL-CCNC: 22 U/L (ref 10–40)
BACTERIA SPEC ANAEROBE CULT: NORMAL
BASOPHILS # BLD AUTO: 0.08 K/UL (ref 0–0.2)
BASOPHILS NFR BLD: 0.6 % (ref 0–1.9)
BILIRUB SERPL-MCNC: 0.8 MG/DL (ref 0.1–1)
BUN SERPL-MCNC: 6 MG/DL (ref 6–20)
CALCIUM SERPL-MCNC: 8 MG/DL (ref 8.7–10.5)
CHLORIDE SERPL-SCNC: 107 MMOL/L (ref 95–110)
CO2 SERPL-SCNC: 23 MMOL/L (ref 23–29)
CREAT SERPL-MCNC: 0.6 MG/DL (ref 0.5–1.4)
DIFFERENTIAL METHOD: ABNORMAL
EOSINOPHIL # BLD AUTO: 0.3 K/UL (ref 0–0.5)
EOSINOPHIL NFR BLD: 2.6 % (ref 0–8)
ERYTHROCYTE [DISTWIDTH] IN BLOOD BY AUTOMATED COUNT: 15.8 % (ref 11.5–14.5)
EST. GFR  (NO RACE VARIABLE): >60 ML/MIN/1.73 M^2
GLUCOSE SERPL-MCNC: 98 MG/DL (ref 70–110)
HCT VFR BLD AUTO: 35.7 % (ref 40–54)
HGB BLD-MCNC: 11.4 G/DL (ref 14–18)
IMM GRANULOCYTES # BLD AUTO: 0.05 K/UL (ref 0–0.04)
IMM GRANULOCYTES NFR BLD AUTO: 0.4 % (ref 0–0.5)
INR PPP: 1.2 (ref 0.8–1.2)
LIPASE FLD-CCNC: <3 U/L
LYMPHOCYTES # BLD AUTO: 1.6 K/UL (ref 1–4.8)
LYMPHOCYTES NFR BLD: 13 % (ref 18–48)
MAGNESIUM SERPL-MCNC: 1.8 MG/DL (ref 1.6–2.6)
MCH RBC QN AUTO: 28.3 PG (ref 27–31)
MCHC RBC AUTO-ENTMCNC: 31.9 G/DL (ref 32–36)
MCV RBC AUTO: 89 FL (ref 82–98)
MONOCYTES # BLD AUTO: 1.8 K/UL (ref 0.3–1)
MONOCYTES NFR BLD: 14.6 % (ref 4–15)
NEUTROPHILS # BLD AUTO: 8.6 K/UL (ref 1.8–7.7)
NEUTROPHILS NFR BLD: 68.8 % (ref 38–73)
NRBC BLD-RTO: 0 /100 WBC
PHOSPHATE SERPL-MCNC: 3.1 MG/DL (ref 2.7–4.5)
PLATELET # BLD AUTO: 132 K/UL (ref 150–450)
PMV BLD AUTO: 11.5 FL (ref 9.2–12.9)
POTASSIUM SERPL-SCNC: 3.8 MMOL/L (ref 3.5–5.1)
PROT SERPL-MCNC: 5.4 G/DL (ref 6–8.4)
PROTHROMBIN TIME: 13 SEC (ref 9–12.5)
RBC # BLD AUTO: 4.03 M/UL (ref 4.6–6.2)
SODIUM SERPL-SCNC: 140 MMOL/L (ref 136–145)
WBC # BLD AUTO: 12.51 K/UL (ref 3.9–12.7)

## 2023-08-11 PROCEDURE — 84100 ASSAY OF PHOSPHORUS: CPT

## 2023-08-11 PROCEDURE — A4216 STERILE WATER/SALINE, 10 ML: HCPCS | Performed by: STUDENT IN AN ORGANIZED HEALTH CARE EDUCATION/TRAINING PROGRAM

## 2023-08-11 PROCEDURE — 11000001 HC ACUTE MED/SURG PRIVATE ROOM

## 2023-08-11 PROCEDURE — 85025 COMPLETE CBC W/AUTO DIFF WBC: CPT

## 2023-08-11 PROCEDURE — 99233 SBSQ HOSP IP/OBS HIGH 50: CPT | Mod: ,,, | Performed by: STUDENT IN AN ORGANIZED HEALTH CARE EDUCATION/TRAINING PROGRAM

## 2023-08-11 PROCEDURE — 80053 COMPREHEN METABOLIC PANEL: CPT

## 2023-08-11 PROCEDURE — 83735 ASSAY OF MAGNESIUM: CPT

## 2023-08-11 PROCEDURE — 25000003 PHARM REV CODE 250

## 2023-08-11 PROCEDURE — 25000003 PHARM REV CODE 250: Performed by: STUDENT IN AN ORGANIZED HEALTH CARE EDUCATION/TRAINING PROGRAM

## 2023-08-11 PROCEDURE — 99233 SBSQ HOSP IP/OBS HIGH 50: CPT | Mod: ,,, | Performed by: INTERNAL MEDICINE

## 2023-08-11 PROCEDURE — 99233 PR SUBSEQUENT HOSPITAL CARE,LEVL III: ICD-10-PCS | Mod: ,,, | Performed by: INTERNAL MEDICINE

## 2023-08-11 PROCEDURE — 85610 PROTHROMBIN TIME: CPT | Performed by: STUDENT IN AN ORGANIZED HEALTH CARE EDUCATION/TRAINING PROGRAM

## 2023-08-11 PROCEDURE — 99233 PR SUBSEQUENT HOSPITAL CARE,LEVL III: ICD-10-PCS | Mod: ,,, | Performed by: STUDENT IN AN ORGANIZED HEALTH CARE EDUCATION/TRAINING PROGRAM

## 2023-08-11 PROCEDURE — 99223 1ST HOSP IP/OBS HIGH 75: CPT | Mod: ,,, | Performed by: PHYSICIAN ASSISTANT

## 2023-08-11 PROCEDURE — 99223 PR INITIAL HOSPITAL CARE,LEVL III: ICD-10-PCS | Mod: ,,, | Performed by: PHYSICIAN ASSISTANT

## 2023-08-11 PROCEDURE — 63600175 PHARM REV CODE 636 W HCPCS: Performed by: STUDENT IN AN ORGANIZED HEALTH CARE EDUCATION/TRAINING PROGRAM

## 2023-08-11 RX ADMIN — PROPRANOLOL HYDROCHLORIDE 10 MG: 10 TABLET ORAL at 09:08

## 2023-08-11 RX ADMIN — NYSTATIN 500000 UNITS: 100000 SUSPENSION ORAL at 02:08

## 2023-08-11 RX ADMIN — NYSTATIN 500000 UNITS: 100000 SUSPENSION ORAL at 09:08

## 2023-08-11 RX ADMIN — LACTULOSE 5 G: 20 SOLUTION ORAL at 09:08

## 2023-08-11 RX ADMIN — LEVOTHYROXINE SODIUM 75 MCG: 75 TABLET ORAL at 05:08

## 2023-08-11 RX ADMIN — PIPERACILLIN SODIUM AND TAZOBACTAM SODIUM 4.5 G: 4; .5 INJECTION, POWDER, FOR SOLUTION INTRAVENOUS at 02:08

## 2023-08-11 RX ADMIN — BUSPIRONE HYDROCHLORIDE 7.5 MG: 5 TABLET ORAL at 09:08

## 2023-08-11 RX ADMIN — ENOXAPARIN SODIUM 40 MG: 40 INJECTION SUBCUTANEOUS at 05:08

## 2023-08-11 RX ADMIN — PANTOPRAZOLE SODIUM 40 MG: 40 TABLET, DELAYED RELEASE ORAL at 09:08

## 2023-08-11 RX ADMIN — SERTRALINE HYDROCHLORIDE 25 MG: 25 TABLET ORAL at 09:08

## 2023-08-11 RX ADMIN — PIPERACILLIN SODIUM AND TAZOBACTAM SODIUM 4.5 G: 4; .5 INJECTION, POWDER, FOR SOLUTION INTRAVENOUS at 09:08

## 2023-08-11 RX ADMIN — PIPERACILLIN SODIUM AND TAZOBACTAM SODIUM 4.5 G: 4; .5 INJECTION, POWDER, FOR SOLUTION INTRAVENOUS at 05:08

## 2023-08-11 RX ADMIN — Medication 10 ML: at 09:08

## 2023-08-11 NOTE — PROGRESS NOTES
[facility] - Surgery  Infectious Disease  Progress Note    Patient Name: Gregg Page  MRN: [id number]  Admission Date: 8/3/2023  Length of Stay: 8 days  Attending Physician: Oliverio Camacho, *  Primary Care Provider: No, Primary Doctor    Isolation Status: No active isolations  Assessment/Plan:      GI  * Intra-abdominal abscess  53M with cirrhosis 2/2 to EtOH use and HCV 2/2 IVDU presented for R-sided abdominal pain, found to have upper abdominal fluid collection w/ loculations around L hepatic lobe, possibly loculated ascites. Hx of non prescribed testosterone self administered injections reported a few weeks ago. Underwent IR drain placement, ongoing serous output in [facility]. Fluid infection based on cell counts (WBC 8000 w/ 94% segs), cultures NGTD, started on empiric ceftriaxone and flagyl. Repeat cell counts fom drain 8/7 show increased WBC to [address], 99% segs , decreased drain output, remains afebrile, repeat CT w/ perihepatic fluid collection but w/ significant volume, s/p IR drain upsize procedure, 400 cc serious fluid removed, 1956 WBC, 90% segs. Remains clinically stable. MRCP does not reveal any leak in biliary tree or pancreatic ductal dilation.        Recommendations:      -- IV piperacillin/tazobactam 13.5 grams IV q8 hours or 12 grams IV via continuous infusion daily, plan for four weeks of therapy for now, tentative EOT 09/07/2023, with repeat imaging later and possible extension/adjustment based on findings.     - Difficult to decompress/resolve fluid collection at once given complexity, will require prolonged antimicrobials and close follow up. In the setting of agressive drain management and IV antimicrobials, best clinical course would be to attempt for a facility rather than home for discharge, discussed with primary team.               Anticipated Disposition: TBD    Thank you for your consult.    Ata Cantor MD  Infectious Disease  [facility] - Surgery    Subjective:     Principal  "Problem:Intra-abdominal abscess    HPI: 53M with cirrhosis 2/2 to EtOH use and HCV 2/2 IVDU, esophageal varices, GIB, HTN, hypothyroidism, MDD, hydrocephalous, cholelithiasis, who presented to OSH for right sided abdominal pain. He has been seen in the ED multiple times since May 2023 for recurrent R-sided abdominal pain. RUQ US on 8/2 showed multiloculated hypoechoic echogenicity around L hepatic lobe. CT findings include an upper abdominal fluid collection without clear origin or etiology and possibly representing a hematoma without active extravasation, surrounding the liver, stomach, and spleen as well as 1.1 cm hypoattenuating lesion of the left hepatic lobe and cholelithiasis. Ascitic fluid culture showing negative gram stain, negative aerobes, other cultures pending. On CTX + metronidazole. ID consulted for "loculated ascitis thought likely abscess with leukocytosis"    Interval History: reports resolution of tenderness around drain site, no new symptoms    Review of Systems  Constitutional:  Positive for fatigue. Negative for activity change and fever.   HENT:  Negative for congestion and sore throat.    Respiratory:  Negative for shortness of breath.    Cardiovascular:  Negative for chest pain.   Gastrointestinal:  Positive for abdominal distention Negative for blood in stool, diarrhea, nausea and vomiting.   Genitourinary:  Negative for dysuria and hematuria.   Musculoskeletal:  Negative for arthralgias, joint swelling and neck stiffness.   Neurological:  Negative for light-headedness.   Psychiatric/Behavioral:  Negative for confusion.    Objective:     Vital Signs (Most Recent):  Temp: 98.2 °F (36.8 °C) (08/11/23 0710)  Pulse: 88 (08/11/23 0710)  Resp: 17 (08/11/23 0710)  BP: (!) 121/56 (08/11/23 0710)  SpO2: (!) 94 % (08/11/23 0710) Vital Signs (24h Range):  Temp:  [96.4 °F (35.8 °C)-98.6 °F (37 °C)] 98.2 °F (36.8 °C)  Pulse:  [79-88] 88  Resp:  [17-20] 17  SpO2:  [92 %-98 %] 94 %  BP: (105-136)/(56-76) " 121/56     Weight: 92.3 kg (203 lb 7.8 oz)  Body mass index is 28.38 kg/m².    Estimated Creatinine Clearance: 165.3 mL/min (based on SCr of 0.6 mg/dL).     Physical Exam    Constitutional:       Appearance: Normal appearance.   HENT:      Head: Normocephalic and atraumatic.      Nose: Nose normal.      Mouth/Throat:      Mouth: Mucous membranes are moist.      Pharynx: Oropharynx is clear.   Eyes:      Conjunctiva/sclera: Conjunctivae normal.   Cardiovascular:      Rate and Rhythm: Normal rate and regular rhythm.      Pulses: Normal pulses.      Heart sounds: Normal heart sounds.   Pulmonary:      Effort: Pulmonary effort is normal.      Breath sounds: Normal breath sounds.   Abdominal:      General: Bowel sounds are normal. There is distension.      Palpations: Abdomen is soft. Minimal tenderness around drain site     Tenderness: There is no guarding or rebound.      Comments: Abdominal RUQ drain with yellow tinged serous fluid   Musculoskeletal:         General: No deformity. Normal range of motion.      Cervical back: Normal range of motion and neck supple.   Skin:     General: Skin is warm and dry.      Coloration: Skin is not jaundiced.      Findings: No rash.      Comments: Dry, erythematous scaly rash present on trunk, abdomen and extremities   Neurological:      Mental Status: He is alert and oriented to person, place, and time. Mental status is at baseline.   Psychiatric:         Behavior: Behavior normal.   Significant Labs:   Microbiology Results (last 7 days)       Procedure Component Value Units Date/Time    Fungus culture [[id number]] Collected: 08/04/23 1409    Order Status: Completed Specimen: Abscess from Abdomen Updated: 08/10/23 0752     Fungus (Mycology) Culture Culture in progress    Culture, Anaerobe [[id number]] Collected: 08/09/23 0811    Order Status: Completed Specimen: Body Fluid from Abdomen Updated: 08/10/23 0746     Anaerobic Culture Culture in progress    Aerobic culture [[id number]]  Collected: 08/09/23 0811    Order Status: Completed Specimen: Peritoneal Fluid from Abdomen Updated: 08/10/23 0716     Aerobic Bacterial Culture No growth    Blood culture [[id number]] Collected: 08/03/23 1422    Order Status: Completed Specimen: Blood Updated: 08/08/23 1612     Blood Culture, Routine No growth after 5 days.    Blood culture [[id number]] Collected: 08/03/23 1424    Order Status: Completed Specimen: Blood Updated: 08/08/23 1612     Blood Culture, Routine No growth after 5 days.    Culture, Anaerobe [[id number]] Collected: 08/04/23 1409    Order Status: Completed Specimen: Abscess from Abdomen Updated: 08/08/23 1004     Anaerobic Culture Culture in progress    Aerobic culture [[id number]] Collected: 08/04/23 1409    Order Status: Completed Specimen: Abscess from Abdomen Updated: 08/07/23 0732     Aerobic Bacterial Culture No growth    Gram stain [[id number]] Collected: 08/04/23 1409    Order Status: Completed Specimen: Abscess from Abdomen Updated: 08/04/23 1846     Gram Stain Result Rare WBC's      No organisms seen          All pertinent labs within the past 24 hours have been reviewed.  Recent Lab Results         08/11/23  0302        Albumin 2.1       Alkaline Phosphatase 101       ALT 13       Anion Gap 10       AST 22       Baso # 0.08       Basophil % 0.6       BILIRUBIN TOTAL 0.8  Comment: For infants and newborns, interpretation of results should be based  on gestational age, weight and in agreement with clinical  observations.    Premature Infant recommended reference ranges:  Up to 24 hours.............<8.0 mg/dL  Up to 48 hours............<12.0 mg/dL  3-5 days..................<15.0 mg/dL  6-29 days.................<15.0 mg/dL         BUN 6       Calcium 8.0       Chloride 107       CO2 23       Creatinine 0.6       Differential Method Automated       eGFR >60.0       Eos # 0.3       Eosinophil % 2.6       Glucose 98       Gran # (ANC) 8.6       Gran % 68.8       Hematocrit 35.7        Hemoglobin 11.4       Immature Grans (Abs) 0.05  Comment: Mild elevation in immature granulocytes is non specific and   can be seen in a variety of conditions including stress response,   acute inflammation, trauma and pregnancy. Correlation with other   laboratory and clinical findings is essential.         Immature Granulocytes 0.4       INR 1.2  Comment: Coumadin Therapy:  2.0 - 3.0 for INR for all indicators except mechanical heart valves  and antiphospholipid syndromes which should use 2.5 - 3.5.         Lymph # 1.6       Lymph % 13.0       Magnesium 1.8       MCH 28.3       MCHC 31.9       MCV 89       Mono # 1.8       Mono % 14.6       MPV 11.5       nRBC 0       Phosphorus 3.1       Platelets 132       Potassium 3.8       PROTEIN TOTAL 5.4       Protime 13.0       RBC 4.03       RDW 15.8       Sodium 140       WBC 12.51               Significant Imaging: I have reviewed all pertinent imaging results/findings within the past 24 hours.

## 2023-08-11 NOTE — PLAN OF CARE
JANEL sent updated clinicals to referred LTAC's to seek placement for the pt.    Faye Weaver LMSW  Case Management   [facility]   Ext. [phone]

## 2023-08-11 NOTE — PLAN OF CARE
Plan of care reviewed and updated . Pt AA+O . Pt's pain is managed with medication ordered at this timed . Pt's V/S are as charted . No falls this shift. . Pt is oriented to room and call system . Will continue to monitor . Patient ambulate in hallway and hospital with wife .

## 2023-08-11 NOTE — PROGRESS NOTES
[facility] Medicine  Progress Note    Patient Name: Gregg Page  MRN: [id number]  Patient Class: IP- Inpatient   Admission Date: 8/3/2023  Length of Stay: 8 days  Attending Physician: Oliverio Camacho, *  Primary Care Provider: No, Primary Doctor        Subjective:     Principal Problem:Intra-abdominal abscess        HPI:  Mr. Page is a 53 YOM with cirrhosis 2/2 to EtOH use and HCV, esophageal varices, GIB, HTN, hypothyroidism, MDD, hydrocephalous, cholelithiasis, who presented to OSH for abdominal pain and is coming to [facility] for IR evaluation of intra-thoracic hematoma and hepatology evaluation. Of note, patient was hospitalized 6/23 with similar presentation had MRCP done at that time (unavailable for review).  Abdominal pain radiates to the chest, onset 6 days prior to presentation at OSH. Patient denies melena, BRBPR, continued abdominal pain or CP. Patient states morning of transfer he started having tongue swelling and pain, feels dry, remitted with water, has dysphagia when his tongue is dry only, pain described as burning and located throughout tongue. No recent procedures/liver biopsy done. Patient also states for the past 1 week has had intractable hiccups which cause anxiety.     Patient hemodynamically stable. Labs concerning for new leukocytosis 17.9, normocytic anemia 12.6 ammonia 50, low Hct 32.2, elevated PT 16.6, INR 1.31, Troponin, lipase, U/A, BNP, CXR wnl    CT A/P with IV contrast done and shows upper abdominal fluid collection likely a hematoma, surrounds the liver, stomach, and spleen. Origin is not identified however may represent a variceal or hepatic hematoma. No active extravasation identified. Collection measures greater than 21.7 cm in transverse diameter and largest single pocket measures approx 7.5cm in maximal AP dimension.      Overview/Hospital Course:  Procal 0.59; blood cultures show NGTD. CT triple phase shows loculated ascites and no visualized liver  lesion. IR drainage of presumed intra-abdominal abscess 8/4/2023; pending cxs. Cytology reveals elevated WBC at 8030; aerobic cx negative, gram stain negative. ID consulted and recommended repeating cell count with continued abx. Repeat abdominal fluid cytology shows WBC increased to 11,000. CT obtained, pending results and IR recs. Repeat CT on 8/8 shows slight improvement in perihepatic fluid and mild increased free abdominopelvic fluid; d/c'd metronidazole and started zosyn. IR drain exchange took place 8/9, repeated cx's obtained. MRI 8/9 shows peripherally enhancing loculated fluid collection surrounding the left hepatic lobe with drainage catheter in place with no obvious connection to the biliary system. Differential includes abscess versus loculated ascites. WBC improved with pip/tazo.      Interval History: patient anxious to leave, not hiccuping    Review of Systems   Constitutional:  Negative for chills and fever.   Respiratory:  Negative for cough and shortness of breath.    Cardiovascular:  Negative for chest pain.   Gastrointestinal:  Positive for abdominal pain. Negative for diarrhea, nausea and vomiting.   Musculoskeletal:  Negative for gait problem.   Skin:  Positive for rash.   Psychiatric/Behavioral:  The patient is nervous/anxious.      Objective:     Vital Signs (Most Recent):  Temp: 98.2 °F (36.8 °C) (08/11/23 0710)  Pulse: 88 (08/11/23 0710)  Resp: 17 (08/11/23 0710)  BP: (!) 121/56 (08/11/23 0710)  SpO2: (!) 94 % (08/11/23 0710) Vital Signs (24h Range):  Temp:  [96.4 °F (35.8 °C)-98.6 °F (37 °C)] 98.2 °F (36.8 °C)  Pulse:  [79-88] 88  Resp:  [17-20] 17  SpO2:  [92 %-98 %] 94 %  BP: (105-136)/(56-76) 121/56     Weight: 92.3 kg (203 lb 7.8 oz)  Body mass index is 28.38 kg/m².    Intake/Output Summary (Last 24 hours) at 8/11/2023 1124  Last data filed at 8/11/2023 0919  Gross per 24 hour   Intake 500 ml   Output 21.5 ml   Net 478.5 ml         Physical Exam  Vitals and nursing note reviewed.    Constitutional:       General: He is not in acute distress.     Appearance: Normal appearance.   HENT:      Head: Normocephalic and atraumatic.   Eyes:      Extraocular Movements: Extraocular movements intact.      Pupils: Pupils are equal, round, and reactive to light.   Cardiovascular:      Rate and Rhythm: Normal rate.   Pulmonary:      Effort: Pulmonary effort is normal. No respiratory distress.   Abdominal:      General: There is distension.   Musculoskeletal:      Cervical back: Normal range of motion and neck supple.      Right lower leg: No edema.      Left lower leg: No edema.      Comments: IR drain in place   Skin:     General: Skin is warm and dry.      Findings: Rash present.   Neurological:      Mental Status: He is alert and oriented to person, place, and time.   Psychiatric:         Mood and Affect: Mood normal.         Behavior: Behavior normal.             Significant Labs: All pertinent labs within the past 24 hours have been reviewed.    Significant Imaging: I have reviewed all pertinent imaging results/findings within the past 24 hours.      Assessment/Plan:      * Intra-abdominal abscess  Mr. Page is a 53 YOM with cirrhosis 2/2 to EtOH use and HCV, esophageal varices, GIB, HTN, hypothyroidism, MDD, hydrocephalous, cholelithiasis, who presented to OSH for abdominal pain and is coming to [facility] for IR evaluation of intra-thoracic hematoma and hepatology evaluation. Presented for abdominal pain which has now resolved. CT A/P with IV contrast done and shows upper abdominal fluid collection likely a hematoma, surrounds the liver, stomach, and spleen. Origin is not identified however may represent a variceal or hepatic hematoma. No active extravasation identified. Patient transferred for IR and hepatology evaluation. History of past varices ligation. Has 1.1 cm hypoattenuating lesion in left hepatic lobe.     CT triple phase: loculated ascites; no mass lesions of the liver identified, though  "arterial phase not assessed.  Ascites cultures: negative gram stain and aerobic culture.  WBC 8030 > 49586.    Repeat CT: slight improvement in perihepatic fluid; mild increased free abdominopelvic fluid  8/8: d/c'd metronidazole and started zosyn  8/9: drain exchange per IR, repeat cx's    PLAN:  IR consulted, appreciate recs  - monitor drain output  - Flush drain   - follow up cultures  ID consulted, appreciate recs  - broad abx with Zosyn    Hypocalcemia    Recent Labs   Lab 08/11/23  0302   CALCIUM 8.0*       No results for input(s): "CAION" in the last 24 hours.      LUISA (iron deficiency anemia)  Transfuse hgb greater than 7      Hypokalemia  Repletion daily      Tongue lesion  Patient with tongue swelling that started day of admission. White and green lesions on exam, looks dry. Dysphagia with dry tongue otherwise able to swallow. No hoarseness    Improved with Nystatin mouthwash    - SLP consulted  - Nystatin QID    Leukocytosis  RESOLVED    Patient with leukocytosis that was present at OSH at 17.9 and downtrending to 15.24. patient otherwise is hemodynamically stable. Low concern for sepsis at this time however high chance for decompensation given presence of hematoma    Unclear etiology consider infected hematoma vs stress    - continue to monitor     HTN (hypertension)  Patient normotensive with hematoma present. Given risk for possible hemorrhagic shock will hold home BP meds     - hold home amlodipine 5   - hold home HCTZ 25     Gallstones  Cholelithiasis without cystitis   Seen on imaging 8/2/23    - monitor ALP and bili    Hiccups  Patient with new hiccups for one week while at OSH. possibly 2/2 from loculated ascites causing irritation to phrenic nerve     Improved with Thorazine. Significantly better now.    PLAN:  - Thorazine PRN  -baclofen prn breakthrough    HCV (hepatitis C virus)  Per OSH records patient with history HCV          MDD (major depressive disorder)  - continue home sertraline 50 mg "   - continue buspirone 7.5 mg BID    Cirrhosis    MELD 3.0: 10 at 8/11/2023  3:02 AM  MELD-Na: 8 at 8/11/2023  3:02 AM  Calculated from:  Serum Creatinine: 0.6 mg/dL (Using min of 1 mg/dL) at 8/11/2023  3:02 AM  Serum Sodium: 140 mmol/L (Using max of 137 mmol/L) at 8/11/2023  3:02 AM  Total Bilirubin: 0.8 mg/dL (Using min of 1 mg/dL) at 8/11/2023  3:02 AM  Serum Albumin: 2.1 g/dL at 8/11/2023  3:02 AM  INR(ratio): 1.2 at 8/11/2023  3:02 AM  Age at listing (hypothetical): 53 years  Sex: Male at 8/11/2023  3:02 AM    Ammonia 67, concern for hepatic encephalopathy 2/2 report of one episode of hallucinations per wife. No subsequent episodes.    PLAN:  - hepatology consulted, recs appreciated   - Referred to outpatient hepatologist  - patient on nadolol at OSH, started on propranolol this admission  - continue pantoprazole 40 mg from OSH   - delirium precautions  - lactulose    Hypothyroidism  - continue home synthroid 75 mcg    Normocytic anemia  Patient presents with anemia 12.1 mcv 87 (normocytic anemia 12.6 at OSH). No known baseline hemoglobin. Patient with new hematoma formation.     Iron panel: iron 26, TIBC 250, transferrin 169, Ferritin 213.     8/8: thrombocytes 94, lovenox held  8/9: plts 108      VTE Risk Mitigation (From admission, onward)         Ordered     enoxaparin injection 40 mg  Daily         08/07/23 1005     IP VTE LOW RISK PATIENT  Once         08/07/23 1005     Place sequential compression device  Until discontinued         08/03/23 0108                Discharge Planning   DONIS: 8/14/2023     Code Status: Full Code   Is the patient medically ready for discharge?: No    Reason for patient still in hospital (select all that apply): Treatment  Discharge Plan A: Home with family                  Cristian Worley MD  Department of Hospital Medicine   [facility] - Surgery

## 2023-08-11 NOTE — CONSULTS
"Interventional Radiology  Consult Note    Consult Requested By: Cristian Worley MD   Reason for Consult: Drain management     SUBJECTIVE:     Chief Complaint:  drain with decreased output    History of Present Illness:  Gregg Page is a 53 y.o. male with a PMHx of cirrhosis 2/2 to EtOH use and HCV, esophageal varices, GIB, HTN, hypothyroidism, MDD, hydrocephalous, cholelithiasis who initially presented to OSH for abdominal pain, was found to have possible "intra-thoracic hematoma" surrounding liver on imaging and was transferred to [facility] on 8/3/23 for IR and hepatology eval. Hospital course notable for IR percutaneous drain placement into the perihepatic collection on 8/4/23, ID following as cytology concerning for abscess but cultures NGTD, s/p IR drain exchange on 8/9/23. IR has been re-consulted for drain management. Per primary team, drain with very little output since it was exchanged. Output 30 cc on 8/10/23, 17.5 cc output overnight. Drain has not been getting flushed. MRI abdomen on 8/9/23 revealed "Peripherally enhancing thick-walled T2 hyperintense loculated fluid collection with percutaneous catheter in place centered along the anterior and left lateral aspects along the left hepatic lobe with components communicating posteriorly along the anterior aspect of the spleen and additional anterior inferior tubular component extending along the stomach and anterior aspect of the duodenum". His last WBC 12.51 down from 13.07. His drain fluid cultures and blood cultures remain NGTD.     Review of Systems   Constitutional:  Negative for chills, fever, malaise/fatigue and weight loss.   Respiratory: Negative.     Cardiovascular: Negative.    Gastrointestinal:  Negative for abdominal pain, constipation, diarrhea, nausea and vomiting.   Genitourinary: Negative.    Musculoskeletal: Negative.    Neurological:  Negative for seizures, loss of consciousness and weakness.       Scheduled Meds:   busPIRone  7.5 mg Oral BID "    enoxparin  40 mg Subcutaneous Daily    lactulose  5 g Oral Daily    levothyroxine  75 mcg Oral Before breakfast    nystatin  500,000 Units Oral QID    pantoprazole  40 mg Oral Daily    piperacillin-tazobactam (Zosyn) IV (PEDS and ADULTS) (extended infusion is not appropriate)  4.5 g Intravenous Q8H    propranoloL  10 mg Oral BID    sertraline  25 mg Oral Daily    sodium chloride 0.9%  10 mL Intravenous BID     Continuous Infusions:  PRN Meds:(Magic mouthwash) 1:1:1 diphenhydrAMINE(Benadryl) 12.5mg/5ml liq, aluminum & magnesium hydroxide-simethicone (Maalox), LIDOcaine viscous 2%, baclofen, chlorproMAZINE, dextrose 10%, dextrose 10%, glucagon (human recombinant), glucose, glucose, naloxone, sodium chloride 0.9%    Review of patient's allergies indicates:  No Known Allergies    No past medical history on file.  No past surgical history on file.  No family history on file.       OBJECTIVE:     Vital Signs (Most Recent)  Temp: 98.2 °F (36.8 °C) (08/11/23 0710)  Pulse: 88 (08/11/23 0710)  Resp: 17 (08/11/23 0710)  BP: (!) 121/56 (08/11/23 0710)  SpO2: (!) 94 % (08/11/23 0710)    Physical Exam:  Physical Exam  Vitals and nursing note reviewed.   Constitutional:       General: He is not in acute distress.     Appearance: He is not ill-appearing.   HENT:      Head: Normocephalic and atraumatic.   Eyes:      Extraocular Movements: Extraocular movements intact.      Conjunctiva/sclera: Conjunctivae normal.      Pupils: Pupils are equal, round, and reactive to light.   Pulmonary:      Effort: Pulmonary effort is normal. No respiratory distress.   Abdominal:      General: There is distension.      Tenderness: There is no abdominal tenderness.      Comments: RUQ drain in place without signs of retraction, pericatheter leakage or surrounding hematoma/cellulitis/abscess. Drain flushed at bedside with 10 cc NS without resistance   Skin:     General: Skin is warm and dry.      Coloration: Skin is not jaundiced.      Comments:  Erythematous plaque-like rash on abdomen and legs   Neurological:      General: No focal deficit present.      Mental Status: He is alert and oriented to person, place, and time.   Psychiatric:         Mood and Affect: Mood normal.         Behavior: Behavior normal.         Thought Content: Thought content normal.         Judgment: Judgment normal.         Laboratory  I have reviewed all pertinent lab results within the past 24 hours.  CBC:   Recent Labs   Lab 08/11/23 0302   WBC 12.51   RBC 4.03*   HGB 11.4*   HCT 35.7*   *   MCV 89   MCH 28.3   MCHC 31.9*     BMP:   Recent Labs   Lab 08/11/23 0302   GLU 98      K 3.8      CO2 23   BUN 6   CREATININE 0.6   CALCIUM 8.0*   MG 1.8     CMP:   Recent Labs   Lab 08/11/23 0302   GLU 98   CALCIUM 8.0*   ALBUMIN 2.1*   PROT 5.4*      K 3.8   CO2 23      BUN 6   CREATININE 0.6   ALKPHOS 101   ALT 13   AST 22   BILITOT 0.8     LFTs:   Recent Labs   Lab 08/11/23 0302   ALT 13   AST 22   ALKPHOS 101   BILITOT 0.8   PROT 5.4*   ALBUMIN 2.1*     Coagulation:   Recent Labs   Lab 08/11/23 0302   LABPROT 13.0*   INR 1.2     Microbiology Results (last 7 days)       Procedure Component Value Units Date/Time    Culture, Anaerobe [[id number]] Collected: 08/04/23 1409    Order Status: Completed Specimen: Abscess from Abdomen Updated: 08/11/23 1000     Anaerobic Culture No anaerobes isolated    Fungus culture [[id number]] Collected: 08/04/23 1409    Order Status: Completed Specimen: Abscess from Abdomen Updated: 08/10/23 0752     Fungus (Mycology) Culture Culture in progress    Culture, Anaerobe [[id number]] Collected: 08/09/23 0811    Order Status: Completed Specimen: Body Fluid from Abdomen Updated: 08/10/23 0746     Anaerobic Culture Culture in progress    Aerobic culture [[id number]] Collected: 08/09/23 0811    Order Status: Completed Specimen: Peritoneal Fluid from Abdomen Updated: 08/10/23 0716     Aerobic Bacterial Culture No growth    Blood culture  [[id number]] Collected: 08/03/23 1422    Order Status: Completed Specimen: Blood Updated: 08/08/23 1612     Blood Culture, Routine No growth after 5 days.    Blood culture [[id number]] Collected: 08/03/23 1424    Order Status: Completed Specimen: Blood Updated: 08/08/23 1612     Blood Culture, Routine No growth after 5 days.    Aerobic culture [[id number]] Collected: 08/04/23 1409    Order Status: Completed Specimen: Abscess from Abdomen Updated: 08/07/23 0732     Aerobic Bacterial Culture No growth    Gram stain [[id number]] Collected: 08/04/23 1409    Order Status: Completed Specimen: Abscess from Abdomen Updated: 08/04/23 1846     Gram Stain Result Rare WBC's      No organisms seen            ASA/Mallampati  ASA: 2  Mallampati: 2    Imaging:  Recent imaging studies including MRI abdomen on 8/9/23     EXAMINATION:  MRI ABDOMEN WITH AND WO_INC MRCP (XPD)     CLINICAL HISTORY:  loculated ascites;     TECHNIQUE:  Multiplanar multisequence images of the abdomen pre and post 10 mL intravenous Gadavist IV contrast.  Additional 3D and thick slab fluid sensitive MRCP sequences were obtained with maximum intensity projection images for evaluation of the biliary system.     COMPARISON:  CT abdomen pelvis 08/08/2023     FINDINGS:  Inferior Thorax: Small left pleural effusion and associated left basilar atelectasis.     Liver: Cirrhotic liver morphology.  No focal suspicious enhancing lesion.  Recanalized umbilical vein.  Portal veins are patent.     Gallbladder: 0.8 cm stone near the neck.  No significant wall thickening.     Bile Ducts: No biliary ductal dilatation.  MRCP sequences significant degraded by artifact and essentially nondiagnostic, possibly related to standing wave or dielectric artifact.     Pancreas: No ductal dilatation, mass or peripancreatic fat stranding.     Spleen: Enlarged measuring 22.4 cm.  T1 low signal nonenhancing subcentimeter lesion the spleen, favoring benign etiology such as a siderotic nodule.      Adrenals: Unremarkable.     Kidneys: Small left renal cyst.  No solid enhancing mass.  No hydronephrosis.     Bowel/Mesentery (imaged portion): No evidence of bowel obstruction.     Peritoneal cavity: Small/moderate volume ascites.  Peripherally enhancing thick-walled T2 hyperintense loculated fluid collection with percutaneous catheter in place centered along the anterior and left lateral aspects along the left hepatic lobe with components communicating posteriorly along the anterior aspect of the spleen and additional anterior inferior tubular component extending along the stomach and anterior aspect of the duodenum.  The dominant component anterior to the left hepatic lobe measures 3.6 x 9.4 x 3.7 cm and another dominant laterally adjacent to the left hepatic lobe measures 8.1 x 7.7 x 9.4 cm.  No obvious connection to the biliary system.  Internal loculations demonstrate mild restricted diffusion.  No suspicious internal enhancement.     Retroperitoneum: No significant adenopathy.     Vasculature: Abdominal aorta is normal caliber.  Extensive prominent upper abdominal collateral vessels most notably in the left upper quadrant and prominent gastroesophageal varices.     Abdominal wall:  Mild/moderate generalized body wall edema. Ventral abdominal wall collateral vessels.     Bones: Normal marrow.  Multilevel endplate degenerative changes of the lumbar spine.     Impression:     1. Peripherally enhancing loculated fluid collection surrounding the left hepatic lobe with drainage catheter in place.  No obvious connection to the biliary system.  Differential includes abscess versus loculated ascites.  Recommend correlation with drainage fluid analysis/cultures.  2. Cirrhotic liver morphology without focal suspicious enhancing lesion.  3. MRCP sequences are degraded by artifact and are essentially nondiagnostic.  No biliary or pancreatic ductal dilatation.  4. Sequelae of portal hypertension including splenomegaly,  extensive upper abdominal collateral vessels including gastroesophageal varices, and small/mild volume ascites.  5. Cholelithiasis.  6. Small volume left pleural effusion.     Electronically signed by resident: Juan Moore  Date:                                            08/10/2023  Time:                                           10:45     Electronically signed by: Zaki Melendez  Date:                                            08/10/2023  Time:                                           12:49  ASSESSMENT/PLAN:     Assessment:  53 y.o. male with a PMHx of cirrhosis 2/2 to EtOH use and HCV c/b perihepatic abscess s/p IR drain placement on 8/4 (last exchanged on 8/9) who has been referred to IR for drain eval. Fluid also appears very complex and thick on imaging. Unable to upsize the drain as the pt currently has a 16 Fr drain in place which is the largest size drain in the IR dept. Drain assessed at bedside- appears in good position without evidence of retraction or pericatheter leakage. Drain flushed at bedside by this writer with no resistance. Drain appears to be functioning normally, therefore no indication for IR intervention at this time. If still concerned about drain output, recommend obtaining CT a/p. Plan discussed with ordering physician.The pt verbalized understanding of the plan and would like to proceed.    Plan:  Drain appears in good position and is functioning well  Flush drain with 10 cc NS qshift, do not draw back  Record and measure drain output q6h  Fluid cultures NGTD, ID following  Consider drain removal when output < 20 cc for 3 consecutive days. Also consider if repeat imaging is needed prior to drain removal- defer timing and necessity of repeat imaging to ID  If pt discharges home with drain in place, plan for follow up in IR clinic for drain removal. Please contact IR department prior to discharge if this may be a possibility. Scheduling information given to pt  IR will sign off at this  time. Please contact with questions via [employer] secure chat.    Tanya Graff PA-C  Interventional Radiology  Spectra: [phone]

## 2023-08-11 NOTE — ASSESSMENT & PLAN NOTE
RESOLVED    Patient with leukocytosis that was present at OSH at 17.9 and downtrending to 15.24. patient otherwise is hemodynamically stable. Low concern for sepsis at this time however high chance for decompensation given presence of hematoma    Unclear etiology consider infected hematoma vs stress    - continue to monitor

## 2023-08-11 NOTE — SUBJECTIVE & OBJECTIVE
Interval History: patient anxious to leave, not hiccuping    Review of Systems   Constitutional:  Negative for chills and fever.   Respiratory:  Negative for cough and shortness of breath.    Cardiovascular:  Negative for chest pain.   Gastrointestinal:  Positive for abdominal pain. Negative for diarrhea, nausea and vomiting.   Musculoskeletal:  Negative for gait problem.   Skin:  Positive for rash.   Psychiatric/Behavioral:  The patient is nervous/anxious.      Objective:     Vital Signs (Most Recent):  Temp: 98.2 °F (36.8 °C) (08/11/23 0710)  Pulse: 88 (08/11/23 0710)  Resp: 17 (08/11/23 0710)  BP: (!) 121/56 (08/11/23 0710)  SpO2: (!) 94 % (08/11/23 0710) Vital Signs (24h Range):  Temp:  [96.4 °F (35.8 °C)-98.6 °F (37 °C)] 98.2 °F (36.8 °C)  Pulse:  [79-88] 88  Resp:  [17-20] 17  SpO2:  [92 %-98 %] 94 %  BP: (105-136)/(56-76) 121/56     Weight: 92.3 kg (203 lb 7.8 oz)  Body mass index is 28.38 kg/m².    Intake/Output Summary (Last 24 hours) at 8/11/2023 1124  Last data filed at 8/11/2023 0919  Gross per 24 hour   Intake 500 ml   Output 21.5 ml   Net 478.5 ml         Physical Exam  Vitals and nursing note reviewed.   Constitutional:       General: He is not in acute distress.     Appearance: Normal appearance.   HENT:      Head: Normocephalic and atraumatic.   Eyes:      Extraocular Movements: Extraocular movements intact.      Pupils: Pupils are equal, round, and reactive to light.   Cardiovascular:      Rate and Rhythm: Normal rate.   Pulmonary:      Effort: Pulmonary effort is normal. No respiratory distress.   Abdominal:      General: There is distension.   Musculoskeletal:      Cervical back: Normal range of motion and neck supple.      Right lower leg: No edema.      Left lower leg: No edema.      Comments: IR drain in place   Skin:     General: Skin is warm and dry.      Findings: Rash present.   Neurological:      Mental Status: He is alert and oriented to person, place, and time.   Psychiatric:          Mood and Affect: Mood normal.         Behavior: Behavior normal.             Significant Labs: All pertinent labs within the past 24 hours have been reviewed.    Significant Imaging: I have reviewed all pertinent imaging results/findings within the past 24 hours.

## 2023-08-11 NOTE — ASSESSMENT & PLAN NOTE
Mr. Page is a 53 YOM with cirrhosis 2/2 to EtOH use and HCV, esophageal varices, GIB, HTN, hypothyroidism, MDD, hydrocephalous, cholelithiasis, who presented to OSH for abdominal pain and is [place] [facility] evaluation of intra-thoracic hematoma and hepatology evaluation. Presented for abdominal pain which has now resolved. CT A/P with IV contrast done and shows upper abdominal fluid collection likely a hematoma, surrounds the liver, stomach, and spleen. Origin is not identified however may represent a variceal or hepatic hematoma. No active extravasation identified. Patient transferred for IR and hepatology evaluation. History of past varices ligation. Has 1.1 cm hypoattenuating lesion in left hepatic lobe.     CT triple phase: loculated ascites; no mass lesions of the liver identified, though arterial phase not assessed.  Ascites cultures: negative gram stain and aerobic culture.  WBC 8030 > 95017.    Repeat CT: slight improvement in perihepatic fluid; mild increased free abdominopelvic fluid  8/8: d/c'd metronidazole and started zosyn  8/9: drain exchange per IR, repeat cx's    PLAN:  IR consulted, appreciate recs  - monitor drain output  - Flush drain   - follow up cultures  ID consulted, appreciate recs  - broad abx with Zosyn

## 2023-08-11 NOTE — ASSESSMENT & PLAN NOTE
"  Recent Labs   Lab 08/11/23  0302   CALCIUM 8.0*       No results for input(s): "CHERI" in the last 24 hours.    "

## 2023-08-11 NOTE — ASSESSMENT & PLAN NOTE
MELD 3.0: 10 at 8/11/2023  3:02 AM  MELD-Na: 8 at 8/11/2023  3:02 AM  Calculated from:  Serum Creatinine: 0.6 mg/dL (Using min of 1 mg/dL) at 8/11/2023  3:02 AM  Serum Sodium: 140 mmol/L (Using max of 137 mmol/L) at 8/11/2023  3:02 AM  Total Bilirubin: 0.8 mg/dL (Using min of 1 mg/dL) at 8/11/2023  3:02 AM  Serum Albumin: 2.1 g/dL at 8/11/2023  3:02 AM  INR(ratio): 1.2 at 8/11/2023  3:02 AM  Age at listing (hypothetical): 53 years  Sex: Male at 8/11/2023  3:02 AM    Ammonia 67, concern for hepatic encephalopathy 2/2 report of one episode of hallucinations per wife. No subsequent episodes.    PLAN:  - hepatology consulted, recs appreciated   - Referred to outpatient hepatologist  - patient on nadolol at OSH, started on propranolol this admission  - continue pantoprazole 40 mg from OSH   - delirium precautions  - lactulose

## 2023-08-12 PROBLEM — K14.8 TONGUE LESION: Status: RESOLVED | Noted: 2023-08-03 | Resolved: 2023-08-12

## 2023-08-12 LAB
ALBUMIN SERPL BCP-MCNC: 2.3 G/DL (ref 3.5–5.2)
ALP SERPL-CCNC: 96 U/L (ref 55–135)
ALT SERPL W/O P-5'-P-CCNC: 14 U/L (ref 10–44)
ANION GAP SERPL CALC-SCNC: 8 MMOL/L (ref 8–16)
AST SERPL-CCNC: 24 U/L (ref 10–40)
BACTERIA SPEC AEROBE CULT: NO GROWTH
BASOPHILS # BLD AUTO: 0.08 K/UL (ref 0–0.2)
BASOPHILS NFR BLD: 0.7 % (ref 0–1.9)
BILIRUB SERPL-MCNC: 0.9 MG/DL (ref 0.1–1)
BUN SERPL-MCNC: 5 MG/DL (ref 6–20)
CALCIUM SERPL-MCNC: 8.2 MG/DL (ref 8.7–10.5)
CHLORIDE SERPL-SCNC: 108 MMOL/L (ref 95–110)
CO2 SERPL-SCNC: 24 MMOL/L (ref 23–29)
CREAT SERPL-MCNC: 0.6 MG/DL (ref 0.5–1.4)
DIFFERENTIAL METHOD: ABNORMAL
EOSINOPHIL # BLD AUTO: 0.3 K/UL (ref 0–0.5)
EOSINOPHIL NFR BLD: 2.9 % (ref 0–8)
ERYTHROCYTE [DISTWIDTH] IN BLOOD BY AUTOMATED COUNT: 15.8 % (ref 11.5–14.5)
EST. GFR  (NO RACE VARIABLE): >60 ML/MIN/1.73 M^2
GLUCOSE SERPL-MCNC: 88 MG/DL (ref 70–110)
HCT VFR BLD AUTO: 39 % (ref 40–54)
HGB BLD-MCNC: 12.1 G/DL (ref 14–18)
IMM GRANULOCYTES # BLD AUTO: 0.03 K/UL (ref 0–0.04)
IMM GRANULOCYTES NFR BLD AUTO: 0.3 % (ref 0–0.5)
INR PPP: 1.3 (ref 0.8–1.2)
LYMPHOCYTES # BLD AUTO: 1.6 K/UL (ref 1–4.8)
LYMPHOCYTES NFR BLD: 13.8 % (ref 18–48)
MAGNESIUM SERPL-MCNC: 1.8 MG/DL (ref 1.6–2.6)
MCH RBC QN AUTO: 28.3 PG (ref 27–31)
MCHC RBC AUTO-ENTMCNC: 31 G/DL (ref 32–36)
MCV RBC AUTO: 91 FL (ref 82–98)
MONOCYTES # BLD AUTO: 1.6 K/UL (ref 0.3–1)
MONOCYTES NFR BLD: 13.6 % (ref 4–15)
NEUTROPHILS # BLD AUTO: 7.9 K/UL (ref 1.8–7.7)
NEUTROPHILS NFR BLD: 68.7 % (ref 38–73)
NRBC BLD-RTO: 0 /100 WBC
PHOSPHATE SERPL-MCNC: 3.2 MG/DL (ref 2.7–4.5)
PLATELET # BLD AUTO: 148 K/UL (ref 150–450)
PMV BLD AUTO: 11.2 FL (ref 9.2–12.9)
POTASSIUM SERPL-SCNC: 3.8 MMOL/L (ref 3.5–5.1)
PROT SERPL-MCNC: 5.9 G/DL (ref 6–8.4)
PROTHROMBIN TIME: 13.5 SEC (ref 9–12.5)
RBC # BLD AUTO: 4.27 M/UL (ref 4.6–6.2)
SODIUM SERPL-SCNC: 140 MMOL/L (ref 136–145)
WBC # BLD AUTO: 11.45 K/UL (ref 3.9–12.7)

## 2023-08-12 PROCEDURE — A4216 STERILE WATER/SALINE, 10 ML: HCPCS | Performed by: STUDENT IN AN ORGANIZED HEALTH CARE EDUCATION/TRAINING PROGRAM

## 2023-08-12 PROCEDURE — 85025 COMPLETE CBC W/AUTO DIFF WBC: CPT

## 2023-08-12 PROCEDURE — 85610 PROTHROMBIN TIME: CPT | Performed by: STUDENT IN AN ORGANIZED HEALTH CARE EDUCATION/TRAINING PROGRAM

## 2023-08-12 PROCEDURE — 36415 COLL VENOUS BLD VENIPUNCTURE: CPT | Performed by: STUDENT IN AN ORGANIZED HEALTH CARE EDUCATION/TRAINING PROGRAM

## 2023-08-12 PROCEDURE — 80053 COMPREHEN METABOLIC PANEL: CPT

## 2023-08-12 PROCEDURE — 25000003 PHARM REV CODE 250: Performed by: STUDENT IN AN ORGANIZED HEALTH CARE EDUCATION/TRAINING PROGRAM

## 2023-08-12 PROCEDURE — 84100 ASSAY OF PHOSPHORUS: CPT

## 2023-08-12 PROCEDURE — 99232 SBSQ HOSP IP/OBS MODERATE 35: CPT | Mod: ,,, | Performed by: STUDENT IN AN ORGANIZED HEALTH CARE EDUCATION/TRAINING PROGRAM

## 2023-08-12 PROCEDURE — 11000001 HC ACUTE MED/SURG PRIVATE ROOM

## 2023-08-12 PROCEDURE — 99232 PR SUBSEQUENT HOSPITAL CARE,LEVL II: ICD-10-PCS | Mod: ,,, | Performed by: STUDENT IN AN ORGANIZED HEALTH CARE EDUCATION/TRAINING PROGRAM

## 2023-08-12 PROCEDURE — 63600175 PHARM REV CODE 636 W HCPCS: Performed by: STUDENT IN AN ORGANIZED HEALTH CARE EDUCATION/TRAINING PROGRAM

## 2023-08-12 PROCEDURE — 83735 ASSAY OF MAGNESIUM: CPT

## 2023-08-12 PROCEDURE — 25000003 PHARM REV CODE 250

## 2023-08-12 RX ADMIN — PIPERACILLIN SODIUM AND TAZOBACTAM SODIUM 4.5 G: 4; .5 INJECTION, POWDER, FOR SOLUTION INTRAVENOUS at 05:08

## 2023-08-12 RX ADMIN — PANTOPRAZOLE SODIUM 40 MG: 40 TABLET, DELAYED RELEASE ORAL at 09:08

## 2023-08-12 RX ADMIN — BUSPIRONE HYDROCHLORIDE 7.5 MG: 5 TABLET ORAL at 09:08

## 2023-08-12 RX ADMIN — BUSPIRONE HYDROCHLORIDE 7.5 MG: 5 TABLET ORAL at 08:08

## 2023-08-12 RX ADMIN — PIPERACILLIN SODIUM AND TAZOBACTAM SODIUM 4.5 G: 4; .5 INJECTION, POWDER, FOR SOLUTION INTRAVENOUS at 02:08

## 2023-08-12 RX ADMIN — LEVOTHYROXINE SODIUM 75 MCG: 75 TABLET ORAL at 05:08

## 2023-08-12 RX ADMIN — ENOXAPARIN SODIUM 40 MG: 40 INJECTION SUBCUTANEOUS at 05:08

## 2023-08-12 RX ADMIN — SERTRALINE HYDROCHLORIDE 25 MG: 25 TABLET ORAL at 09:08

## 2023-08-12 RX ADMIN — Medication 10 ML: at 09:08

## 2023-08-12 RX ADMIN — Medication 10 ML: at 11:08

## 2023-08-12 NOTE — ASSESSMENT & PLAN NOTE
MELD 3.0: 11 at 8/12/2023  3:35 AM  MELD-Na: 9 at 8/12/2023  3:35 AM  Calculated from:  Serum Creatinine: 0.6 mg/dL (Using min of 1 mg/dL) at 8/12/2023  3:35 AM  Serum Sodium: 140 mmol/L (Using max of 137 mmol/L) at 8/12/2023  3:35 AM  Total Bilirubin: 0.9 mg/dL (Using min of 1 mg/dL) at 8/12/2023  3:35 AM  Serum Albumin: 2.3 g/dL at 8/12/2023  3:35 AM  INR(ratio): 1.3 at 8/12/2023  3:35 AM  Age at listing (hypothetical): 53 years  Sex: Male at 8/12/2023  3:35 AM    Ammonia 67, concern for hepatic encephalopathy 2/2 report of one episode of hallucinations per wife. No subsequent episodes.    PLAN:  - Referred to outpatient hepatologist  - patient on nadolol at OSH, started on propranolol this admission  - continue pantoprazole 40 mg from OSH   - delirium precautions  - lactulose

## 2023-08-12 NOTE — SUBJECTIVE & OBJECTIVE
Interval History: NAEO. Feeling well without new concerns.    Review of Systems   Constitutional:  Negative for chills and fever.   Respiratory:  Negative for cough and shortness of breath.    Cardiovascular:  Negative for chest pain.   Gastrointestinal:  Positive for abdominal pain and diarrhea. Negative for constipation, nausea and vomiting.   Musculoskeletal:  Negative for gait problem.   Skin:  Positive for rash.     Objective:     Vital Signs (Most Recent):  Temp: 97.6 °F (36.4 °C) (08/12/23 1114)  Pulse: 76 (08/12/23 1114)  Resp: 14 (08/12/23 1114)  BP: (!) 107/59 (08/12/23 1114)  SpO2: (!) 92 % (08/12/23 1114) Vital Signs (24h Range):  Temp:  [96.3 °F (35.7 °C)-98 °F (36.7 °C)] 97.6 °F (36.4 °C)  Pulse:  [72-78] 76  Resp:  [14-18] 14  SpO2:  [90 %-96 %] 92 %  BP: (102-132)/(52-88) 107/59     Weight: 92.3 kg (203 lb 7.8 oz)  Body mass index is 28.38 kg/m².    Intake/Output Summary (Last 24 hours) at 8/12/2023 1227  Last data filed at 8/12/2023 1115  Gross per 24 hour   Intake 700 ml   Output 42.5 ml   Net 657.5 ml         Physical Exam  Vitals and nursing note reviewed.   Constitutional:       General: He is not in acute distress.     Appearance: Normal appearance. He is not toxic-appearing.   HENT:      Head: Normocephalic and atraumatic.   Eyes:      Extraocular Movements: Extraocular movements intact.      Pupils: Pupils are equal, round, and reactive to light.   Cardiovascular:      Rate and Rhythm: Normal rate and regular rhythm.      Heart sounds: No murmur heard.  Pulmonary:      Effort: Pulmonary effort is normal. No respiratory distress.      Breath sounds: Normal breath sounds. No wheezing.   Abdominal:      General: There is distension.      Tenderness: There is no abdominal tenderness. There is no guarding.   Musculoskeletal:      Cervical back: Normal range of motion and neck supple.      Right lower leg: No edema.      Left lower leg: No edema.   Skin:     General: Skin is warm and dry.       Findings: Rash present.   Neurological:      Mental Status: He is alert and oriented to person, place, and time.   Psychiatric:         Mood and Affect: Mood normal.         Behavior: Behavior normal.             Significant Labs: All pertinent labs within the past 24 hours have been reviewed.    Significant Imaging: I have reviewed all pertinent imaging results/findings within the past 24 hours.

## 2023-08-12 NOTE — PROGRESS NOTES
[facility] Medicine  Progress Note    Patient Name: Gregg Page  MRN: [id number]  Patient Class: IP- Inpatient   Admission Date: 8/3/2023  Length of Stay: 9 days  Attending Physician: Oliverio Camacho, *  Primary Care Provider: No, Primary Doctor        Subjective:     Principal Problem:Intra-abdominal abscess        HPI:  Mr. Page is a 53 YOM with cirrhosis 2/2 to EtOH use and HCV, esophageal varices, GIB, HTN, hypothyroidism, MDD, hydrocephalous, cholelithiasis, who presented to OSH for abdominal pain and is coming to [facility] for IR evaluation of intra-thoracic hematoma and hepatology evaluation. Of note, patient was hospitalized 6/23 with similar presentation had MRCP done at that time (unavailable for review).  Abdominal pain radiates to the chest, onset 6 days prior to presentation at OSH. Patient denies melena, BRBPR, continued abdominal pain or CP. Patient states morning of transfer he started having tongue swelling and pain, feels dry, remitted with water, has dysphagia when his tongue is dry only, pain described as burning and located throughout tongue. No recent procedures/liver biopsy done. Patient also states for the past 1 week has had intractable hiccups which cause anxiety.     Patient hemodynamically stable. Labs concerning for new leukocytosis 17.9, normocytic anemia 12.6 ammonia 50, low Hct 32.2, elevated PT 16.6, INR 1.31, Troponin, lipase, U/A, BNP, CXR wnl    CT A/P with IV contrast done and shows upper abdominal fluid collection likely a hematoma, surrounds the liver, stomach, and spleen. Origin is not identified however may represent a variceal or hepatic hematoma. No active extravasation identified. Collection measures greater than 21.7 cm in transverse diameter and largest single pocket measures approx 7.5cm in maximal AP dimension.      Overview/Hospital Course:  Procal 0.59; blood cultures show NGTD. CT triple phase shows loculated ascites and no visualized liver  lesion. IR drainage of presumed intra-abdominal abscess 8/4/2023; pending cxs. Cytology reveals elevated WBC at 8030; aerobic cx negative, gram stain negative. ID consulted and recommended repeating cell count with continued abx. Repeat abdominal fluid cytology shows WBC increased to 11,000. CT obtained, pending results and IR recs. Repeat CT on 8/8 shows slight improvement in perihepatic fluid and mild increased free abdominopelvic fluid; d/c'd metronidazole and started zosyn. IR drain exchange took place 8/9, repeated cx's obtained. MRI 8/9 shows peripherally enhancing loculated fluid collection surrounding the left hepatic lobe with drainage catheter in place with no obvious connection to the biliary system. Differential includes abscess versus loculated ascites. WBC improved with pip/tazo.      Interval History: NAEO. Feeling well without new concerns.    Review of Systems   Constitutional:  Negative for chills and fever.   Respiratory:  Negative for cough and shortness of breath.    Cardiovascular:  Negative for chest pain.   Gastrointestinal:  Positive for abdominal pain and diarrhea. Negative for constipation, nausea and vomiting.   Musculoskeletal:  Negative for gait problem.   Skin:  Positive for rash.     Objective:     Vital Signs (Most Recent):  Temp: 97.6 °F (36.4 °C) (08/12/23 1114)  Pulse: 76 (08/12/23 1114)  Resp: 14 (08/12/23 1114)  BP: (!) 107/59 (08/12/23 1114)  SpO2: (!) 92 % (08/12/23 1114) Vital Signs (24h Range):  Temp:  [96.3 °F (35.7 °C)-98 °F (36.7 °C)] 97.6 °F (36.4 °C)  Pulse:  [72-78] 76  Resp:  [14-18] 14  SpO2:  [90 %-96 %] 92 %  BP: (102-132)/(52-88) 107/59     Weight: 92.3 kg (203 lb 7.8 oz)  Body mass index is 28.38 kg/m².    Intake/Output Summary (Last 24 hours) at 8/12/2023 1227  Last data filed at 8/12/2023 1115  Gross per 24 hour   Intake 700 ml   Output 42.5 ml   Net 657.5 ml         Physical Exam  Vitals and nursing note reviewed.   Constitutional:       General: He is not in  acute distress.     Appearance: Normal appearance. He is not toxic-appearing.   HENT:      Head: Normocephalic and atraumatic.   Eyes:      Extraocular Movements: Extraocular movements intact.      Pupils: Pupils are equal, round, and reactive to light.   Cardiovascular:      Rate and Rhythm: Normal rate and regular rhythm.      Heart sounds: No murmur heard.  Pulmonary:      Effort: Pulmonary effort is normal. No respiratory distress.      Breath sounds: Normal breath sounds. No wheezing.   Abdominal:      General: There is distension.      Tenderness: There is no abdominal tenderness. There is no guarding.   Musculoskeletal:      Cervical back: Normal range of motion and neck supple.      Right lower leg: No edema.      Left lower leg: No edema.   Skin:     General: Skin is warm and dry.      Findings: Rash present.   Neurological:      Mental Status: He is alert and oriented to person, place, and time.   Psychiatric:         Mood and Affect: Mood normal.         Behavior: Behavior normal.             Significant Labs: All pertinent labs within the past 24 hours have been reviewed.    Significant Imaging: I have reviewed all pertinent imaging results/findings within the past 24 hours.      Assessment/Plan:      * Intra-abdominal abscess  Mr. Page is a 53 YOM with cirrhosis 2/2 to EtOH use and HCV, esophageal varices, GIB, HTN, hypothyroidism, MDD, hydrocephalous, cholelithiasis, who presented to OSH for abdominal pain and is coming to [facility] for IR evaluation of intra-thoracic hematoma and hepatology evaluation. Presented for abdominal pain which has now resolved. CT A/P with IV contrast done and shows upper abdominal fluid collection likely a hematoma, surrounds the liver, stomach, and spleen. Origin is not identified however may represent a variceal or hepatic hematoma. No active extravasation identified. Patient transferred for IR and hepatology evaluation. History of past varices ligation. Has 1.1 cm  hypoattenuating lesion in left hepatic lobe.     CT triple phase: loculated ascites; no mass lesions of the liver identified, though arterial phase not assessed.  Ascites cultures: negative gram stain and aerobic culture.  WBC 8030 > 10062.    Repeat CT: slight improvement in perihepatic fluid; mild increased free abdominopelvic fluid  8/8: d/c'd metronidazole and started zosyn  8/9: drain exchange per IR, repeat cx's    PLAN:  IR consulted, appreciate recs  - monitor drain output  - Flush drain   - follow up cultures  - Will plan to repeat CT in about 2 weeks to reassess collection    ID consulted, appreciate recs   - IV piperacillin/tazobactam 13.5 grams IV q8 hours or 12 grams IV via continuous infusion daily, plan for four weeks of therapy for now, tentative EOT 09/07/2023        - If discharged to [facility], recommend ID consult on admission. If discharged to outside LTAC, will need ID consult if available    Cirrhosis    MELD 3.0: 11 at 8/12/2023  3:35 AM  MELD-Na: 9 at 8/12/2023  3:35 AM  Calculated from:  Serum Creatinine: 0.6 mg/dL (Using min of 1 mg/dL) at 8/12/2023  3:35 AM  Serum Sodium: 140 mmol/L (Using max of 137 mmol/L) at 8/12/2023  3:35 AM  Total Bilirubin: 0.9 mg/dL (Using min of 1 mg/dL) at 8/12/2023  3:35 AM  Serum Albumin: 2.3 g/dL at 8/12/2023  3:35 AM  INR(ratio): 1.3 at 8/12/2023  3:35 AM  Age at listing (hypothetical): 53 years  Sex: Male at 8/12/2023  3:35 AM    Ammonia 67, concern for hepatic encephalopathy 2/2 report of one episode of hallucinations per wife. No subsequent episodes.    PLAN:  - Referred to outpatient hepatologist  - patient on nadolol at OSH, started on propranolol this admission  - continue pantoprazole 40 mg from OSH   - delirium precautions  - lactulose    Normocytic anemia  Patient presents with anemia 12.1 mcv 87 (normocytic anemia 12.6 at OSH). No known baseline hemoglobin. Patient with new hematoma formation.     Iron panel: iron 26, TIBC 250, transferrin 169,  "Ferritin 213.     8/8: thrombocytes 94, lovenox held  8/9: plts 108    Hiccups  Patient with new hiccups for one week while at OSH. possibly 2/2 from loculated ascites causing irritation to phrenic nerve     Improved with Thorazine. Significantly better now.    PLAN:  - Thorazine PRN  -baclofen prn breakthrough    Hypocalcemia    Recent Labs   Lab 08/11/23  0302   CALCIUM 8.0*       No results for input(s): "CAION" in the last 24 hours.      LUISA (iron deficiency anemia)  Transfuse hgb greater than 7      Hypokalemia  Repletion daily      Leukocytosis  RESOLVED    Patient with leukocytosis that was present at OSH at 17.9 and downtrending to 15.24. patient otherwise is hemodynamically stable. Low concern for sepsis at this time however high chance for decompensation given presence of hematoma    Unclear etiology consider infected hematoma vs stress    - continue to monitor     HTN (hypertension)  Patient normotensive with hematoma present. Given risk for possible hemorrhagic shock will hold home BP meds     - hold home amlodipine 5   - hold home HCTZ 25     Gallstones  Cholelithiasis without cystitis   Seen on imaging 8/2/23    - monitor ALP and bili    HCV (hepatitis C virus)  Per OSH records patient with history HCV          MDD (major depressive disorder)  - continue home sertraline 50 mg   - continue buspirone 7.5 mg BID    Hypothyroidism  - continue home synthroid 75 mcg      VTE Risk Mitigation (From admission, onward)         Ordered     enoxaparin injection 40 mg  Daily         08/07/23 1005     IP VTE LOW RISK PATIENT  Once         08/07/23 1005     Place sequential compression device  Until discontinued         08/03/23 0108                Discharge Planning   DONIS: 8/14/2023     Code Status: Full Code   Is the patient medically ready for discharge?: Yes    Reason for patient still in hospital (select all that apply): Pending disposition  Discharge Plan A: Home with family                  Ailyn Duong, "   Department of Hospital Medicine   [facility] - Surgery

## 2023-08-12 NOTE — PLAN OF CARE
Patient AAOX4, call light and belongings in reach, siderails up x2. Surgical site c/d/I,  CATRACHITA drain output charted, pain controlled with current regimen.  Tolerating diet with no complaints of n/v, voiding with no concerns.  Remains free from falls and safety maintained.      Problem: Adult Inpatient Plan of Care  Goal: Plan of Care Review  Outcome: Ongoing, Progressing  Goal: Patient-Specific Goal (Individualized)  Outcome: Ongoing, Progressing  Goal: Absence of Hospital-Acquired Illness or Injury  Outcome: Ongoing, Progressing  Goal: Optimal Comfort and Wellbeing  Outcome: Ongoing, Progressing     Problem: Bleeding (Surgery Nonspecified)  Goal: Absence of Bleeding  Outcome: Ongoing, Progressing     Problem: Bowel Motility Impaired (Surgery Nonspecified)  Goal: Effective Bowel Elimination  Outcome: Ongoing, Progressing     Problem: Fluid and Electrolyte Imbalance (Surgery Nonspecified)  Goal: Fluid and Electrolyte Balance  Outcome: Ongoing, Progressing     Problem: Infection (Surgery Nonspecified)  Goal: Absence of Infection Signs and Symptoms  Outcome: Ongoing, Not Progressing     Problem: Pain (Surgery Nonspecified)  Goal: Acceptable Pain Control  Outcome: Ongoing, Progressing     Problem: Postoperative Nausea and Vomiting (Surgery Nonspecified)  Goal: Nausea and Vomiting Relief  Outcome: Ongoing, Progressing     Problem: Postoperative Urinary Retention (Surgery Nonspecified)  Goal: Effective Urinary Elimination  Outcome: Ongoing, Progressing     Problem: Respiratory Compromise (Surgery Nonspecified)  Goal: Effective Oxygenation and Ventilation  Outcome: Ongoing, Progressing

## 2023-08-12 NOTE — ASSESSMENT & PLAN NOTE
Mr. Page is a 53 YOM with cirrhosis 2/2 to EtOH use and HCV, esophageal varices, GIB, HTN, hypothyroidism, MDD, hydrocephalous, cholelithiasis, who presented to OSH for abdominal pain and is coming to [facility] for IR evaluation of intra-thoracic hematoma and hepatology evaluation. Presented for abdominal pain which has now resolved. CT A/P with IV contrast done and shows upper abdominal fluid collection likely a hematoma, surrounds the liver, stomach, and spleen. Origin is not identified however may represent a variceal or hepatic hematoma. No active extravasation identified. Patient transferred for IR and hepatology evaluation. History of past varices ligation. Has 1.1 cm hypoattenuating lesion in left hepatic lobe.     CT triple phase: loculated ascites; no mass lesions of the liver identified, though arterial phase not assessed.  Ascites cultures: negative gram stain and aerobic culture.  WBC 8030 > 14205.    Repeat CT: slight improvement in perihepatic fluid; mild increased free abdominopelvic fluid  8/8: d/c'd metronidazole and started zosyn  8/9: drain exchange per IR, repeat cx's    PLAN:  IR consulted, appreciate recs  - monitor drain output  - Flush drain   - follow up cultures  - Will plan to repeat CT in about 2 weeks to reassess collection    ID consulted, appreciate recs   - IV piperacillin/tazobactam 13.5 grams IV q8 hours or 12 grams IV via continuous infusion daily, plan for four weeks of therapy for now, tentative EOT 09/07/2023        - If discharged to [facility], recommend ID consult on admission. If discharged to outside LTAC, will need ID consult if available

## 2023-08-13 LAB
ALBUMIN SERPL BCP-MCNC: 2.2 G/DL (ref 3.5–5.2)
ALP SERPL-CCNC: 86 U/L (ref 55–135)
ALT SERPL W/O P-5'-P-CCNC: 11 U/L (ref 10–44)
ANION GAP SERPL CALC-SCNC: 5 MMOL/L (ref 8–16)
AST SERPL-CCNC: 27 U/L (ref 10–40)
BASOPHILS # BLD AUTO: 0.1 K/UL (ref 0–0.2)
BASOPHILS NFR BLD: 0.9 % (ref 0–1.9)
BILIRUB SERPL-MCNC: 1.2 MG/DL (ref 0.1–1)
BUN SERPL-MCNC: 5 MG/DL (ref 6–20)
CALCIUM SERPL-MCNC: 8.3 MG/DL (ref 8.7–10.5)
CHLORIDE SERPL-SCNC: 109 MMOL/L (ref 95–110)
CO2 SERPL-SCNC: 24 MMOL/L (ref 23–29)
CREAT SERPL-MCNC: 0.6 MG/DL (ref 0.5–1.4)
DIFFERENTIAL METHOD: ABNORMAL
EOSINOPHIL # BLD AUTO: 0.3 K/UL (ref 0–0.5)
EOSINOPHIL NFR BLD: 2.9 % (ref 0–8)
ERYTHROCYTE [DISTWIDTH] IN BLOOD BY AUTOMATED COUNT: 15.6 % (ref 11.5–14.5)
EST. GFR  (NO RACE VARIABLE): >60 ML/MIN/1.73 M^2
GLUCOSE SERPL-MCNC: 81 MG/DL (ref 70–110)
HCT VFR BLD AUTO: 39.1 % (ref 40–54)
HGB BLD-MCNC: 12.2 G/DL (ref 14–18)
IMM GRANULOCYTES # BLD AUTO: 0.04 K/UL (ref 0–0.04)
IMM GRANULOCYTES NFR BLD AUTO: 0.4 % (ref 0–0.5)
INR PPP: 1.2 (ref 0.8–1.2)
LYMPHOCYTES # BLD AUTO: 1.4 K/UL (ref 1–4.8)
LYMPHOCYTES NFR BLD: 12.7 % (ref 18–48)
MAGNESIUM SERPL-MCNC: 1.7 MG/DL (ref 1.6–2.6)
MCH RBC QN AUTO: 29.1 PG (ref 27–31)
MCHC RBC AUTO-ENTMCNC: 31.2 G/DL (ref 32–36)
MCV RBC AUTO: 93 FL (ref 82–98)
MONOCYTES # BLD AUTO: 1.4 K/UL (ref 0.3–1)
MONOCYTES NFR BLD: 12.7 % (ref 4–15)
NEUTROPHILS # BLD AUTO: 7.6 K/UL (ref 1.8–7.7)
NEUTROPHILS NFR BLD: 70.4 % (ref 38–73)
NRBC BLD-RTO: 0 /100 WBC
PHOSPHATE SERPL-MCNC: 3.3 MG/DL (ref 2.7–4.5)
PLATELET # BLD AUTO: 137 K/UL (ref 150–450)
PMV BLD AUTO: 11.5 FL (ref 9.2–12.9)
POTASSIUM SERPL-SCNC: 3.9 MMOL/L (ref 3.5–5.1)
PROT SERPL-MCNC: 5.8 G/DL (ref 6–8.4)
PROTHROMBIN TIME: 12.9 SEC (ref 9–12.5)
RBC # BLD AUTO: 4.19 M/UL (ref 4.6–6.2)
SODIUM SERPL-SCNC: 138 MMOL/L (ref 136–145)
WBC # BLD AUTO: 10.84 K/UL (ref 3.9–12.7)

## 2023-08-13 PROCEDURE — 25000003 PHARM REV CODE 250

## 2023-08-13 PROCEDURE — 85025 COMPLETE CBC W/AUTO DIFF WBC: CPT

## 2023-08-13 PROCEDURE — 36415 COLL VENOUS BLD VENIPUNCTURE: CPT

## 2023-08-13 PROCEDURE — 80053 COMPREHEN METABOLIC PANEL: CPT

## 2023-08-13 PROCEDURE — 99232 SBSQ HOSP IP/OBS MODERATE 35: CPT | Mod: ,,, | Performed by: STUDENT IN AN ORGANIZED HEALTH CARE EDUCATION/TRAINING PROGRAM

## 2023-08-13 PROCEDURE — 63600175 PHARM REV CODE 636 W HCPCS: Performed by: STUDENT IN AN ORGANIZED HEALTH CARE EDUCATION/TRAINING PROGRAM

## 2023-08-13 PROCEDURE — 84100 ASSAY OF PHOSPHORUS: CPT

## 2023-08-13 PROCEDURE — 11000001 HC ACUTE MED/SURG PRIVATE ROOM

## 2023-08-13 PROCEDURE — 99232 PR SUBSEQUENT HOSPITAL CARE,LEVL II: ICD-10-PCS | Mod: ,,, | Performed by: STUDENT IN AN ORGANIZED HEALTH CARE EDUCATION/TRAINING PROGRAM

## 2023-08-13 PROCEDURE — 83735 ASSAY OF MAGNESIUM: CPT

## 2023-08-13 PROCEDURE — A4216 STERILE WATER/SALINE, 10 ML: HCPCS | Performed by: STUDENT IN AN ORGANIZED HEALTH CARE EDUCATION/TRAINING PROGRAM

## 2023-08-13 PROCEDURE — 85610 PROTHROMBIN TIME: CPT | Performed by: STUDENT IN AN ORGANIZED HEALTH CARE EDUCATION/TRAINING PROGRAM

## 2023-08-13 PROCEDURE — 25000003 PHARM REV CODE 250: Performed by: STUDENT IN AN ORGANIZED HEALTH CARE EDUCATION/TRAINING PROGRAM

## 2023-08-13 RX ADMIN — PROPRANOLOL HYDROCHLORIDE 10 MG: 10 TABLET ORAL at 09:08

## 2023-08-13 RX ADMIN — ENOXAPARIN SODIUM 40 MG: 40 INJECTION SUBCUTANEOUS at 04:08

## 2023-08-13 RX ADMIN — PANTOPRAZOLE SODIUM 40 MG: 40 TABLET, DELAYED RELEASE ORAL at 08:08

## 2023-08-13 RX ADMIN — LEVOTHYROXINE SODIUM 75 MCG: 75 TABLET ORAL at 04:08

## 2023-08-13 RX ADMIN — BUSPIRONE HYDROCHLORIDE 7.5 MG: 5 TABLET ORAL at 09:08

## 2023-08-13 RX ADMIN — PIPERACILLIN SODIUM AND TAZOBACTAM SODIUM 4.5 G: 4; .5 INJECTION, POWDER, FOR SOLUTION INTRAVENOUS at 04:08

## 2023-08-13 RX ADMIN — PROPRANOLOL HYDROCHLORIDE 10 MG: 10 TABLET ORAL at 08:08

## 2023-08-13 RX ADMIN — Medication 10 ML: at 08:08

## 2023-08-13 RX ADMIN — BUSPIRONE HYDROCHLORIDE 7.5 MG: 5 TABLET ORAL at 08:08

## 2023-08-13 RX ADMIN — Medication 10 ML: at 09:08

## 2023-08-13 RX ADMIN — PIPERACILLIN SODIUM AND TAZOBACTAM SODIUM 4.5 G: 4; .5 INJECTION, POWDER, FOR SOLUTION INTRAVENOUS at 01:08

## 2023-08-13 RX ADMIN — SERTRALINE HYDROCHLORIDE 25 MG: 25 TABLET ORAL at 08:08

## 2023-08-13 RX ADMIN — PIPERACILLIN SODIUM AND TAZOBACTAM SODIUM 4.5 G: 4; .5 INJECTION, POWDER, FOR SOLUTION INTRAVENOUS at 09:08

## 2023-08-13 NOTE — SUBJECTIVE & OBJECTIVE
Interval History: THERESA    Review of Systems  Constitutional:  Positive for fatigue. Negative for activity change and fever.   HENT:  Negative for congestion and sore throat.    Respiratory:  Negative for shortness of breath.    Cardiovascular:  Negative for chest pain.   Gastrointestinal:  Positive for abdominal distention Negative for blood in stool, diarrhea, nausea and vomiting.   Genitourinary:  Negative for dysuria and hematuria.   Musculoskeletal:  Negative for arthralgias, joint swelling and neck stiffness.   Neurological:  Negative for light-headedness.   Psychiatric/Behavioral:  Negative for confusion.    Objective:     Vital Signs (Most Recent):  Temp: 96.2 °F (35.7 °C) (08/13/23 0833)  Pulse: 78 (08/13/23 0833)  Resp: 18 (08/13/23 0833)  BP: (!) 115/56 (08/13/23 0833)  SpO2: (!) 91 % (08/13/23 0833) Vital Signs (24h Range):  Temp:  [96.2 °F (35.7 °C)-97.6 °F (36.4 °C)] 96.2 °F (35.7 °C)  Pulse:  [74-79] 78  Resp:  [14-18] 18  SpO2:  [91 %-94 %] 91 %  BP: (103-116)/(56-64) 115/56     Weight: 92.3 kg (203 lb 7.8 oz)  Body mass index is 28.38 kg/m².    Estimated Creatinine Clearance: 165.3 mL/min (based on SCr of 0.6 mg/dL).     Physical Exam     Constitutional:       Appearance: Normal appearance.   HENT:      Head: Normocephalic and atraumatic.      Nose: Nose normal.      Mouth/Throat:      Mouth: Mucous membranes are moist.      Pharynx: Oropharynx is clear.   Eyes:      Conjunctiva/sclera: Conjunctivae normal.   Cardiovascular:      Rate and Rhythm: Normal rate and regular rhythm.      Pulses: Normal pulses.      Heart sounds: Normal heart sounds.   Pulmonary:      Effort: Pulmonary effort is normal.      Breath sounds: Normal breath sounds.   Abdominal:      General: Bowel sounds are normal. There is distension.      Palpations: Abdomen is soft. Minimal tenderness around drain site     Tenderness: There is no guarding or rebound.      Comments: Abdominal RUQ drain with yellow tinged serous fluid    Musculoskeletal:         General: No deformity. Normal range of motion.      Cervical back: Normal range of motion and neck supple.   Skin:     General: Skin is warm and dry.      Coloration: Skin is not jaundiced.      Findings: No rash.      Comments: Dry, erythematous scaly rash present on trunk, abdomen and extremities   Neurological:      Mental Status: He is alert and oriented to person, place, and time. Mental status is at baseline.   Psychiatric:         Behavior: Behavior normal.   Significant Labs:   Microbiology Results (last 7 days)       Procedure Component Value Units Date/Time    Aerobic culture [[id number]] Collected: 08/09/23 0811    Order Status: Completed Specimen: Peritoneal Fluid from Abdomen Updated: 08/12/23 1120     Aerobic Bacterial Culture No growth    Culture, Anaerobe [[id number]] Collected: 08/09/23 0811    Order Status: Completed Specimen: Body Fluid from Abdomen Updated: 08/11/23 1243     Anaerobic Culture Culture in progress    Culture, Anaerobe [[id number]] Collected: 08/04/23 1409    Order Status: Completed Specimen: Abscess from Abdomen Updated: 08/11/23 1000     Anaerobic Culture No anaerobes isolated    Fungus culture [[id number]] Collected: 08/04/23 1409    Order Status: Completed Specimen: Abscess from Abdomen Updated: 08/10/23 0752     Fungus (Mycology) Culture Culture in progress    Blood culture [[id number]] Collected: 08/03/23 1422    Order Status: Completed Specimen: Blood Updated: 08/08/23 1612     Blood Culture, Routine No growth after 5 days.    Blood culture [[id number]] Collected: 08/03/23 1424    Order Status: Completed Specimen: Blood Updated: 08/08/23 1612     Blood Culture, Routine No growth after 5 days.    Aerobic culture [[id number]] Collected: 08/04/23 1409    Order Status: Completed Specimen: Abscess from Abdomen Updated: 08/07/23 0732     Aerobic Bacterial Culture No growth          All pertinent labs within the past 24 hours have been reviewed.  Recent Lab  Results         08/13/23  0617        Albumin 2.2       Alkaline Phosphatase 86       ALT 11       Anion Gap 5       AST 27       Baso # 0.10       Basophil % 0.9       BILIRUBIN TOTAL 1.2  Comment: For infants and newborns, interpretation of results should be based  on gestational age, weight and in agreement with clinical  observations.    Premature Infant recommended reference ranges:  Up to 24 hours.............<8.0 mg/dL  Up to 48 hours............<12.0 mg/dL  3-5 days..................<15.0 mg/dL  6-29 days.................<15.0 mg/dL         BUN 5       Calcium 8.3       Chloride 109       CO2 24       Creatinine 0.6       Differential Method Automated       eGFR >60.0       Eos # 0.3       Eosinophil % 2.9       Glucose 81       Gran # (ANC) 7.6       Gran % 70.4       Hematocrit 39.1       Hemoglobin 12.2       Immature Grans (Abs) 0.04  Comment: Mild elevation in immature granulocytes is non specific and   can be seen in a variety of conditions including stress response,   acute inflammation, trauma and pregnancy. Correlation with other   laboratory and clinical findings is essential.         Immature Granulocytes 0.4       INR 1.2  Comment: Coumadin Therapy:  2.0 - 3.0 for INR for all indicators except mechanical heart valves  and antiphospholipid syndromes which should use 2.5 - 3.5.         Lymph # 1.4       Lymph % 12.7       Magnesium 1.7       MCH 29.1       MCHC 31.2       MCV 93       Mono # 1.4       Mono % 12.7       MPV 11.5       nRBC 0       Phosphorus 3.3       Platelets 137       Potassium 3.9       PROTEIN TOTAL 5.8       Protime 12.9       RBC 4.19       RDW 15.6       Sodium 138       WBC 10.84               Significant Imaging: I have reviewed all pertinent imaging results/findings within the past 24 hours.

## 2023-08-13 NOTE — PLAN OF CARE
Patient resting in bed, awake and alert. No distress or discomfort noted. Patient awaken to verbal stimuli. Vital signs obtained and stable. PM assessment completed. IV Zosyn ongoing. Bed in lowest position. Call light in reach. POC ongoing.   Problem: Adult Inpatient Plan of Care  Goal: Plan of Care Review  Outcome: Ongoing, Progressing  Goal: Patient-Specific Goal (Individualized)  Outcome: Ongoing, Progressing  Goal: Absence of Hospital-Acquired Illness or Injury  Outcome: Ongoing, Progressing  Goal: Optimal Comfort and Wellbeing  Outcome: Ongoing, Progressing  Goal: Readiness for Transition of Care  Outcome: Ongoing, Progressing     Problem: Infection (Surgery Nonspecified)  Goal: Absence of Infection Signs and Symptoms  Outcome: Ongoing, Progressing     Problem: Pain (Surgery Nonspecified)  Goal: Acceptable Pain Control  Outcome: Ongoing, Progressing

## 2023-08-13 NOTE — SUBJECTIVE & OBJECTIVE
Interval History: NAEO.    Review of Systems   Constitutional:  Negative for chills and fever.   Respiratory:  Negative for shortness of breath and stridor.    Cardiovascular:  Negative for chest pain.   Gastrointestinal:  Positive for abdominal pain. Negative for constipation, nausea and vomiting.   Musculoskeletal:  Negative for gait problem and neck stiffness.     Objective:     Vital Signs (Most Recent):  Temp: 96.2 °F (35.7 °C) (08/13/23 0833)  Pulse: 78 (08/13/23 0833)  Resp: 18 (08/13/23 0833)  BP: (!) 115/56 (08/13/23 0833)  SpO2: (!) 91 % (08/13/23 0833) Vital Signs (24h Range):  Temp:  [96.2 °F (35.7 °C)-97.4 °F (36.3 °C)] 96.2 °F (35.7 °C)  Pulse:  [74-79] 78  Resp:  [18] 18  SpO2:  [91 %-94 %] 91 %  BP: (103-116)/(56-64) 115/56     Weight: 92.3 kg (203 lb 7.8 oz)  Body mass index is 28.38 kg/m².    Intake/Output Summary (Last 24 hours) at 8/13/2023 1252  Last data filed at 8/13/2023 0449  Gross per 24 hour   Intake 560 ml   Output 12.5 ml   Net 547.5 ml         Physical Exam  Vitals and nursing note reviewed.   Constitutional:       General: He is not in acute distress.     Appearance: Normal appearance. He is not toxic-appearing.   HENT:      Head: Normocephalic and atraumatic.   Eyes:      Extraocular Movements: Extraocular movements intact.      Pupils: Pupils are equal, round, and reactive to light.   Pulmonary:      Effort: No respiratory distress.   Musculoskeletal:         General: No deformity. Normal range of motion.      Cervical back: Normal range of motion and neck supple.      Right lower leg: No edema.      Left lower leg: No edema.   Skin:     General: Skin is warm and dry.   Neurological:      Mental Status: He is alert.   Psychiatric:         Mood and Affect: Mood normal.         Behavior: Behavior normal.             Significant Labs: All pertinent labs within the past 24 hours have been reviewed.    Significant Imaging: I have reviewed all pertinent imaging results/findings within the  past 24 hours.

## 2023-08-13 NOTE — ASSESSMENT & PLAN NOTE
Mr. Page is a 53 YOM with cirrhosis 2/2 to EtOH use and HCV, esophageal varices, GIB, HTN, hypothyroidism, MDD, hydrocephalous, cholelithiasis, who presented to OSH for abdominal pain and is coming to [facility] for IR evaluation of intra-thoracic hematoma and hepatology evaluation. Presented for abdominal pain which has now resolved. CT A/P with IV contrast done and shows upper abdominal fluid collection likely a hematoma, surrounds the liver, stomach, and spleen. Origin is not identified however may represent a variceal or hepatic hematoma. No active extravasation identified. Patient transferred for IR and hepatology evaluation. History of past varices ligation. Has 1.1 cm hypoattenuating lesion in left hepatic lobe.     CT triple phase: loculated ascites; no mass lesions of the liver identified, though arterial phase not assessed.  Ascites cultures: negative gram stain and aerobic culture.  WBC 8030 > 22960.    Repeat CT: slight improvement in perihepatic fluid; mild increased free abdominopelvic fluid  8/8: d/c'd metronidazole and started zosyn  8/9: drain exchange per IR, repeat cx's    PLAN:  IR consulted, appreciate recs  - monitor drain output  - Flush drain   - follow up cultures  - Will plan to repeat CT in about 2 weeks to reassess collection    ID consulted, appreciate recs   - IV piperacillin/tazobactam 13.5 grams IV q8 hours or 12 grams IV via continuous infusion daily, plan for four weeks of therapy for now, tentative EOT 09/07/2023        - If discharged to [facility], recommend ID consult on admission. If discharged to outside LTAC, will need ID consult if available

## 2023-08-13 NOTE — PROGRESS NOTES
[facility] Medicine  Progress Note    Patient Name: Gregg Page  MRN: [id number]  Patient Class: IP- Inpatient   Admission Date: 8/3/2023  Length of Stay: 10 days  Attending Physician: Oliverio Camacho, *  Primary Care Provider: No, Primary Doctor        Subjective:     Principal Problem:Intra-abdominal abscess        HPI:  Mr. Page is a 53 YOM with cirrhosis 2/2 to EtOH use and HCV, esophageal varices, GIB, HTN, hypothyroidism, MDD, hydrocephalous, cholelithiasis, who presented to OSH for abdominal pain and is coming to [facility] for IR evaluation of intra-thoracic hematoma and hepatology evaluation. Of note, patient was hospitalized 6/23 with similar presentation had MRCP done at that time (unavailable for review).  Abdominal pain radiates to the chest, onset 6 days prior to presentation at OSH. Patient denies melena, BRBPR, continued abdominal pain or CP. Patient states morning of transfer he started having tongue swelling and pain, feels dry, remitted with water, has dysphagia when his tongue is dry only, pain described as burning and located throughout tongue. No recent procedures/liver biopsy done. Patient also states for the past 1 week has had intractable hiccups which cause anxiety.     Patient hemodynamically stable. Labs concerning for new leukocytosis 17.9, normocytic anemia 12.6 ammonia 50, low Hct 32.2, elevated PT 16.6, INR 1.31, Troponin, lipase, U/A, BNP, CXR wnl    CT A/P with IV contrast done and shows upper abdominal fluid collection likely a hematoma, surrounds the liver, stomach, and spleen. Origin is not identified however may represent a variceal or hepatic hematoma. No active extravasation identified. Collection measures greater than 21.7 cm in transverse diameter and largest single pocket measures approx 7.5cm in maximal AP dimension.      Overview/Hospital Course:  Procal 0.59; blood cultures show NGTD. CT triple phase shows loculated ascites and no visualized liver  lesion. IR drainage of presumed intra-abdominal abscess 8/4/2023; pending cxs. Cytology reveals elevated WBC at 8030; aerobic cx negative, gram stain negative. ID consulted and recommended repeating cell count with continued abx. Repeat abdominal fluid cytology shows WBC increased to 11,000. CT obtained, pending results and IR recs. Repeat CT on 8/8 shows slight improvement in perihepatic fluid and mild increased free abdominopelvic fluid; d/c'd metronidazole and started zosyn. IR drain exchange took place 8/9, repeated cx's obtained. MRI 8/9 shows peripherally enhancing loculated fluid collection surrounding the left hepatic lobe with drainage catheter in place with no obvious connection to the biliary system. Differential includes abscess versus loculated ascites. WBC improved with pip/tazo. Awaiting LTAC placement.      Interval History: NAEO.    Review of Systems   Constitutional:  Negative for chills and fever.   Respiratory:  Negative for shortness of breath and stridor.    Cardiovascular:  Negative for chest pain.   Gastrointestinal:  Positive for abdominal pain. Negative for constipation, nausea and vomiting.   Musculoskeletal:  Negative for gait problem and neck stiffness.     Objective:     Vital Signs (Most Recent):  Temp: 96.2 °F (35.7 °C) (08/13/23 0833)  Pulse: 78 (08/13/23 0833)  Resp: 18 (08/13/23 0833)  BP: (!) 115/56 (08/13/23 0833)  SpO2: (!) 91 % (08/13/23 0833) Vital Signs (24h Range):  Temp:  [96.2 °F (35.7 °C)-97.4 °F (36.3 °C)] 96.2 °F (35.7 °C)  Pulse:  [74-79] 78  Resp:  [18] 18  SpO2:  [91 %-94 %] 91 %  BP: (103-116)/(56-64) 115/56     Weight: 92.3 kg (203 lb 7.8 oz)  Body mass index is 28.38 kg/m².    Intake/Output Summary (Last 24 hours) at 8/13/2023 1252  Last data filed at 8/13/2023 0449  Gross per 24 hour   Intake 560 ml   Output 12.5 ml   Net 547.5 ml         Physical Exam  Vitals and nursing note reviewed.   Constitutional:       General: He is not in acute distress.     Appearance:  Normal appearance. He is not toxic-appearing.   HENT:      Head: Normocephalic and atraumatic.   Eyes:      Extraocular Movements: Extraocular movements intact.      Pupils: Pupils are equal, round, and reactive to light.   Pulmonary:      Effort: No respiratory distress.   Musculoskeletal:         General: No deformity. Normal range of motion.      Cervical back: Normal range of motion and neck supple.      Right lower leg: No edema.      Left lower leg: No edema.   Skin:     General: Skin is warm and dry.   Neurological:      Mental Status: He is alert.   Psychiatric:         Mood and Affect: Mood normal.         Behavior: Behavior normal.             Significant Labs: All pertinent labs within the past 24 hours have been reviewed.    Significant Imaging: I have reviewed all pertinent imaging results/findings within the past 24 hours.      Assessment/Plan:      * Intra-abdominal abscess  Mr. Page is a 53 YOM with cirrhosis 2/2 to EtOH use and HCV, esophageal varices, GIB, HTN, hypothyroidism, MDD, hydrocephalous, cholelithiasis, who presented to OSH for abdominal pain and is coming to [facility] for IR evaluation of intra-thoracic hematoma and hepatology evaluation. Presented for abdominal pain which has now resolved. CT A/P with IV contrast done and shows upper abdominal fluid collection likely a hematoma, surrounds the liver, stomach, and spleen. Origin is not identified however may represent a variceal or hepatic hematoma. No active extravasation identified. Patient transferred for IR and hepatology evaluation. History of past varices ligation. Has 1.1 cm hypoattenuating lesion in left hepatic lobe.     CT triple phase: loculated ascites; no mass lesions of the liver identified, though arterial phase not assessed.  Ascites cultures: negative gram stain and aerobic culture.  WBC 8030 > 00578.    Repeat CT: slight improvement in perihepatic fluid; mild increased free abdominopelvic fluid  8/8: d/c'd metronidazole and  started zosyn  8/9: drain exchange per IR, repeat cx's    PLAN:  IR consulted, appreciate recs  - monitor drain output  - Flush drain   - follow up cultures  - Will plan to repeat CT in about 2 weeks to reassess collection    ID consulted, appreciate recs   - IV piperacillin/tazobactam 13.5 grams IV q8 hours or 12 grams IV via continuous infusion daily, plan for four weeks of therapy for now, tentative EOT 09/07/2023        - If discharged to [facility], recommend ID consult on admission. If discharged to outside LTAC, will need ID consult if available    Cirrhosis    MELD 3.0: 11 at 8/12/2023  3:35 AM  MELD-Na: 9 at 8/12/2023  3:35 AM  Calculated from:  Serum Creatinine: 0.6 mg/dL (Using min of 1 mg/dL) at 8/12/2023  3:35 AM  Serum Sodium: 140 mmol/L (Using max of 137 mmol/L) at 8/12/2023  3:35 AM  Total Bilirubin: 0.9 mg/dL (Using min of 1 mg/dL) at 8/12/2023  3:35 AM  Serum Albumin: 2.3 g/dL at 8/12/2023  3:35 AM  INR(ratio): 1.3 at 8/12/2023  3:35 AM  Age at listing (hypothetical): 53 years  Sex: Male at 8/12/2023  3:35 AM    Ammonia 67, concern for hepatic encephalopathy 2/2 report of one episode of hallucinations per wife. No subsequent episodes.    PLAN:  - Referred to outpatient hepatologist  - patient on nadolol at OSH, started on propranolol this admission  - continue pantoprazole 40 mg from OSH   - delirium precautions  - lactulose    Normocytic anemia  Patient presents with anemia 12.1 mcv 87 (normocytic anemia 12.6 at OSH). No known baseline hemoglobin. Patient with new hematoma formation.     Iron panel: iron 26, TIBC 250, transferrin 169, Ferritin 213.     8/8: thrombocytes 94, lovenox held  8/9: plts 108    Hiccups  Patient with new hiccups for one week while at OSH. possibly 2/2 from loculated ascites causing irritation to phrenic nerve     Improved with Thorazine. Significantly better now.    PLAN:  - Thorazine PRN  -baclofen prn breakthrough    Hypocalcemia    Recent Labs   Lab 08/11/23  0302  "  CALCIUM 8.0*       No results for input(s): "CAION" in the last 24 hours.      LUISA (iron deficiency anemia)  Transfuse hgb greater than 7      Hypokalemia  Repletion daily      Leukocytosis  RESOLVED    Patient with leukocytosis that was present at OSH at 17.9 and downtrending to 15.24. patient otherwise is hemodynamically stable. Low concern for sepsis at this time however high chance for decompensation given presence of hematoma    Unclear etiology consider infected hematoma vs stress    - continue to monitor     HTN (hypertension)  Patient normotensive with hematoma present. Given risk for possible hemorrhagic shock will hold home BP meds     - hold home amlodipine 5   - hold home HCTZ 25     Gallstones  Cholelithiasis without cystitis   Seen on imaging 8/2/23    - monitor ALP and bili    HCV (hepatitis C virus)  Per OSH records patient with history HCV          MDD (major depressive disorder)  - continue home sertraline 50 mg   - continue buspirone 7.5 mg BID    Hypothyroidism  - continue home synthroid 75 mcg      VTE Risk Mitigation (From admission, onward)         Ordered     enoxaparin injection 40 mg  Daily         08/07/23 1005     IP VTE LOW RISK PATIENT  Once         08/07/23 1005     Place sequential compression device  Until discontinued         08/03/23 0108                Discharge Planning   DONIS: 8/14/2023     Code Status: Full Code   Is the patient medically ready for discharge?: Yes    Reason for patient still in hospital (select all that apply): Pending disposition  Discharge Plan A: Home with family                  Ailyn Duong DO  Department of Hospital Medicine   [facility] - Surgery    "

## 2023-08-13 NOTE — PLAN OF CARE
Problem: Adult Inpatient Plan of Care  Goal: Plan of Care Review  8/13/2023 1757 by Jeffery Noriega RN  Outcome: Ongoing, Progressing  8/13/2023 1715 by Jeffery Noriega RN  Outcome: Ongoing, Progressing  Goal: Patient-Specific Goal (Individualized)  8/13/2023 1757 by Jeffery Noriega RN  Outcome: Ongoing, Progressing  8/13/2023 1715 by Jeffery Noriega RN  Outcome: Ongoing, Progressing  Goal: Absence of Hospital-Acquired Illness or Injury  8/13/2023 1757 by Jeffery Noriega RN  Outcome: Ongoing, Progressing  8/13/2023 1715 by Jeffery Noriega RN  Outcome: Ongoing, Progressing  Goal: Optimal Comfort and Wellbeing  8/13/2023 1757 by Jeffery Noriega RN  Outcome: Ongoing, Progressing  8/13/2023 1715 by Jeffery Noriega RN  Outcome: Ongoing, Progressing  Goal: Readiness for Transition of Care  8/13/2023 1757 by Jeffery Noriega RN  Outcome: Ongoing, Progressing  8/13/2023 1715 by Jeffery Noriega RN  Outcome: Ongoing, Progressing     Problem: Bleeding (Surgery Nonspecified)  Goal: Absence of Bleeding  8/13/2023 1757 by Jeffery Noriega RN  Outcome: Ongoing, Progressing  8/13/2023 1715 by Jeffery Noriega RN  Outcome: Ongoing, Progressing     Problem: Bowel Motility Impaired (Surgery Nonspecified)  Goal: Effective Bowel Elimination  8/13/2023 1757 by Jeffery Noriega RN  Outcome: Ongoing, Progressing  8/13/2023 1715 by Jeffery Noriega RN  Outcome: Ongoing, Progressing     Problem: Fluid and Electrolyte Imbalance (Surgery Nonspecified)  Goal: Fluid and Electrolyte Balance  8/13/2023 1757 by Jeffery Noriega RN  Outcome: Ongoing, Progressing  8/13/2023 1715 by Jeffery Noriega RN  Outcome: Ongoing, Progressing     Problem: Glycemic Control Impaired (Surgery Nonspecified)  Goal: Blood Glucose Level Within Targeted Range  8/13/2023 1757 by Jeffery Noriega, RN  Outcome: Ongoing, Progressing  8/13/2023 1715 by Jeffery Noriega, RN  Outcome: Ongoing, Progressing     Problem: Infection  (Surgery Nonspecified)  Goal: Absence of Infection Signs and Symptoms  8/13/2023 1757 by Jeffery Noriega RN  Outcome: Ongoing, Progressing  8/13/2023 1715 by Jeffery Noriega RN  Outcome: Ongoing, Progressing     Problem: Ongoing Anesthesia Effects (Surgery Nonspecified)  Goal: Anesthesia/Sedation Recovery  8/13/2023 1757 by Jeffery Noriega RN  Outcome: Ongoing, Progressing  8/13/2023 1715 by Jeffery Noriega RN  Outcome: Ongoing, Progressing     Problem: Pain (Surgery Nonspecified)  Goal: Acceptable Pain Control  8/13/2023 1757 by Jeffery Noriega RN  Outcome: Ongoing, Progressing  8/13/2023 1715 by Jeffery Noriega RN  Outcome: Ongoing, Progressing     Problem: Postoperative Nausea and Vomiting (Surgery Nonspecified)  Goal: Nausea and Vomiting Relief  8/13/2023 1757 by Jeffery Noriega RN  Outcome: Ongoing, Progressing  8/13/2023 1715 by Jeffery Noriega RN  Outcome: Ongoing, Progressing     Problem: Postoperative Urinary Retention (Surgery Nonspecified)  Goal: Effective Urinary Elimination  8/13/2023 1757 by Jeffery Noriega RN  Outcome: Ongoing, Progressing  8/13/2023 1715 by Jeffery Noriega RN  Outcome: Ongoing, Progressing     Problem: Respiratory Compromise (Surgery Nonspecified)  Goal: Effective Oxygenation and Ventilation  8/13/2023 1757 by Jeffery Noriega RN  Outcome: Ongoing, Progressing  8/13/2023 1715 by Jeffery Noriega RN  Outcome: Ongoing, Progressing     Patient was sleeping on and off throughout the day. No complaints of pain or discomfort. Drain had minimal output today. Patient did ambulate in stark in the afternoon. Plan of care continues.

## 2023-08-14 LAB
ALBUMIN SERPL BCP-MCNC: 2.2 G/DL (ref 3.5–5.2)
ALP SERPL-CCNC: 87 U/L (ref 55–135)
ALT SERPL W/O P-5'-P-CCNC: 11 U/L (ref 10–44)
ANION GAP SERPL CALC-SCNC: 6 MMOL/L (ref 8–16)
AST SERPL-CCNC: 25 U/L (ref 10–40)
BASOPHILS # BLD AUTO: 0.1 K/UL (ref 0–0.2)
BASOPHILS NFR BLD: 0.9 % (ref 0–1.9)
BILIRUB SERPL-MCNC: 1.3 MG/DL (ref 0.1–1)
BUN SERPL-MCNC: 6 MG/DL (ref 6–20)
CALCIUM SERPL-MCNC: 8.4 MG/DL (ref 8.7–10.5)
CHLORIDE SERPL-SCNC: 108 MMOL/L (ref 95–110)
CO2 SERPL-SCNC: 24 MMOL/L (ref 23–29)
CREAT SERPL-MCNC: 0.6 MG/DL (ref 0.5–1.4)
DIFFERENTIAL METHOD: ABNORMAL
EOSINOPHIL # BLD AUTO: 0.3 K/UL (ref 0–0.5)
EOSINOPHIL NFR BLD: 2.8 % (ref 0–8)
ERYTHROCYTE [DISTWIDTH] IN BLOOD BY AUTOMATED COUNT: 15.3 % (ref 11.5–14.5)
EST. GFR  (NO RACE VARIABLE): >60 ML/MIN/1.73 M^2
GLUCOSE SERPL-MCNC: 80 MG/DL (ref 70–110)
HCT VFR BLD AUTO: 38.3 % (ref 40–54)
HGB BLD-MCNC: 12.6 G/DL (ref 14–18)
IMM GRANULOCYTES # BLD AUTO: 0.05 K/UL (ref 0–0.04)
IMM GRANULOCYTES NFR BLD AUTO: 0.5 % (ref 0–0.5)
INR PPP: 1.2 (ref 0.8–1.2)
LYMPHOCYTES # BLD AUTO: 1.5 K/UL (ref 1–4.8)
LYMPHOCYTES NFR BLD: 13.9 % (ref 18–48)
MAGNESIUM SERPL-MCNC: 1.8 MG/DL (ref 1.6–2.6)
MCH RBC QN AUTO: 29.2 PG (ref 27–31)
MCHC RBC AUTO-ENTMCNC: 32.9 G/DL (ref 32–36)
MCV RBC AUTO: 89 FL (ref 82–98)
MONOCYTES # BLD AUTO: 1.2 K/UL (ref 0.3–1)
MONOCYTES NFR BLD: 10.9 % (ref 4–15)
NEUTROPHILS # BLD AUTO: 7.7 K/UL (ref 1.8–7.7)
NEUTROPHILS NFR BLD: 71 % (ref 38–73)
NRBC BLD-RTO: 0 /100 WBC
PHOSPHATE SERPL-MCNC: 2.9 MG/DL (ref 2.7–4.5)
PLATELET # BLD AUTO: 157 K/UL (ref 150–450)
PMV BLD AUTO: 11.1 FL (ref 9.2–12.9)
POTASSIUM SERPL-SCNC: 3.8 MMOL/L (ref 3.5–5.1)
PROT SERPL-MCNC: 6.1 G/DL (ref 6–8.4)
PROTHROMBIN TIME: 13 SEC (ref 9–12.5)
RBC # BLD AUTO: 4.32 M/UL (ref 4.6–6.2)
SODIUM SERPL-SCNC: 138 MMOL/L (ref 136–145)
WBC # BLD AUTO: 10.87 K/UL (ref 3.9–12.7)

## 2023-08-14 PROCEDURE — 83735 ASSAY OF MAGNESIUM: CPT

## 2023-08-14 PROCEDURE — 85025 COMPLETE CBC W/AUTO DIFF WBC: CPT

## 2023-08-14 PROCEDURE — 80053 COMPREHEN METABOLIC PANEL: CPT

## 2023-08-14 PROCEDURE — C1751 CATH, INF, PER/CENT/MIDLINE: HCPCS

## 2023-08-14 PROCEDURE — 99232 PR SUBSEQUENT HOSPITAL CARE,LEVL II: ICD-10-PCS | Mod: ,,, | Performed by: STUDENT IN AN ORGANIZED HEALTH CARE EDUCATION/TRAINING PROGRAM

## 2023-08-14 PROCEDURE — 36415 COLL VENOUS BLD VENIPUNCTURE: CPT | Performed by: STUDENT IN AN ORGANIZED HEALTH CARE EDUCATION/TRAINING PROGRAM

## 2023-08-14 PROCEDURE — 84100 ASSAY OF PHOSPHORUS: CPT

## 2023-08-14 PROCEDURE — 76937 US GUIDE VASCULAR ACCESS: CPT

## 2023-08-14 PROCEDURE — 11000001 HC ACUTE MED/SURG PRIVATE ROOM

## 2023-08-14 PROCEDURE — 25000003 PHARM REV CODE 250

## 2023-08-14 PROCEDURE — 85610 PROTHROMBIN TIME: CPT | Performed by: STUDENT IN AN ORGANIZED HEALTH CARE EDUCATION/TRAINING PROGRAM

## 2023-08-14 PROCEDURE — 36410 VNPNXR 3YR/> PHY/QHP DX/THER: CPT

## 2023-08-14 PROCEDURE — 63600175 PHARM REV CODE 636 W HCPCS: Performed by: STUDENT IN AN ORGANIZED HEALTH CARE EDUCATION/TRAINING PROGRAM

## 2023-08-14 PROCEDURE — 25000003 PHARM REV CODE 250: Performed by: STUDENT IN AN ORGANIZED HEALTH CARE EDUCATION/TRAINING PROGRAM

## 2023-08-14 PROCEDURE — 99232 SBSQ HOSP IP/OBS MODERATE 35: CPT | Mod: ,,, | Performed by: STUDENT IN AN ORGANIZED HEALTH CARE EDUCATION/TRAINING PROGRAM

## 2023-08-14 RX ORDER — LACTULOSE 10 G/15ML
5 SOLUTION ORAL DAILY
Start: 2023-08-15

## 2023-08-14 RX ADMIN — ENOXAPARIN SODIUM 40 MG: 40 INJECTION SUBCUTANEOUS at 05:08

## 2023-08-14 RX ADMIN — LEVOTHYROXINE SODIUM 75 MCG: 75 TABLET ORAL at 05:08

## 2023-08-14 RX ADMIN — PIPERACILLIN SODIUM AND TAZOBACTAM SODIUM 4.5 G: 4; .5 INJECTION, POWDER, FOR SOLUTION INTRAVENOUS at 09:08

## 2023-08-14 RX ADMIN — PIPERACILLIN SODIUM AND TAZOBACTAM SODIUM 4.5 G: 4; .5 INJECTION, POWDER, FOR SOLUTION INTRAVENOUS at 05:08

## 2023-08-14 RX ADMIN — PROPRANOLOL HYDROCHLORIDE 10 MG: 10 TABLET ORAL at 09:08

## 2023-08-14 RX ADMIN — SERTRALINE HYDROCHLORIDE 25 MG: 25 TABLET ORAL at 09:08

## 2023-08-14 RX ADMIN — BUSPIRONE HYDROCHLORIDE 7.5 MG: 5 TABLET ORAL at 09:08

## 2023-08-14 RX ADMIN — PIPERACILLIN SODIUM AND TAZOBACTAM SODIUM 4.5 G: 4; .5 INJECTION, POWDER, FOR SOLUTION INTRAVENOUS at 01:08

## 2023-08-14 RX ADMIN — PANTOPRAZOLE SODIUM 40 MG: 40 TABLET, DELAYED RELEASE ORAL at 09:08

## 2023-08-14 NOTE — ASSESSMENT & PLAN NOTE
Patient presents with anemia 12.1 mcv 87 (normocytic anemia 12.6 at OSH). No known baseline hemoglobin. Patient with new hematoma formation.     Iron panel: iron 26, TIBC 250, transferrin 169, Ferritin 213.     8/8: thrombocytes 94, lovenox held  8/9: plts 108  8/14: Hgb 12.6, ptls: 157

## 2023-08-14 NOTE — PLAN OF CARE
Patient resting in bed with eyes closed. No distress or discomfort noted. Patient awaken to verbal stimuli. Vital signs obtained and  stable. PM assessment completed. Patient denies any needs . Bed in lowest position Call light in reach. POC ongoing.   Problem: Adult Inpatient Plan of Care  Goal: Plan of Care Review  Outcome: Ongoing, Progressing  Goal: Patient-Specific Goal (Individualized)  Outcome: Ongoing, Progressing  Goal: Absence of Hospital-Acquired Illness or Injury  Outcome: Ongoing, Progressing  Goal: Optimal Comfort and Wellbeing  Outcome: Ongoing, Progressing  Goal: Readiness for Transition of Care  Outcome: Ongoing, Progressing     Problem: Bleeding (Surgery Nonspecified)  Goal: Absence of Bleeding  Outcome: Ongoing, Progressing     Problem: Bowel Motility Impaired (Surgery Nonspecified)  Goal: Effective Bowel Elimination  Outcome: Ongoing, Progressing     Problem: Fluid and Electrolyte Imbalance (Surgery Nonspecified)  Goal: Fluid and Electrolyte Balance  Outcome: Ongoing, Progressing     Problem: Glycemic Control Impaired (Surgery Nonspecified)  Goal: Blood Glucose Level Within Targeted Range  Outcome: Ongoing, Progressing     Problem: Infection (Surgery Nonspecified)  Goal: Absence of Infection Signs and Symptoms  Outcome: Ongoing, Progressing     Problem: Ongoing Anesthesia Effects (Surgery Nonspecified)  Goal: Anesthesia/Sedation Recovery  Outcome: Ongoing, Progressing     Problem: Pain (Surgery Nonspecified)  Goal: Acceptable Pain Control  Outcome: Ongoing, Progressing     Problem: Postoperative Nausea and Vomiting (Surgery Nonspecified)  Goal: Nausea and Vomiting Relief  Outcome: Ongoing, Progressing     Problem: Postoperative Urinary Retention (Surgery Nonspecified)  Goal: Effective Urinary Elimination  Outcome: Ongoing, Progressing     Problem: Respiratory Compromise (Surgery Nonspecified)  Goal: Effective Oxygenation and Ventilation  Outcome: Ongoing, Progressing

## 2023-08-14 NOTE — SUBJECTIVE & OBJECTIVE
Interval History: NAOE, VS, pt denies having any hallucinations.     Review of Systems   Constitutional: Negative.    HENT: Negative.     Eyes: Negative.    Respiratory: Negative.     Cardiovascular: Negative.    Gastrointestinal:  Positive for abdominal distention.   Genitourinary: Negative.    Musculoskeletal: Negative.    Skin:  Positive for wound.   Neurological: Negative.    Hematological: Negative.    Psychiatric/Behavioral: Negative.       Objective:     Vital Signs (Most Recent):  Temp: 97.9 °F (36.6 °C) (08/14/23 1105)  Pulse: (!) 55 (08/14/23 1105)  Resp: 16 (08/14/23 1105)  BP: 135/79 (08/14/23 1105)  SpO2: (!) 94 % (08/14/23 1105) Vital Signs (24h Range):  Temp:  [96 °F (35.6 °C)-98 °F (36.7 °C)] 97.9 °F (36.6 °C)  Pulse:  [55-77] 55  Resp:  [16-20] 16  SpO2:  [92 %-96 %] 94 %  BP: (118-135)/(65-79) 135/79     Weight: 92.3 kg (203 lb 7.8 oz)  Body mass index is 28.38 kg/m².    Intake/Output Summary (Last 24 hours) at 8/14/2023 1350  Last data filed at 8/14/2023 0504  Gross per 24 hour   Intake --   Output 0 ml   Net 0 ml         Physical Exam  Constitutional:       Appearance: Normal appearance.   HENT:      Head: Normocephalic and atraumatic.      Nose: Nose normal.      Mouth/Throat:      Mouth: Mucous membranes are moist.   Eyes:      Pupils: Pupils are equal, round, and reactive to light.   Cardiovascular:      Rate and Rhythm: Normal rate and regular rhythm.      Pulses: Normal pulses.      Heart sounds: Normal heart sounds.   Pulmonary:      Effort: Pulmonary effort is normal.      Breath sounds: Normal breath sounds.   Abdominal:      General: Bowel sounds are normal. There is distension.      Palpations: Abdomen is soft.   Skin:     General: Skin is warm.      Comments: Right side drain   Neurological:      Mental Status: He is alert. Mental status is at baseline.   Psychiatric:         Mood and Affect: Mood normal.         Behavior: Behavior normal.         Thought Content: Thought content  normal.         Judgment: Judgment normal.             Significant Labs: All pertinent labs within the past 24 hours have been reviewed.    Significant Imaging: I have reviewed all pertinent imaging results/findings within the past 24 hours.

## 2023-08-14 NOTE — CONSULTS
Single lumen 18G x 10cm midline placed right basilic vein. Max dwell date [id number] and next dressing change due by at least [id number] if not sooner if site requires.Device Lot# [id number].  Needle advanced into the vessel under real time ultrasound guidance.  Image recorded and saved.

## 2023-08-14 NOTE — PLAN OF CARE
[facility]    FACILITY TRANSFER ORDERS      Patient Name: Gregg Page  YOB: 1970    PCP: Katya, Primary Doctor   PCP Address: None  PCP Phone Number: None  PCP Fax: None    Encounter Date: 08/17/2023    Admit to: LTAC    Vital Signs:  Routine    Diagnoses:   Active Hospital Problems    Diagnosis  POA    *Intra-abdominal abscess [K65.1]  Yes    Normocytic anemia [D64.9]  Yes    Hypothyroidism [E03.9]  Yes    Cirrhosis [K74.60]  Yes    MDD (major depressive disorder) [F32.9]  Yes    HCV (hepatitis C virus) [B19.20]  Yes    Gallstones [K80.20]  Yes    HTN (hypertension) [I10]  Yes    Leukocytosis [D72.829]  Yes    LUISA (iron deficiency anemia) [D50.9]  Yes      Resolved Hospital Problems    Diagnosis Date Resolved POA    Altered mental state [R41.82] 08/09/2023 Yes    Hiccups [R06.6] 08/16/2023 Yes    Tongue lesion [K14.8] 08/12/2023 Yes    Hypokalemia [E87.6] 08/16/2023 Yes    Hypocalcemia [E83.51] 08/16/2023 Yes       Allergies:Review of patient's allergies indicates:  No Known Allergies    Diet: regular diet    Activities: Ambulate in stark    Goals of Care Treatment Preferences:  Code Status: Full Code      Nursing: PER FACILITY PROTOCOL      Labs: CBC and CMP  ONCE A WEEK    Outpatient Antibiotic Therapy Plan:    Please send referral to [facility].    1) Infection: intra-abdominal abscess    2) Discharge Antibiotics:    Intravenous antibiotics:   Piperacillin/tazobactam 4.5g IV q 8 hours     3) Therapy Duration:      Estimated end date of IV antibiotics: 09/07/2023    4) Outpatient Weekly Labs:    Order the following labs to be drawn on Mondays:   CBC  CMP     5) Fax Lab Results to Infectious Diseases Provider:   Attn: Dr Gretta Clarke  [facility] Fax Number: [phone]    6) Outpatient Infectious Diseases Follow-up    Follow-up appointment will be arranged by the ID clinic and will be found in the patient's appointments tab.  Prior to discharge,  please ensure the patient's follow-up has been scheduled.    If there is still no follow-up scheduled prior to discharge, please send an EPIC message to Marga Butcher in Infectious Diseases.        CONSULTS:    Physical Therapy to evaluate and treat.  and Occupational Therapy to evaluate and treat. Please consult ID.     MISCELLANEOUS CARE:  Suction drain RUQ - Currently on day 1/3 of minimal drain output. Monitor drainage amount every shift, once drain output is less than 20 mL total for 2 days, ok to remove drain.     WOUND CARE ORDERS  None    Medications: Review discharge medications with patient and family and provide education.      Current Discharge Medication List        START taking these medications    Details   dextrose 5 % in water (D5W) PgBk 100 mL with piperacillin-tazobactam 4.5 gram SolR 4.5 g Inject 4.5 g into the vein every 8 (eight) hours. for 24 days      lactulose (CHRONULAC) 20 gram/30 mL Soln Take 8 mLs (5 g total) by mouth once daily.           CONTINUE these medications which have NOT CHANGED    Details   acetaminophen (TYLENOL) 325 MG tablet Take 650 mg by mouth 2 (two) times daily as needed for Pain.      busPIRone (BUSPAR) 15 MG tablet Take 7.5 mg by mouth 2 (two) times daily.      levothyroxine (SYNTHROID) 75 MCG tablet Take 75 mcg by mouth before breakfast.      nadoloL (CORGARD) 20 MG tablet Take 20 mg by mouth once daily.      pantoprazole (PROTONIX) 40 MG tablet Take 40 mg by mouth once daily.      sertraline (ZOLOFT) 50 MG tablet Take 50 mg by mouth once daily.           STOP taking these medications       hydroCHLOROthiazide (HYDRODIURIL) 25 MG tablet Comments:   Reason for Stopping:                  Immunizations Administered as of 8/17/2023       No immunizations on file.          Follow up:     - CT abdomen pelvis with contrast to be done in two week to assess abdominal abscess status.     This patient has had both covid vaccinations    Some patients may experience side effects  after vaccination.  These may include fever, headache, muscle or joint aches.  Most symptoms resolve with 24-48 hours and do not require urgent medical evaluation unless they persist for more than 72 hours or symptoms are concerning for an unrelated medical condition.          _________________________________  Cristian Wayne MD  08/17/2023

## 2023-08-14 NOTE — PROGRESS NOTES
[facility]  Progress Note    Patient Name: Gregg Page  MRN: [id number]  Patient Class: IP- Inpatient   Admission Date: 8/3/2023  Length of Stay: 11 days  Attending Physician: Oliverio Camacho, *  Primary Care Provider: No, Primary Doctor        Subjective:     Principal Problem:Intra-abdominal abscess        HPI:  Mr. Page is a 53 YOM with cirrhosis 2/2 to EtOH use and HCV, esophageal varices, GIB, HTN, hypothyroidism, MDD, hydrocephalous, cholelithiasis, who presented to OSH for abdominal pain and is coming to [facility] for IR evaluation of intra-thoracic hematoma and hepatology evaluation. Of note, patient was hospitalized 6/23 with similar presentation had MRCP done at that time (unavailable for review).  Abdominal pain radiates to the chest, onset 6 days prior to presentation at OSH. Patient denies melena, BRBPR, continued abdominal pain or CP. Patient states morning of transfer he started having tongue swelling and pain, feels dry, remitted with water, has dysphagia when his tongue is dry only, pain described as burning and located throughout tongue. No recent procedures/liver biopsy done. Patient also states for the past 1 week has had intractable hiccups which cause anxiety.     Patient hemodynamically stable. Labs concerning for new leukocytosis 17.9, normocytic anemia 12.6 ammonia 50, low Hct 32.2, elevated PT 16.6, INR 1.31, Troponin, lipase, U/A, BNP, CXR wnl    CT A/P with IV contrast done and shows upper abdominal fluid collection likely a hematoma, surrounds the liver, stomach, and spleen. Origin is not identified however may represent a variceal or hepatic hematoma. No active extravasation identified. Collection measures greater than 21.7 cm in transverse diameter and largest single pocket measures approx 7.5cm in maximal AP dimension.      Overview/Hospital Course:  Procal 0.59; blood cultures show NGTD. CT triple phase shows loculated ascites and no visualized liver  lesion. IR drainage of presumed intra-abdominal abscess 8/4/2023; pending cxs. Cytology reveals elevated WBC at 8030; aerobic cx negative, gram stain negative. ID consulted and recommended repeating cell count with continued abx. Repeat abdominal fluid cytology shows WBC increased to 11,000. CT obtained, pending results and IR recs. Repeat CT on 8/8 shows slight improvement in perihepatic fluid and mild increased free abdominopelvic fluid; d/c'd metronidazole and started zosyn. IR drain exchange took place 8/9, repeated cx's obtained. MRI 8/9 shows peripherally enhancing loculated fluid collection surrounding the left hepatic lobe with drainage catheter in place with no obvious connection to the biliary system. Differential includes abscess versus loculated ascites. WBC improved with pip/tazo. Awaiting LTAC placement. Today, first day of minimal output from drains. Pt is going to [facility] pending insurance approval.       Interval History: NAOE, VS, pt denies having any hallucinations.     Review of Systems   Constitutional: Negative.    HENT: Negative.     Eyes: Negative.    Respiratory: Negative.     Cardiovascular: Negative.    Gastrointestinal:  Positive for abdominal distention.   Genitourinary: Negative.    Musculoskeletal: Negative.    Skin:  Positive for wound.   Neurological: Negative.    Hematological: Negative.    Psychiatric/Behavioral: Negative.       Objective:     Vital Signs (Most Recent):  Temp: 97.9 °F (36.6 °C) (08/14/23 1105)  Pulse: (!) 55 (08/14/23 1105)  Resp: 16 (08/14/23 1105)  BP: 135/79 (08/14/23 1105)  SpO2: (!) 94 % (08/14/23 1105) Vital Signs (24h Range):  Temp:  [96 °F (35.6 °C)-98 °F (36.7 °C)] 97.9 °F (36.6 °C)  Pulse:  [55-77] 55  Resp:  [16-20] 16  SpO2:  [92 %-96 %] 94 %  BP: (118-135)/(65-79) 135/79     Weight: 92.3 kg (203 lb 7.8 oz)  Body mass index is 28.38 kg/m².    Intake/Output Summary (Last 24 hours) at 8/14/2023 1350  Last data filed at 8/14/2023 0504  Gross per 24  hour   Intake --   Output 0 ml   Net 0 ml         Physical Exam  Constitutional:       Appearance: Normal appearance.   HENT:      Head: Normocephalic and atraumatic.      Nose: Nose normal.      Mouth/Throat:      Mouth: Mucous membranes are moist.   Eyes:      Pupils: Pupils are equal, round, and reactive to light.   Cardiovascular:      Rate and Rhythm: Normal rate and regular rhythm.      Pulses: Normal pulses.      Heart sounds: Normal heart sounds.   Pulmonary:      Effort: Pulmonary effort is normal.      Breath sounds: Normal breath sounds.   Abdominal:      General: Bowel sounds are normal. There is distension.      Palpations: Abdomen is soft.   Skin:     General: Skin is warm.      Comments: Right side drain   Neurological:      Mental Status: He is alert. Mental status is at baseline.   Psychiatric:         Mood and Affect: Mood normal.         Behavior: Behavior normal.         Thought Content: Thought content normal.         Judgment: Judgment normal.             Significant Labs: All pertinent labs within the past 24 hours have been reviewed.    Significant Imaging: I have reviewed all pertinent imaging results/findings within the past 24 hours.      Assessment/Plan:      * Intra-abdominal abscess  Mr. Page is a 53 YOM with cirrhosis 2/2 to EtOH use and HCV, esophageal varices, GIB, HTN, hypothyroidism, MDD, hydrocephalous, cholelithiasis, who presented to OSH for abdominal pain and is coming to [facility] for IR evaluation of intra-thoracic hematoma and hepatology evaluation. Presented for abdominal pain which has now resolved. CT A/P with IV contrast done and shows upper abdominal fluid collection likely a hematoma, surrounds the liver, stomach, and spleen. Origin is not identified however may represent a variceal or hepatic hematoma. No active extravasation identified. Patient transferred for IR and hepatology evaluation. History of past varices ligation. Has 1.1 cm hypoattenuating lesion in left  "hepatic lobe.     CT triple phase: loculated ascites; no mass lesions of the liver identified, though arterial phase not assessed.  Ascites cultures: negative gram stain and aerobic culture.  WBC 8030 > 87742.    Repeat CT: slight improvement in perihepatic fluid; mild increased free abdominopelvic fluid  8/8: d/c'd metronidazole and started zosyn  8/9: drain exchange per IR, repeat cx's    PLAN:  IR consulted, appreciate recs  - monitor drain output  - Flush drain: today, day 1 of 3 of minimal fluid output.   - follow up cultures  - Will plan to repeat CT in about 2 weeks to reassess collection    ID consulted, appreciate recs   - IV piperacillin/tazobactam 13.5 grams IV q8 hours or 12 grams IV via continuous infusion daily, plan for four weeks of therapy for now, tentative EOT 09/07/2023        - If discharged to [facility], recommend ID consult on admission. If discharged to outside LTAC, will need ID consult if available    Hypocalcemia    Recent Labs   Lab 08/11/23  0302   CALCIUM 8.0*       No results for input(s): "CAION" in the last 24 hours.      LUISA (iron deficiency anemia)  Transfuse hgb greater than 7      Hypokalemia  Repletion daily      Leukocytosis  RESOLVED    Patient with leukocytosis that was present at OSH at 17.9 and downtrending to 15.24. patient otherwise is hemodynamically stable. Low concern for sepsis at this time however high chance for decompensation given presence of hematoma    Unclear etiology consider infected hematoma vs stress    - continue to monitor     HTN (hypertension)  Patient normotensive with hematoma present. Given risk for possible hemorrhagic shock will hold home BP meds     - hold home amlodipine 5   - hold home HCTZ 25     Gallstones  Cholelithiasis without cystitis   Seen on imaging 8/2/23    - monitor ALP and bili    Hiccups  Patient with new hiccups for one week while at OSH. possibly 2/2 from loculated ascites causing irritation to phrenic nerve     Improved with " Thorazine. Significantly better now.    PLAN:  - Thorazine PRN  -baclofen prn breakthrough    HCV (hepatitis C virus)  Per OSH records patient with history HCV          MDD (major depressive disorder)  - continue home sertraline 50 mg   - continue buspirone 7.5 mg BID    Cirrhosis    MELD 3.0: 11 at 8/12/2023  3:35 AM  MELD-Na: 9 at 8/12/2023  3:35 AM  Calculated from:  Serum Creatinine: 0.6 mg/dL (Using min of 1 mg/dL) at 8/12/2023  3:35 AM  Serum Sodium: 140 mmol/L (Using max of 137 mmol/L) at 8/12/2023  3:35 AM  Total Bilirubin: 0.9 mg/dL (Using min of 1 mg/dL) at 8/12/2023  3:35 AM  Serum Albumin: 2.3 g/dL at 8/12/2023  3:35 AM  INR(ratio): 1.3 at 8/12/2023  3:35 AM  Age at listing (hypothetical): 53 years  Sex: Male at 8/12/2023  3:35 AM    Ammonia 67, concern for hepatic encephalopathy 2/2 report of one episode of hallucinations per wife. No subsequent episodes.    PLAN:  - Referred to outpatient hepatologist  - patient on nadolol at OSH, started on propranolol this admission  - continue pantoprazole 40 mg from OSH   - delirium precautions  - lactulose    Hypothyroidism  - continue home synthroid 75 mcg    Normocytic anemia  Patient presents with anemia 12.1 mcv 87 (normocytic anemia 12.6 at OSH). No known baseline hemoglobin. Patient with new hematoma formation.     Iron panel: iron 26, TIBC 250, transferrin 169, Ferritin 213.     8/8: thrombocytes 94, lovenox held  8/9: plts 108  8/14: Hgb 12.6, ptls: 157      VTE Risk Mitigation (From admission, onward)         Ordered     enoxaparin injection 40 mg  Daily         08/07/23 1005     IP VTE LOW RISK PATIENT  Once         08/07/23 1005     Place sequential compression device  Until discontinued         08/03/23 0108                Discharge Planning   DONIS: 8/14/2023     Code Status: Full Code   Is the patient medically ready for discharge?: Yes    Reason for patient still in hospital (select all that apply): Treatment  Discharge Plan A: Home with family                   Cristian Wayne MD  Department of [facility] Medicine   [facility] - Surgery

## 2023-08-14 NOTE — PLAN OF CARE
SW met with pt at bedside to discuss accepting LTAC ([facility]). Pt reports that he plans to d/c home with IV-ABX. Pt informed SW that the MD was agreeable. Pt reports no Infusion preference, would prefer to admit to suite. SW informed PA and CM.    Pt's MD's via secure chat to discuss with pt, LTAC placement. SW to follow and assist as needed.    3:27 PM  SW spoke to the pt and pt's spouse (Via phone call) to discuss LTAC's. Pt selected [facility]. SW informed Jacob johnson/ LTAC #[phone], whom will submit auth. JANEL informed CM and medical team via secure chat.     Faye Weaver LMSW  Case Management   [facility]   Ext. [phone]

## 2023-08-14 NOTE — ASSESSMENT & PLAN NOTE
Mr. Page is a 53 YOM with cirrhosis 2/2 to EtOH use and HCV, esophageal varices, GIB, HTN, hypothyroidism, MDD, hydrocephalous, cholelithiasis, who presented to OSH for abdominal pain and is coming [place] [facility] [place] of intra-thoracic hematoma and hepatology evaluation. Presented for abdominal pain which has now resolved. CT A/P with IV contrast done and shows upper abdominal fluid collection likely a hematoma, surrounds the liver, stomach, and spleen. Origin is not identified however may represent a variceal or hepatic hematoma. No active extravasation identified. Patient transferred for IR and hepatology evaluation. History of past varices ligation. Has 1.1 cm hypoattenuating lesion in left hepatic lobe.     CT triple phase: loculated ascites; no mass lesions of the liver identified, though arterial phase not assessed.  Ascites cultures: negative gram stain and aerobic culture.  WBC 8030 > 49854.    Repeat CT: slight improvement in perihepatic fluid; mild increased free abdominopelvic fluid  8/8: d/c'd metronidazole and started zosyn  8/9: drain exchange per IR, repeat cx's    PLAN:  IR consulted, appreciate recs  - monitor drain output  - Flush drain: today, day 1 of 3 of minimal fluid output.   - follow up cultures  - Will plan to repeat CT in about 2 weeks to reassess collection    ID consulted, appreciate recs   - IV piperacillin/tazobactam 13.5 grams IV q8 hours or 12 grams IV via continuous infusion daily, plan for four weeks of therapy for now, tentative EOT 09/07/2023        - If discharged to [facility], recommend ID consult on admission. If discharged to outside LTAC, will need ID consult if available

## 2023-08-15 LAB
ALBUMIN SERPL BCP-MCNC: 2.2 G/DL (ref 3.5–5.2)
ALP SERPL-CCNC: 86 U/L (ref 55–135)
ALT SERPL W/O P-5'-P-CCNC: 13 U/L (ref 10–44)
ANION GAP SERPL CALC-SCNC: 5 MMOL/L (ref 8–16)
AST SERPL-CCNC: 25 U/L (ref 10–40)
BASOPHILS # BLD AUTO: 0.09 K/UL (ref 0–0.2)
BASOPHILS NFR BLD: 1 % (ref 0–1.9)
BILIRUB SERPL-MCNC: 1 MG/DL (ref 0.1–1)
BUN SERPL-MCNC: 7 MG/DL (ref 6–20)
CALCIUM SERPL-MCNC: 8.1 MG/DL (ref 8.7–10.5)
CHLORIDE SERPL-SCNC: 110 MMOL/L (ref 95–110)
CO2 SERPL-SCNC: 24 MMOL/L (ref 23–29)
CREAT SERPL-MCNC: 0.6 MG/DL (ref 0.5–1.4)
DIFFERENTIAL METHOD: ABNORMAL
EOSINOPHIL # BLD AUTO: 0.3 K/UL (ref 0–0.5)
EOSINOPHIL NFR BLD: 3.2 % (ref 0–8)
ERYTHROCYTE [DISTWIDTH] IN BLOOD BY AUTOMATED COUNT: 15.3 % (ref 11.5–14.5)
EST. GFR  (NO RACE VARIABLE): >60 ML/MIN/1.73 M^2
GLUCOSE SERPL-MCNC: 81 MG/DL (ref 70–110)
HCT VFR BLD AUTO: 37.9 % (ref 40–54)
HGB BLD-MCNC: 12.3 G/DL (ref 14–18)
IMM GRANULOCYTES # BLD AUTO: 0.03 K/UL (ref 0–0.04)
IMM GRANULOCYTES NFR BLD AUTO: 0.3 % (ref 0–0.5)
INR PPP: 1.2 (ref 0.8–1.2)
LYMPHOCYTES # BLD AUTO: 1.7 K/UL (ref 1–4.8)
LYMPHOCYTES NFR BLD: 17.6 % (ref 18–48)
MAGNESIUM SERPL-MCNC: 1.7 MG/DL (ref 1.6–2.6)
MCH RBC QN AUTO: 29.4 PG (ref 27–31)
MCHC RBC AUTO-ENTMCNC: 32.5 G/DL (ref 32–36)
MCV RBC AUTO: 91 FL (ref 82–98)
MONOCYTES # BLD AUTO: 1.2 K/UL (ref 0.3–1)
MONOCYTES NFR BLD: 12.3 % (ref 4–15)
NEUTROPHILS # BLD AUTO: 6.1 K/UL (ref 1.8–7.7)
NEUTROPHILS NFR BLD: 65.6 % (ref 38–73)
NRBC BLD-RTO: 0 /100 WBC
PHOSPHATE SERPL-MCNC: 3.3 MG/DL (ref 2.7–4.5)
PLATELET # BLD AUTO: 148 K/UL (ref 150–450)
PMV BLD AUTO: 10.9 FL (ref 9.2–12.9)
POTASSIUM SERPL-SCNC: 3.5 MMOL/L (ref 3.5–5.1)
PROT SERPL-MCNC: 5.9 G/DL (ref 6–8.4)
PROTHROMBIN TIME: 12.8 SEC (ref 9–12.5)
RBC # BLD AUTO: 4.19 M/UL (ref 4.6–6.2)
SODIUM SERPL-SCNC: 139 MMOL/L (ref 136–145)
WBC # BLD AUTO: 9.36 K/UL (ref 3.9–12.7)

## 2023-08-15 PROCEDURE — 99232 SBSQ HOSP IP/OBS MODERATE 35: CPT | Mod: ,,, | Performed by: STUDENT IN AN ORGANIZED HEALTH CARE EDUCATION/TRAINING PROGRAM

## 2023-08-15 PROCEDURE — 99232 PR SUBSEQUENT HOSPITAL CARE,LEVL II: ICD-10-PCS | Mod: ,,, | Performed by: STUDENT IN AN ORGANIZED HEALTH CARE EDUCATION/TRAINING PROGRAM

## 2023-08-15 PROCEDURE — 80053 COMPREHEN METABOLIC PANEL: CPT

## 2023-08-15 PROCEDURE — 85025 COMPLETE CBC W/AUTO DIFF WBC: CPT

## 2023-08-15 PROCEDURE — 84100 ASSAY OF PHOSPHORUS: CPT

## 2023-08-15 PROCEDURE — A4216 STERILE WATER/SALINE, 10 ML: HCPCS | Performed by: STUDENT IN AN ORGANIZED HEALTH CARE EDUCATION/TRAINING PROGRAM

## 2023-08-15 PROCEDURE — 11000001 HC ACUTE MED/SURG PRIVATE ROOM

## 2023-08-15 PROCEDURE — 25000003 PHARM REV CODE 250: Performed by: STUDENT IN AN ORGANIZED HEALTH CARE EDUCATION/TRAINING PROGRAM

## 2023-08-15 PROCEDURE — 36415 COLL VENOUS BLD VENIPUNCTURE: CPT | Performed by: STUDENT IN AN ORGANIZED HEALTH CARE EDUCATION/TRAINING PROGRAM

## 2023-08-15 PROCEDURE — C1751 CATH, INF, PER/CENT/MIDLINE: HCPCS

## 2023-08-15 PROCEDURE — 85610 PROTHROMBIN TIME: CPT | Performed by: STUDENT IN AN ORGANIZED HEALTH CARE EDUCATION/TRAINING PROGRAM

## 2023-08-15 PROCEDURE — 63600175 PHARM REV CODE 636 W HCPCS: Performed by: STUDENT IN AN ORGANIZED HEALTH CARE EDUCATION/TRAINING PROGRAM

## 2023-08-15 PROCEDURE — 25000003 PHARM REV CODE 250

## 2023-08-15 PROCEDURE — 83735 ASSAY OF MAGNESIUM: CPT

## 2023-08-15 RX ADMIN — Medication 10 ML: at 09:08

## 2023-08-15 RX ADMIN — PIPERACILLIN SODIUM AND TAZOBACTAM SODIUM 4.5 G: 4; .5 INJECTION, POWDER, FOR SOLUTION INTRAVENOUS at 10:08

## 2023-08-15 RX ADMIN — BUSPIRONE HYDROCHLORIDE 7.5 MG: 5 TABLET ORAL at 09:08

## 2023-08-15 RX ADMIN — PANTOPRAZOLE SODIUM 40 MG: 40 TABLET, DELAYED RELEASE ORAL at 08:08

## 2023-08-15 RX ADMIN — PIPERACILLIN SODIUM AND TAZOBACTAM SODIUM 4.5 G: 4; .5 INJECTION, POWDER, FOR SOLUTION INTRAVENOUS at 05:08

## 2023-08-15 RX ADMIN — PROPRANOLOL HYDROCHLORIDE 10 MG: 10 TABLET ORAL at 09:08

## 2023-08-15 RX ADMIN — BUSPIRONE HYDROCHLORIDE 7.5 MG: 5 TABLET ORAL at 08:08

## 2023-08-15 RX ADMIN — PIPERACILLIN SODIUM AND TAZOBACTAM SODIUM 4.5 G: 4; .5 INJECTION, POWDER, FOR SOLUTION INTRAVENOUS at 12:08

## 2023-08-15 RX ADMIN — ENOXAPARIN SODIUM 40 MG: 40 INJECTION SUBCUTANEOUS at 05:08

## 2023-08-15 RX ADMIN — SERTRALINE HYDROCHLORIDE 25 MG: 25 TABLET ORAL at 08:08

## 2023-08-15 RX ADMIN — LEVOTHYROXINE SODIUM 75 MCG: 75 TABLET ORAL at 05:08

## 2023-08-15 RX ADMIN — PROPRANOLOL HYDROCHLORIDE 10 MG: 10 TABLET ORAL at 08:08

## 2023-08-15 NOTE — PROGRESS NOTES
"  [facility] - Surgery  Adult Nutrition  Consult Note    SUMMARY     Recommendations    1) Continue regular diet  2) If intake <50% add boost plus TID  3) Following    Goals: Meet % een/epn by next Rd f/u  Nutrition Goal Status: new  Communication of RD Recs: other (comment) (POC)    Assessment and Plan    No new Assessment & Plan notes have been filed under this hospital service since the last note was generated.  Service: Nutrition      Reason for Assessment    Reason For Assessment: length of stay  Diagnosis: gastrointestinal disease  Relevant Medical History: cirrhosis 2/2 to EtOH use and HCV, HTN,  hypothyroidism, GIB  Interdisciplinary Rounds: did not attend  General Information Comments: LOS x 12. Unable to speak to pt today. Expected d/c today. Noted with % intake of meals. Wt appears stable. Following    Nutrition Discharge Planning: adeqaute intake    Nutrition Risk Screen    Nutrition Risk Screen: no indicators present    Nutrition/Diet History    Spiritual, Cultural Beliefs, Mormon Practices, Values that Affect Care: no  Food Allergies: NKFA    Anthropometrics    Temp: 98 °F (36.7 °C)  Height Method: Stated  Height: 5' 11" (180.3 cm)  Height (inches): 71 in  Weight Method: Bed Scale  Weight: 92.3 kg (203 lb 7.8 oz)  Weight (lb): 203.49 lb  Ideal Body Weight (IBW), Male: 172 lb  % Ideal Body Weight, Male (lb): 118.31 %  BMI (Calculated): 28.4       Lab/Procedures/Meds    Pertinent Labs Reviewed: reviewed  Pertinent Labs Comments: H/H: 11.4/35.7, MCHC: 31.9  Pertinent Medications Reviewed: reviewed  Pertinent Medications Comments: enoxaparin, lactulose, levothyroxine, pantoprazole, ABX      Estimated/Assessed Needs    Weight Used For Calorie Calculations: 92.1 kg (203 lb)  Energy Calorie Requirements (kcal): 1787 (MSJ)  Energy Need Method: [place]-St Jeor  Protein Requirements: 110 (1.2 g/kg)  Weight Used For Protein Calculations: 92.1 kg (203 lb)     Estimated Fluid Requirement Method: RDA " Method  RDA Method (mL): 1787         Nutrition Prescription Ordered    Current Diet Order: Regular    Evaluation of Received Nutrient/Fluid Intake    I/O: +696 ml since admit  Comments: LBM 8/14  Tolerance: tolerating  % Intake of Estimated Energy Needs: 75 - 100 %  % Meal Intake: 75 - 100 %    Nutrition Risk    Level of Risk/Frequency of Follow-up: low (f/u 1x/week)       Monitor and Evaluation    Food and Nutrient Intake: energy intake, food and beverage intake  Food and Nutrient Adminstration: enteral and parenteral nutrition administration, diet order  Physical Activity and Function: nutrition-related ADLs and IADLs  Anthropometric Measurements: height/length, weight, weight change, body mass index  Biochemical Data, Medical Tests and Procedures: electrolyte and renal panel, gastrointestinal profile, glucose/endocrine profile, inflammatory profile, lipid profile       Nutrition Follow-Up    RD Follow-up?: Yes    Clementine Mckenzie RD, LDN

## 2023-08-15 NOTE — PLAN OF CARE
Recommendations     1) Continue regular diet  2) If intake <50% add boost plus TID  3) Following     Goals: Meet % een/epn by next Rd f/u  Nutrition Goal Status: new  Communication of RD Recs: other (comment) (POC

## 2023-08-15 NOTE — PLAN OF CARE
Patient Accepted to [facility], authorization remains pending, reported by Jacob. JANEL to follow.    2:26 PM  JANEL called Jacob johnson/ [facility] #[phone] to follow up on authorization,reports remains pending. JANEL escalated authorization request to  supervisors.    Faye Weaver LMSW  Case Management   [facility]   Ext. [phone]

## 2023-08-15 NOTE — ASSESSMENT & PLAN NOTE
"  Recent Labs   Lab 08/15/23  0539   CALCIUM 8.1*       No results for input(s): "CHERI" in the last 24 hours.    "

## 2023-08-15 NOTE — ASSESSMENT & PLAN NOTE
Patient presents with anemia 12.1 mcv 87 (normocytic anemia 12.6 at OSH). No known baseline hemoglobin. Patient with new hematoma formation.     Iron panel: iron 26, TIBC 250, transferrin 169, Ferritin 213.     8/8: thrombocytes 94, lovenox held  8/9: plts 108  8/14: Hgb 12.6, plts: 157  8/15: Hgb 12.3, plts: 148

## 2023-08-15 NOTE — PLAN OF CARE
Patient up in chair. No distress or discomfort noted. Vital signs obtained and stable. PM assessment completed. CATRACHITA drain irrigated. 5 mL output. Patient back to bed. Bed in lowest position. Wheels locked. Belongings in reach. Call light in reach. POC ongoing.     Problem: Adult Inpatient Plan of Care  Goal: Plan of Care Review  Outcome: Ongoing, Progressing  Goal: Patient-Specific Goal (Individualized)  Outcome: Ongoing, Progressing  Goal: Absence of Hospital-Acquired Illness or Injury  Outcome: Ongoing, Progressing  Goal: Optimal Comfort and Wellbeing  Outcome: Ongoing, Progressing  Goal: Readiness for Transition of Care  Outcome: Ongoing, Progressing     Problem: Bleeding (Surgery Nonspecified)  Goal: Absence of Bleeding  Outcome: Ongoing, Progressing     Problem: Bowel Motility Impaired (Surgery Nonspecified)  Goal: Effective Bowel Elimination  Outcome: Ongoing, Progressing     Problem: Fluid and Electrolyte Imbalance (Surgery Nonspecified)  Goal: Fluid and Electrolyte Balance  Outcome: Ongoing, Progressing     Problem: Glycemic Control Impaired (Surgery Nonspecified)  Goal: Blood Glucose Level Within Targeted Range  Outcome: Ongoing, Progressing     Problem: Infection (Surgery Nonspecified)  Goal: Absence of Infection Signs and Symptoms  Outcome: Ongoing, Progressing     Problem: Ongoing Anesthesia Effects (Surgery Nonspecified)  Goal: Anesthesia/Sedation Recovery  Outcome: Ongoing, Progressing     Problem: Pain (Surgery Nonspecified)  Goal: Acceptable Pain Control  Outcome: Ongoing, Progressing     Problem: Postoperative Nausea and Vomiting (Surgery Nonspecified)  Goal: Nausea and Vomiting Relief  Outcome: Ongoing, Progressing     Problem: Postoperative Urinary Retention (Surgery Nonspecified)  Goal: Effective Urinary Elimination  Outcome: Ongoing, Progressing     Problem: Respiratory Compromise (Surgery Nonspecified)  Goal: Effective Oxygenation and Ventilation  Outcome: Ongoing, Progressing     Problem:  Infection  Goal: Absence of Infection Signs and Symptoms  Outcome: Ongoing, Progressing

## 2023-08-15 NOTE — PROGRESS NOTES
[facility] Medicine  Progress Note    Patient Name: Gregg Page  MRN: [id number]  Patient Class: IP- Inpatient   Admission Date: 8/3/2023  Length of Stay: 12 days  Attending Physician: Oliverio Camacho, *  Primary Care Provider: No, Primary Doctor        Subjective:     Principal Problem:Intra-abdominal abscess        HPI:  Mr. Page is a 53 YOM with cirrhosis 2/2 to EtOH use and HCV, esophageal varices, GIB, HTN, hypothyroidism, MDD, hydrocephalous, cholelithiasis, who presented to OSH for abdominal pain and is coming to [facility] for IR evaluation of intra-thoracic hematoma and hepatology evaluation. Of note, patient was hospitalized 6/23 with similar presentation had MRCP done at that time (unavailable for review).  Abdominal pain radiates to the chest, onset 6 days prior to presentation at OSH. Patient denies melena, BRBPR, continued abdominal pain or CP. Patient states morning of transfer he started having tongue swelling and pain, feels dry, remitted with water, has dysphagia when his tongue is dry only, pain described as burning and located throughout tongue. No recent procedures/liver biopsy done. Patient also states for the past 1 week has had intractable hiccups which cause anxiety.     Patient hemodynamically stable. Labs concerning for new leukocytosis 17.9, normocytic anemia 12.6 ammonia 50, low Hct 32.2, elevated PT 16.6, INR 1.31, Troponin, lipase, U/A, BNP, CXR wnl    CT A/P with IV contrast done and shows upper abdominal fluid collection likely a hematoma, surrounds the liver, stomach, and spleen. Origin is not identified however may represent a variceal or hepatic hematoma. No active extravasation identified. Collection measures greater than 21.7 cm in transverse diameter and largest single pocket measures approx 7.5cm in maximal AP dimension.      Overview/Hospital Course:  Procal 0.59; blood cultures show NGTD. CT triple phase shows loculated ascites and no visualized liver  lesion. IR drainage of presumed intra-abdominal abscess 8/4/2023; pending cxs. Cytology reveals elevated WBC at 8030; aerobic cx negative, gram stain negative. ID consulted and recommended repeating cell count with continued abx. Repeat abdominal fluid cytology shows WBC increased to 11,000. CT obtained, pending results and IR recs. Repeat CT on 8/8 shows slight improvement in perihepatic fluid and mild increased free abdominopelvic fluid; d/c'd metronidazole and started zosyn. IR drain exchange took place 8/9, repeated cx's obtained. MRI 8/9 shows peripherally enhancing loculated fluid collection surrounding the left hepatic lobe with drainage catheter in place with no obvious connection to the biliary system. Differential includes abscess versus loculated ascites. WBC improved with pip/tazo. Awaiting LTAC placement. Today, first day of minimal output from drains. Pt is going to [facility] pending insurance approval. Pt admitted at [facility], pending insurance approval. Drains had 5 cc of drainage, day 2/3 of <20 cc drainage.       Interval History: NAOE, VSS, patient resting comfortably. Drain in place with minimal output.     Review of Systems   Constitutional: Negative.    HENT: Negative.     Respiratory: Negative.     Cardiovascular: Negative.    Gastrointestinal:  Positive for abdominal distention and abdominal pain.   Genitourinary: Negative.    Musculoskeletal: Negative.    Skin:  Positive for rash.   Neurological: Negative.    Psychiatric/Behavioral: Negative.       Objective:     Vital Signs (Most Recent):  Temp: 97.9 °F (36.6 °C) (08/15/23 0734)  Pulse: 71 (08/15/23 0734)  Resp: 17 (08/15/23 0734)  BP: 122/71 (08/15/23 0734)  SpO2: 96 % (08/15/23 0734) Vital Signs (24h Range):  Temp:  [97.2 °F (36.2 °C)-98.4 °F (36.9 °C)] 97.9 °F (36.6 °C)  Pulse:  [66-80] 71  Resp:  [16-20] 17  SpO2:  [95 %-96 %] 96 %  BP: (111-139)/(60-71) 122/71     Weight: 92.3 kg (203 lb 7.8 oz)  Body mass index is  28.38 kg/m².    Intake/Output Summary (Last 24 hours) at 8/15/2023 1149  Last data filed at 8/15/2023 0530  Gross per 24 hour   Intake 780 ml   Output 20 ml   Net 760 ml         Physical Exam  Constitutional:       Appearance: Normal appearance.   HENT:      Head: Normocephalic and atraumatic.      Nose: Nose normal.      Mouth/Throat:      Mouth: Mucous membranes are moist.   Cardiovascular:      Rate and Rhythm: Normal rate and regular rhythm.      Pulses: Normal pulses.      Heart sounds: Normal heart sounds.   Pulmonary:      Effort: Pulmonary effort is normal.      Breath sounds: Normal breath sounds.   Abdominal:      General: Bowel sounds are normal.      Tenderness: There is abdominal tenderness.   Musculoskeletal:         General: Normal range of motion.      Cervical back: Normal range of motion.   Skin:     Findings: Rash present.   Neurological:      General: No focal deficit present.      Mental Status: He is alert.   Psychiatric:         Mood and Affect: Mood normal.         Behavior: Behavior normal.         Thought Content: Thought content normal.         Judgment: Judgment normal.             Significant Labs: All pertinent labs within the past 24 hours have been reviewed.    Significant Imaging: I have reviewed all pertinent imaging results/findings within the past 24 hours.      Assessment/Plan:      * Intra-abdominal abscess  Mr. Page is a 53 YOM with cirrhosis 2/2 to EtOH use and HCV, esophageal varices, GIB, HTN, hypothyroidism, MDD, hydrocephalous, cholelithiasis, who presented to OSH for abdominal pain and is coming to [facility] for IR evaluation of intra-thoracic hematoma and hepatology evaluation. Presented for abdominal pain which has now resolved. CT A/P with IV contrast done and shows upper abdominal fluid collection likely a hematoma, surrounds the liver, stomach, and spleen. Origin is not identified however may represent a variceal or hepatic hematoma. No active extravasation identified.  "Patient transferred for IR and hepatology evaluation. History of past varices ligation. Has 1.1 cm hypoattenuating lesion in left hepatic lobe.     CT triple phase: loculated ascites; no mass lesions of the liver identified, though arterial phase not assessed.  Ascites cultures: negative gram stain and aerobic culture.  WBC 8030 > 89310.    Repeat CT: slight improvement in perihepatic fluid; mild increased free abdominopelvic fluid  8/8: d/c'd metronidazole and started zosyn  8/9: drain exchange per IR, repeat cx's    PLAN:  IR consulted, appreciate recs  - monitor drain output  - Flush drain: today, day 2 of 3 of minimal fluid output.   - follow up cultures  - Will plan to repeat CT in about 2 weeks to reassess collection    ID consulted, appreciate recs   - IV piperacillin/tazobactam 13.5 grams IV q8 hours or 12 grams IV via continuous infusion daily, plan for four weeks of therapy for now, tentative EOT 09/07/2023        - If discharged to [facility], recommend ID consult on admission. If discharged to outside LTAC, will need ID consult if available    Hypocalcemia    Recent Labs   Lab 08/15/23  0539   CALCIUM 8.1*       No results for input(s): "CAION" in the last 24 hours.      LUISA (iron deficiency anemia)  Transfuse hgb greater than 7      Hypokalemia  Repletion daily      Leukocytosis  RESOLVED    Patient with leukocytosis that was present at OSH at 17.9 and downtrending to 15.24. patient otherwise is hemodynamically stable. Low concern for sepsis at this time however high chance for decompensation given presence of hematoma    Unclear etiology consider infected hematoma vs stress    - continue to monitor     HTN (hypertension)  Patient normotensive with hematoma present. Given risk for possible hemorrhagic shock will hold home BP meds     - hold home amlodipine 5   - hold home HCTZ 25     Gallstones  Cholelithiasis without cystitis   Seen on imaging 8/2/23    - monitor ALP and bili    Hiccups  Patient with " new hiccups for one week while at OSH. possibly 2/2 from loculated ascites causing irritation to phrenic nerve     Improved with Thorazine. Significantly better now.    PLAN:  -RESOLVED     HCV (hepatitis C virus)  Per OSH records patient with history HCV          MDD (major depressive disorder)  - continue home sertraline 50 mg   - continue buspirone 7.5 mg BID    Cirrhosis    MELD 3.0: 10 at 8/15/2023  5:39 AM  MELD-Na: 8 at 8/15/2023  5:39 AM  Calculated from:  Serum Creatinine: 0.6 mg/dL (Using min of 1 mg/dL) at 8/15/2023  5:39 AM  Serum Sodium: 139 mmol/L (Using max of 137 mmol/L) at 8/15/2023  5:39 AM  Total Bilirubin: 1.0 mg/dL at 8/15/2023  5:39 AM  Serum Albumin: 2.2 g/dL at 8/15/2023  5:39 AM  INR(ratio): 1.2 at 8/15/2023  5:39 AM  Age at listing (hypothetical): 53 years  Sex: Male at 8/15/2023  5:39 AM    Ammonia 67, concern for hepatic encephalopathy 2/2 report of one episode of hallucinations per wife. No subsequent episodes.    PLAN:  - Referred to outpatient hepatologist  - patient on nadolol at OSH, started on propranolol this admission  - continue pantoprazole 40 mg from OSH   -Delirium resolved       Hypothyroidism  - continue home synthroid 75 mcg    Normocytic anemia  Patient presents with anemia 12.1 mcv 87 (normocytic anemia 12.6 at OSH). No known baseline hemoglobin. Patient with new hematoma formation.     Iron panel: iron 26, TIBC 250, transferrin 169, Ferritin 213.     8/8: thrombocytes 94, lovenox held  8/9: plts 108  8/14: Hgb 12.6, plts: 157  8/15: Hgb 12.3, plts: 148      VTE Risk Mitigation (From admission, onward)         Ordered     enoxaparin injection 40 mg  Daily         08/07/23 1005     IP VTE LOW RISK PATIENT  Once         08/07/23 1005     Place sequential compression device  Until discontinued         08/03/23 0108                Discharge Planning   DONIS: 8/15/2023     Code Status: Full Code   Is the patient medically ready for discharge?: Yes    Reason for patient still in  hospital (select all that apply): Treatment  Discharge Plan A: Home with family                  Cristian Wayne MD  Department of Hospital Medicine   [facility] - Surgery

## 2023-08-15 NOTE — SUBJECTIVE & OBJECTIVE
Interval History: NAOE, VSS, patient resting comfortably. Drain in place with minimal output.     Review of Systems   Constitutional: Negative.    HENT: Negative.     Respiratory: Negative.     Cardiovascular: Negative.    Gastrointestinal:  Positive for abdominal distention and abdominal pain.   Genitourinary: Negative.    Musculoskeletal: Negative.    Skin:  Positive for rash.   Neurological: Negative.    Psychiatric/Behavioral: Negative.       Objective:     Vital Signs (Most Recent):  Temp: 97.9 °F (36.6 °C) (08/15/23 0734)  Pulse: 71 (08/15/23 0734)  Resp: 17 (08/15/23 0734)  BP: 122/71 (08/15/23 0734)  SpO2: 96 % (08/15/23 0734) Vital Signs (24h Range):  Temp:  [97.2 °F (36.2 °C)-98.4 °F (36.9 °C)] 97.9 °F (36.6 °C)  Pulse:  [66-80] 71  Resp:  [16-20] 17  SpO2:  [95 %-96 %] 96 %  BP: (111-139)/(60-71) 122/71     Weight: 92.3 kg (203 lb 7.8 oz)  Body mass index is 28.38 kg/m².    Intake/Output Summary (Last 24 hours) at 8/15/2023 1149  Last data filed at 8/15/2023 0530  Gross per 24 hour   Intake 780 ml   Output 20 ml   Net 760 ml         Physical Exam  Constitutional:       Appearance: Normal appearance.   HENT:      Head: Normocephalic and atraumatic.      Nose: Nose normal.      Mouth/Throat:      Mouth: Mucous membranes are moist.   Cardiovascular:      Rate and Rhythm: Normal rate and regular rhythm.      Pulses: Normal pulses.      Heart sounds: Normal heart sounds.   Pulmonary:      Effort: Pulmonary effort is normal.      Breath sounds: Normal breath sounds.   Abdominal:      General: Bowel sounds are normal.      Tenderness: There is abdominal tenderness.   Musculoskeletal:         General: Normal range of motion.      Cervical back: Normal range of motion.   Skin:     Findings: Rash present.   Neurological:      General: No focal deficit present.      Mental Status: He is alert.   Psychiatric:         Mood and Affect: Mood normal.         Behavior: Behavior normal.         Thought Content: Thought  content normal.         Judgment: Judgment normal.             Significant Labs: All pertinent labs within the past 24 hours have been reviewed.    Significant Imaging: I have reviewed all pertinent imaging results/findings within the past 24 hours.

## 2023-08-15 NOTE — ASSESSMENT & PLAN NOTE
MELD 3.0: 10 at 8/15/2023  5:39 AM  MELD-Na: 8 at 8/15/2023  5:39 AM  Calculated from:  Serum Creatinine: 0.6 mg/dL (Using min of 1 mg/dL) at 8/15/2023  5:39 AM  Serum Sodium: 139 mmol/L (Using max of 137 mmol/L) at 8/15/2023  5:39 AM  Total Bilirubin: 1.0 mg/dL at 8/15/2023  5:39 AM  Serum Albumin: 2.2 g/dL at 8/15/2023  5:39 AM  INR(ratio): 1.2 at 8/15/2023  5:39 AM  Age at listing (hypothetical): 53 years  Sex: Male at 8/15/2023  5:39 AM    Ammonia 67, concern for hepatic encephalopathy 2/2 report of one episode of hallucinations per wife. No subsequent episodes.    PLAN:  - Referred to outpatient hepatologist  - patient on nadolol at OSH, started on propranolol this admission  - continue pantoprazole 40 mg from OSH   -Delirium resolved

## 2023-08-15 NOTE — ASSESSMENT & PLAN NOTE
Mr. Page is a 53 YOM with cirrhosis 2/2 to EtOH use and HCV, esophageal varices, GIB, HTN, hypothyroidism, MDD, hydrocephalous, cholelithiasis, who presented to OSH for abdominal pain and is [place] [facility] [place] of intra-thoracic hematoma and hepatology evaluation. Presented for abdominal pain which has now resolved. CT A/P with IV contrast done and shows upper abdominal fluid collection likely a hematoma, surrounds the liver, stomach, and spleen. Origin is not identified however may represent a variceal or hepatic hematoma. No active extravasation identified. Patient transferred for IR and hepatology evaluation. History of past varices ligation. Has 1.1 cm hypoattenuating lesion in left hepatic lobe.     CT triple phase: loculated ascites; no mass lesions of the liver identified, though arterial phase not assessed.  Ascites cultures: negative gram stain and aerobic culture.  WBC 8030 > 19410.    Repeat CT: slight improvement in perihepatic fluid; mild increased free abdominopelvic fluid  8/8: d/c'd metronidazole and started zosyn  8/9: drain exchange per IR, repeat cx's    PLAN:  IR consulted, appreciate recs  - monitor drain output  - Flush drain: today, day 2 of 3 of minimal fluid output.   - follow up cultures  - Will plan to repeat CT in about 2 weeks to reassess collection    ID consulted, appreciate recs   - IV piperacillin/tazobactam 13.5 grams IV q8 hours or 12 grams IV via continuous infusion daily, plan for four weeks of therapy for now, tentative EOT 09/07/2023        - If discharged to [facility], recommend ID consult on admission. If discharged to outside LTAC, will need ID consult if available

## 2023-08-15 NOTE — ASSESSMENT & PLAN NOTE
Patient with new hiccups for one week while at OSH. possibly 2/2 from loculated ascites causing irritation to phrenic nerve     Improved with Thorazine. Significantly better now.    PLAN:  -RESOLVED

## 2023-08-15 NOTE — NURSING
"Hospital med 3 Dr zapien notified patient has not taking lactulose since 8/12 and nystatin as well . Dr zapien stated, " ok ! Thank you for letting me know .His level look good ."   "

## 2023-08-16 PROBLEM — E83.51 HYPOCALCEMIA: Status: RESOLVED | Noted: 2023-08-03 | Resolved: 2023-08-16

## 2023-08-16 PROBLEM — E87.6 HYPOKALEMIA: Status: RESOLVED | Noted: 2023-08-03 | Resolved: 2023-08-16

## 2023-08-16 PROBLEM — R06.6 HICCUPS: Status: RESOLVED | Noted: 2023-08-03 | Resolved: 2023-08-16

## 2023-08-16 LAB
ALBUMIN SERPL BCP-MCNC: 2.3 G/DL (ref 3.5–5.2)
ALP SERPL-CCNC: 84 U/L (ref 55–135)
ALT SERPL W/O P-5'-P-CCNC: 13 U/L (ref 10–44)
ANION GAP SERPL CALC-SCNC: 8 MMOL/L (ref 8–16)
AST SERPL-CCNC: 26 U/L (ref 10–40)
BACTERIA SPEC ANAEROBE CULT: NORMAL
BASOPHILS # BLD AUTO: 0.07 K/UL (ref 0–0.2)
BASOPHILS NFR BLD: 0.9 % (ref 0–1.9)
BILIRUB SERPL-MCNC: 1 MG/DL (ref 0.1–1)
BUN SERPL-MCNC: 8 MG/DL (ref 6–20)
CALCIUM SERPL-MCNC: 8.3 MG/DL (ref 8.7–10.5)
CHLORIDE SERPL-SCNC: 109 MMOL/L (ref 95–110)
CO2 SERPL-SCNC: 22 MMOL/L (ref 23–29)
CREAT SERPL-MCNC: 0.7 MG/DL (ref 0.5–1.4)
DIFFERENTIAL METHOD: ABNORMAL
EOSINOPHIL # BLD AUTO: 0.3 K/UL (ref 0–0.5)
EOSINOPHIL NFR BLD: 3.7 % (ref 0–8)
ERYTHROCYTE [DISTWIDTH] IN BLOOD BY AUTOMATED COUNT: 15.2 % (ref 11.5–14.5)
EST. GFR  (NO RACE VARIABLE): >60 ML/MIN/1.73 M^2
GLUCOSE SERPL-MCNC: 80 MG/DL (ref 70–110)
HCT VFR BLD AUTO: 36.9 % (ref 40–54)
HGB BLD-MCNC: 11.9 G/DL (ref 14–18)
IMM GRANULOCYTES # BLD AUTO: 0.02 K/UL (ref 0–0.04)
IMM GRANULOCYTES NFR BLD AUTO: 0.2 % (ref 0–0.5)
INR PPP: 1.2 (ref 0.8–1.2)
LYMPHOCYTES # BLD AUTO: 1.5 K/UL (ref 1–4.8)
LYMPHOCYTES NFR BLD: 18 % (ref 18–48)
MAGNESIUM SERPL-MCNC: 1.8 MG/DL (ref 1.6–2.6)
MCH RBC QN AUTO: 29.2 PG (ref 27–31)
MCHC RBC AUTO-ENTMCNC: 32.2 G/DL (ref 32–36)
MCV RBC AUTO: 90 FL (ref 82–98)
MONOCYTES # BLD AUTO: 1 K/UL (ref 0.3–1)
MONOCYTES NFR BLD: 12.2 % (ref 4–15)
NEUTROPHILS # BLD AUTO: 5.3 K/UL (ref 1.8–7.7)
NEUTROPHILS NFR BLD: 65 % (ref 38–73)
NRBC BLD-RTO: 0 /100 WBC
PHOSPHATE SERPL-MCNC: 3.6 MG/DL (ref 2.7–4.5)
PLATELET # BLD AUTO: 133 K/UL (ref 150–450)
PMV BLD AUTO: 10.8 FL (ref 9.2–12.9)
POTASSIUM SERPL-SCNC: 3.6 MMOL/L (ref 3.5–5.1)
PROT SERPL-MCNC: 6 G/DL (ref 6–8.4)
PROTHROMBIN TIME: 12.6 SEC (ref 9–12.5)
RBC # BLD AUTO: 4.08 M/UL (ref 4.6–6.2)
SODIUM SERPL-SCNC: 139 MMOL/L (ref 136–145)
WBC # BLD AUTO: 8.13 K/UL (ref 3.9–12.7)

## 2023-08-16 PROCEDURE — 83735 ASSAY OF MAGNESIUM: CPT

## 2023-08-16 PROCEDURE — 99232 SBSQ HOSP IP/OBS MODERATE 35: CPT | Mod: ,,, | Performed by: STUDENT IN AN ORGANIZED HEALTH CARE EDUCATION/TRAINING PROGRAM

## 2023-08-16 PROCEDURE — 85025 COMPLETE CBC W/AUTO DIFF WBC: CPT

## 2023-08-16 PROCEDURE — 25000003 PHARM REV CODE 250

## 2023-08-16 PROCEDURE — 85610 PROTHROMBIN TIME: CPT | Performed by: STUDENT IN AN ORGANIZED HEALTH CARE EDUCATION/TRAINING PROGRAM

## 2023-08-16 PROCEDURE — 99232 PR SUBSEQUENT HOSPITAL CARE,LEVL II: ICD-10-PCS | Mod: ,,, | Performed by: STUDENT IN AN ORGANIZED HEALTH CARE EDUCATION/TRAINING PROGRAM

## 2023-08-16 PROCEDURE — 25000003 PHARM REV CODE 250: Performed by: STUDENT IN AN ORGANIZED HEALTH CARE EDUCATION/TRAINING PROGRAM

## 2023-08-16 PROCEDURE — 80053 COMPREHEN METABOLIC PANEL: CPT

## 2023-08-16 PROCEDURE — 11000001 HC ACUTE MED/SURG PRIVATE ROOM

## 2023-08-16 PROCEDURE — C1751 CATH, INF, PER/CENT/MIDLINE: HCPCS

## 2023-08-16 PROCEDURE — 84100 ASSAY OF PHOSPHORUS: CPT

## 2023-08-16 PROCEDURE — 63600175 PHARM REV CODE 636 W HCPCS: Performed by: STUDENT IN AN ORGANIZED HEALTH CARE EDUCATION/TRAINING PROGRAM

## 2023-08-16 PROCEDURE — A4216 STERILE WATER/SALINE, 10 ML: HCPCS | Performed by: STUDENT IN AN ORGANIZED HEALTH CARE EDUCATION/TRAINING PROGRAM

## 2023-08-16 RX ORDER — MUPIROCIN 20 MG/G
OINTMENT TOPICAL
Status: COMPLETED
Start: 2023-08-16 | End: 2023-08-16

## 2023-08-16 RX ORDER — TALC
3 POWDER (GRAM) TOPICAL NIGHTLY PRN
Status: DISCONTINUED | OUTPATIENT
Start: 2023-08-16 | End: 2023-08-17 | Stop reason: HOSPADM

## 2023-08-16 RX ORDER — MUPIROCIN 20 MG/G
OINTMENT TOPICAL 2 TIMES DAILY
Status: DISCONTINUED | OUTPATIENT
Start: 2023-08-17 | End: 2023-08-17 | Stop reason: HOSPADM

## 2023-08-16 RX ORDER — TALC
POWDER (GRAM) TOPICAL
Status: COMPLETED
Start: 2023-08-16 | End: 2023-08-16

## 2023-08-16 RX ADMIN — BUSPIRONE HYDROCHLORIDE 7.5 MG: 5 TABLET ORAL at 08:08

## 2023-08-16 RX ADMIN — PANTOPRAZOLE SODIUM 40 MG: 40 TABLET, DELAYED RELEASE ORAL at 08:08

## 2023-08-16 RX ADMIN — SERTRALINE HYDROCHLORIDE 25 MG: 25 TABLET ORAL at 08:08

## 2023-08-16 RX ADMIN — PIPERACILLIN SODIUM AND TAZOBACTAM SODIUM 4.5 G: 4; .5 INJECTION, POWDER, FOR SOLUTION INTRAVENOUS at 05:08

## 2023-08-16 RX ADMIN — ENOXAPARIN SODIUM 40 MG: 40 INJECTION SUBCUTANEOUS at 04:08

## 2023-08-16 RX ADMIN — Medication 10 ML: at 08:08

## 2023-08-16 RX ADMIN — LEVOTHYROXINE SODIUM 75 MCG: 75 TABLET ORAL at 05:08

## 2023-08-16 RX ADMIN — MUPIROCIN: 20 OINTMENT TOPICAL at 11:08

## 2023-08-16 RX ADMIN — PROPRANOLOL HYDROCHLORIDE 10 MG: 10 TABLET ORAL at 09:08

## 2023-08-16 RX ADMIN — PROPRANOLOL HYDROCHLORIDE 10 MG: 10 TABLET ORAL at 08:08

## 2023-08-16 RX ADMIN — Medication 3 MG: at 11:08

## 2023-08-16 RX ADMIN — PIPERACILLIN SODIUM AND TAZOBACTAM SODIUM 4.5 G: 4; .5 INJECTION, POWDER, FOR SOLUTION INTRAVENOUS at 09:08

## 2023-08-16 RX ADMIN — PIPERACILLIN SODIUM AND TAZOBACTAM SODIUM 4.5 G: 4; .5 INJECTION, POWDER, FOR SOLUTION INTRAVENOUS at 12:08

## 2023-08-16 RX ADMIN — Medication 10 ML: at 09:08

## 2023-08-16 RX ADMIN — BUSPIRONE HYDROCHLORIDE 7.5 MG: 5 TABLET ORAL at 09:08

## 2023-08-16 NOTE — PLAN OF CARE
Patient resting in bed with eyes closed. No distress or discomfort noted. Patient awaken to environmental stimuli. Vital signs obtained and stable. PM assessment completed. No concerns or complaints at this time. Patient denies any needs. POC ongoing.   Problem: Adult Inpatient Plan of Care  Goal: Plan of Care Review  Outcome: Ongoing, Progressing  Goal: Patient-Specific Goal (Individualized)  Outcome: Ongoing, Progressing  Goal: Absence of Hospital-Acquired Illness or Injury  Outcome: Ongoing, Progressing  Goal: Optimal Comfort and Wellbeing  Outcome: Ongoing, Progressing  Goal: Readiness for Transition of Care  Outcome: Ongoing, Progressing     Problem: Bleeding (Surgery Nonspecified)  Goal: Absence of Bleeding  Outcome: Ongoing, Progressing     Problem: Bowel Motility Impaired (Surgery Nonspecified)  Goal: Effective Bowel Elimination  Outcome: Ongoing, Progressing     Problem: Fluid and Electrolyte Imbalance (Surgery Nonspecified)  Goal: Fluid and Electrolyte Balance  Outcome: Ongoing, Progressing     Problem: Glycemic Control Impaired (Surgery Nonspecified)  Goal: Blood Glucose Level Within Targeted Range  Outcome: Ongoing, Progressing     Problem: Infection (Surgery Nonspecified)  Goal: Absence of Infection Signs and Symptoms  Outcome: Ongoing, Progressing     Problem: Ongoing Anesthesia Effects (Surgery Nonspecified)  Goal: Anesthesia/Sedation Recovery  Outcome: Ongoing, Progressing     Problem: Pain (Surgery Nonspecified)  Goal: Acceptable Pain Control  Outcome: Ongoing, Progressing     Problem: Postoperative Nausea and Vomiting (Surgery Nonspecified)  Goal: Nausea and Vomiting Relief  Outcome: Ongoing, Progressing     Problem: Postoperative Urinary Retention (Surgery Nonspecified)  Goal: Effective Urinary Elimination  Outcome: Ongoing, Progressing     Problem: Respiratory Compromise (Surgery Nonspecified)  Goal: Effective Oxygenation and Ventilation  Outcome: Ongoing, Progressing     Problem:  Infection  Goal: Absence of Infection Signs and Symptoms  Outcome: Ongoing, Progressing

## 2023-08-16 NOTE — ASSESSMENT & PLAN NOTE
Cirrhotic liver morphology on imaging, unclear etiology though he does have a history of HCV  MELD 3.0: 10 at 8/16/2023  4:05 AM  MELD-Na: 8 at 8/16/2023  4:05 AM  Calculated from:  Serum Creatinine: 0.7 mg/dL (Using min of 1 mg/dL) at 8/16/2023  4:05 AM  Serum Sodium: 139 mmol/L (Using max of 137 mmol/L) at 8/16/2023  4:05 AM  Total Bilirubin: 1.0 mg/dL at 8/16/2023  4:05 AM  Serum Albumin: 2.3 g/dL at 8/16/2023  4:05 AM  INR(ratio): 1.2 at 8/16/2023  4:05 AM  Age at listing (hypothetical): 53 years  Sex: Male at 8/16/2023  4:05 AM    Ammonia 67, concern for hepatic encephalopathy 2/2 report of one episode of hallucinations per wife. No subsequent episodes. Patient improved with lactulose, and refusing further doses.    PLAN:  - Referred to outpatient hepatologist  - patient on nadolol at OSH, started on propranolol this admission  - continue pantoprazole 40 mg from OSH   - Delirium resolved

## 2023-08-16 NOTE — PROGRESS NOTES
[facility]  Progress Note    Patient Name: Gregg Page  MRN: [id number]  Patient Class: IP- Inpatient   Admission Date: 8/3/2023  Length of Stay: 13 days  Attending Physician: Oliverio Camacho, *  Primary Care Provider: No, Primary Doctor        Subjective:     Principal Problem:Intra-abdominal abscess        HPI:  Mr. Page is a 53 YOM with cirrhosis 2/2 to EtOH use and HCV, esophageal varices, GIB, HTN, hypothyroidism, MDD, hydrocephalous, cholelithiasis, who presented to OSH for abdominal pain and is coming to [facility] for IR evaluation of intra-thoracic hematoma and hepatology evaluation. Of note, patient was hospitalized 6/23 with similar presentation had MRCP done at that time (unavailable for review).  Abdominal pain radiates to the chest, onset 6 days prior to presentation at OSH. Patient denies melena, BRBPR, continued abdominal pain or CP. Patient states morning of transfer he started having tongue swelling and pain, feels dry, remitted with water, has dysphagia when his tongue is dry only, pain described as burning and located throughout tongue. No recent procedures/liver biopsy done. Patient also states for the past 1 week has had intractable hiccups which cause anxiety.     Patient hemodynamically stable. Labs concerning for new leukocytosis 17.9, normocytic anemia 12.6 ammonia 50, low Hct 32.2, elevated PT 16.6, INR 1.31, Troponin, lipase, U/A, BNP, CXR wnl    CT A/P with IV contrast done and shows upper abdominal fluid collection likely a hematoma, surrounds the liver, stomach, and spleen. Origin is not identified however may represent a variceal or hepatic hematoma. No active extravasation identified. Collection measures greater than 21.7 cm in transverse diameter and largest single pocket measures approx 7.5cm in maximal AP dimension.      Overview/Hospital Course:  Procal 0.59; blood cultures show NGTD. CT triple phase shows loculated ascites and no visualized liver  lesion. IR drainage of presumed intra-abdominal abscess 8/4/2023; pending cxs. Cytology reveals elevated WBC at 8030; aerobic cx negative, gram stain negative. ID consulted and recommended repeating cell count with continued abx. Repeat abdominal fluid cytology shows WBC increased to 11,000. CT obtained, pending results and IR recs. Repeat CT on 8/8 shows slight improvement in perihepatic fluid and mild increased free abdominopelvic fluid; d/c'd metronidazole and started zosyn. IR drain exchange took place 8/9, repeated cx's obtained. MRI 8/9 shows peripherally enhancing loculated fluid collection surrounding the left hepatic lobe with drainage catheter in place with no obvious connection to the biliary system. Differential includes abscess versus loculated ascites. WBC improved with pip/tazo. Awaiting LTAC placement. Today, first day of minimal output from drains. Drain can be removed on output <20mL for 3 days in a row (on 8/15, put out 22mL). Pt is going to [facility] pending insurance approval. Pt admitted at [facility], pending insurance approval.       Interval History: NAOE, VSS, patient resting comfortably. Drain in place with minimal output. Put out a total of 22mL yesterday. Will restart the clock and wait for 3 more days of <20mL output before removing drain.    LTAC auth still pending.    Review of Systems   Constitutional: Negative.    HENT: Negative.     Respiratory: Negative.     Cardiovascular: Negative.    Gastrointestinal:  Positive for abdominal distention. Negative for abdominal pain.   Genitourinary: Negative.    Musculoskeletal: Negative.    Skin:  Positive for rash.   Neurological: Negative.    Psychiatric/Behavioral: Negative.       Objective:     Vital Signs (Most Recent):  Temp: 96.3 °F (35.7 °C) (08/16/23 1139)  Pulse: 65 (08/16/23 1139)  Resp: 18 (08/16/23 1139)  BP: 129/65 (08/16/23 1139)  SpO2: (!) 93 % (08/16/23 1139) Vital Signs (24h Range):  Temp:  [96.3 °F (35.7  °C)-98.4 °F (36.9 °C)] 96.3 °F (35.7 °C)  Pulse:  [65-73] 65  Resp:  [18] 18  SpO2:  [93 %-98 %] 93 %  BP: (114-129)/(60-74) 129/65     Weight: 92.3 kg (203 lb 7.8 oz)  Body mass index is 28.38 kg/m².    Intake/Output Summary (Last 24 hours) at 8/16/2023 1153  Last data filed at 8/16/2023 0548  Gross per 24 hour   Intake 650 ml   Output 22.5 ml   Net 627.5 ml           Physical Exam  Constitutional:       Appearance: Normal appearance.   HENT:      Head: Normocephalic and atraumatic.      Nose: Nose normal.      Mouth/Throat:      Mouth: Mucous membranes are moist.   Cardiovascular:      Rate and Rhythm: Normal rate and regular rhythm.      Pulses: Normal pulses.      Heart sounds: Normal heart sounds.   Pulmonary:      Effort: Pulmonary effort is normal.      Breath sounds: Normal breath sounds.   Abdominal:      General: Bowel sounds are normal.      Tenderness: There is no abdominal tenderness.   Musculoskeletal:         General: Normal range of motion.      Cervical back: Normal range of motion.   Skin:     Findings: Rash present.   Neurological:      General: No focal deficit present.      Mental Status: He is alert.   Psychiatric:         Mood and Affect: Mood normal.         Behavior: Behavior normal.         Thought Content: Thought content normal.         Judgment: Judgment normal.             Significant Labs: All pertinent labs within the past 24 hours have been reviewed.    Significant Imaging: I have reviewed all pertinent imaging results/findings within the past 24 hours.      Assessment/Plan:      * Intra-abdominal abscess  Mr. Page is a 53 YOM with cirrhosis 2/2 to EtOH use and HCV, esophageal varices, GIB, HTN, hypothyroidism, MDD, hydrocephalous, cholelithiasis, who presented to OSH for abdominal pain and is coming to [facility] for IR evaluation of intra-thoracic hematoma and hepatology evaluation. Presented for abdominal pain which has now resolved. CT A/P with IV contrast done and shows upper  abdominal fluid collection likely a hematoma, surrounds the liver, stomach, and spleen. Origin is not identified however may represent a variceal or hepatic hematoma. No active extravasation identified. Patient transferred for IR and hepatology evaluation. History of past varices ligation. Has 1.1 cm hypoattenuating lesion in left hepatic lobe.     CT triple phase: loculated ascites; no mass lesions of the liver identified, though arterial phase not assessed.  Ascites cultures: negative gram stain and aerobic culture.  WBC 8030 > 15379.    Repeat CT: slight improvement in perihepatic fluid; mild increased free abdominopelvic fluid  8/8: d/c'd metronidazole and started zosyn  8/9: drain exchange per IR, repeat cx's    PLAN:  IR consulted, appreciate recs  - monitor drain output  - Flush drain: today, day 2 of 3 of minimal fluid output.   - follow up cultures  - Will plan to repeat CT in about 2 weeks to reassess collection    ID consulted, appreciate recs   - IV piperacillin/tazobactam 13.5 grams IV q8 hours or 12 grams IV via continuous infusion daily, plan for four weeks of therapy for now, tentative EOT 09/07/2023        - If discharged to [facility], recommend ID consult on admission. If discharged to outside LTAC, will need ID consult if available    LUISA (iron deficiency anemia)  Transfuse hgb greater than 7      Leukocytosis  RESOLVED    Patient with leukocytosis that was present at OSH at 17.9 and downtrending to 15.24. patient otherwise is hemodynamically stable. Low concern for sepsis at this time however high chance for decompensation given presence of hematoma    Unclear etiology consider infected hematoma vs stress    - continue to monitor     HTN (hypertension)  Patient normotensive with hematoma present. Given risk for possible hemorrhagic shock will hold home BP meds     - hold home amlodipine 5   - hold home HCTZ 25     Gallstones  Cholelithiasis without cystitis   Seen on imaging 8/2/23    -  monitor ALP and bili    HCV (hepatitis C virus)  Per OSH records patient with history HCV    MDD (major depressive disorder)  - continue home sertraline 50 mg   - continue buspirone 7.5 mg BID    Cirrhosis  Cirrhotic liver morphology on imaging, unclear etiology though he does have a history of HCV  MELD 3.0: 10 at 8/16/2023  4:05 AM  MELD-Na: 8 at 8/16/2023  4:05 AM  Calculated from:  Serum Creatinine: 0.7 mg/dL (Using min of 1 mg/dL) at 8/16/2023  4:05 AM  Serum Sodium: 139 mmol/L (Using max of 137 mmol/L) at 8/16/2023  4:05 AM  Total Bilirubin: 1.0 mg/dL at 8/16/2023  4:05 AM  Serum Albumin: 2.3 g/dL at 8/16/2023  4:05 AM  INR(ratio): 1.2 at 8/16/2023  4:05 AM  Age at listing (hypothetical): 53 years  Sex: Male at 8/16/2023  4:05 AM    Ammonia 67, concern for hepatic encephalopathy 2/2 report of one episode of hallucinations per wife. No subsequent episodes. Patient improved with lactulose, and refusing further doses.    PLAN:  - Referred to outpatient hepatologist  - patient on nadolol at OSH, started on propranolol this admission  - continue pantoprazole 40 mg from OSH   - Delirium resolved     Hypothyroidism  - continue home synthroid 75 mcg    Normocytic anemia  Patient presents with anemia 12.1 mcv 87 (normocytic anemia 12.6 at OSH). No known baseline hemoglobin. Patient with new hematoma formation.     Iron panel: iron 26, TIBC 250, transferrin 169, Ferritin 213.     8/8: thrombocytes 94, lovenox held  8/9: plts 108  8/14: Hgb 12.6, plts: 157  8/15: Hgb 12.3, plts: 148      VTE Risk Mitigation (From admission, onward)         Ordered     enoxaparin injection 40 mg  Daily         08/07/23 1005     IP VTE LOW RISK PATIENT  Once         08/07/23 1005     Place sequential compression device  Until discontinued         08/03/23 0108                Discharge Planning   DONIS: 8/16/2023     Code Status: Full Code   Is the patient medically ready for discharge?: Yes    Reason for patient still in hospital (select all  that apply): Pending disposition  Discharge Plan A: Long-term acute care facility (LTAC)                  Jojo Pearson MD  Department of [facility] Medicine   [facility] - Surgery

## 2023-08-16 NOTE — SUBJECTIVE & OBJECTIVE
Interval History: NAOE, VSS, patient resting comfortably. Drain in place with minimal output. Put out a total of 22mL yesterday. Will restart the clock and wait for 3 more days of <20mL output before removing drain.    LTAC auth still pending.    Review of Systems   Constitutional: Negative.    HENT: Negative.     Respiratory: Negative.     Cardiovascular: Negative.    Gastrointestinal:  Positive for abdominal distention. Negative for abdominal pain.   Genitourinary: Negative.    Musculoskeletal: Negative.    Skin:  Positive for rash.   Neurological: Negative.    Psychiatric/Behavioral: Negative.       Objective:     Vital Signs (Most Recent):  Temp: 96.3 °F (35.7 °C) (08/16/23 1139)  Pulse: 65 (08/16/23 1139)  Resp: 18 (08/16/23 1139)  BP: 129/65 (08/16/23 1139)  SpO2: (!) 93 % (08/16/23 1139) Vital Signs (24h Range):  Temp:  [96.3 °F (35.7 °C)-98.4 °F (36.9 °C)] 96.3 °F (35.7 °C)  Pulse:  [65-73] 65  Resp:  [18] 18  SpO2:  [93 %-98 %] 93 %  BP: (114-129)/(60-74) 129/65     Weight: 92.3 kg (203 lb 7.8 oz)  Body mass index is 28.38 kg/m².    Intake/Output Summary (Last 24 hours) at 8/16/2023 1153  Last data filed at 8/16/2023 0548  Gross per 24 hour   Intake 650 ml   Output 22.5 ml   Net 627.5 ml           Physical Exam  Constitutional:       Appearance: Normal appearance.   HENT:      Head: Normocephalic and atraumatic.      Nose: Nose normal.      Mouth/Throat:      Mouth: Mucous membranes are moist.   Cardiovascular:      Rate and Rhythm: Normal rate and regular rhythm.      Pulses: Normal pulses.      Heart sounds: Normal heart sounds.   Pulmonary:      Effort: Pulmonary effort is normal.      Breath sounds: Normal breath sounds.   Abdominal:      General: Bowel sounds are normal.      Tenderness: There is no abdominal tenderness.   Musculoskeletal:         General: Normal range of motion.      Cervical back: Normal range of motion.   Skin:     Findings: Rash present.   Neurological:      General: No focal  deficit present.      Mental Status: He is alert.   Psychiatric:         Mood and Affect: Mood normal.         Behavior: Behavior normal.         Thought Content: Thought content normal.         Judgment: Judgment normal.             Significant Labs: All pertinent labs within the past 24 hours have been reviewed.    Significant Imaging: I have reviewed all pertinent imaging results/findings within the past 24 hours.

## 2023-08-17 VITALS
WEIGHT: 203.5 LBS | HEIGHT: 71 IN | SYSTOLIC BLOOD PRESSURE: 110 MMHG | HEART RATE: 68 BPM | DIASTOLIC BLOOD PRESSURE: 66 MMHG | TEMPERATURE: 98 F | BODY MASS INDEX: 28.49 KG/M2 | RESPIRATION RATE: 18 BRPM | OXYGEN SATURATION: 94 %

## 2023-08-17 LAB
ALBUMIN SERPL BCP-MCNC: 2.3 G/DL (ref 3.5–5.2)
ALP SERPL-CCNC: 87 U/L (ref 55–135)
ALT SERPL W/O P-5'-P-CCNC: 16 U/L (ref 10–44)
ANION GAP SERPL CALC-SCNC: 6 MMOL/L (ref 8–16)
ANISOCYTOSIS BLD QL SMEAR: SLIGHT
AST SERPL-CCNC: 29 U/L (ref 10–40)
BASOPHILS # BLD AUTO: ABNORMAL K/UL (ref 0–0.2)
BASOPHILS NFR BLD: 0 % (ref 0–1.9)
BILIRUB SERPL-MCNC: 0.9 MG/DL (ref 0.1–1)
BUN SERPL-MCNC: 9 MG/DL (ref 6–20)
BURR CELLS BLD QL SMEAR: ABNORMAL
CALCIUM SERPL-MCNC: 8.1 MG/DL (ref 8.7–10.5)
CHLORIDE SERPL-SCNC: 112 MMOL/L (ref 95–110)
CO2 SERPL-SCNC: 22 MMOL/L (ref 23–29)
CREAT SERPL-MCNC: 0.6 MG/DL (ref 0.5–1.4)
DACRYOCYTES BLD QL SMEAR: ABNORMAL
DIFFERENTIAL METHOD: ABNORMAL
EOSINOPHIL # BLD AUTO: ABNORMAL K/UL (ref 0–0.5)
EOSINOPHIL NFR BLD: 3 % (ref 0–8)
ERYTHROCYTE [DISTWIDTH] IN BLOOD BY AUTOMATED COUNT: 15.5 % (ref 11.5–14.5)
EST. GFR  (NO RACE VARIABLE): >60 ML/MIN/1.73 M^2
GLUCOSE SERPL-MCNC: 83 MG/DL (ref 70–110)
HCT VFR BLD AUTO: 36.6 % (ref 40–54)
HGB BLD-MCNC: 12.3 G/DL (ref 14–18)
HYPOCHROMIA BLD QL SMEAR: ABNORMAL
IMM GRANULOCYTES # BLD AUTO: ABNORMAL K/UL (ref 0–0.04)
IMM GRANULOCYTES NFR BLD AUTO: ABNORMAL % (ref 0–0.5)
INR PPP: 1.2 (ref 0.8–1.2)
LYMPHOCYTES # BLD AUTO: ABNORMAL K/UL (ref 1–4.8)
LYMPHOCYTES NFR BLD: 12 % (ref 18–48)
MAGNESIUM SERPL-MCNC: 1.7 MG/DL (ref 1.6–2.6)
MCH RBC QN AUTO: 29.6 PG (ref 27–31)
MCHC RBC AUTO-ENTMCNC: 33.6 G/DL (ref 32–36)
MCV RBC AUTO: 88 FL (ref 82–98)
MONOCYTES # BLD AUTO: ABNORMAL K/UL (ref 0.3–1)
MONOCYTES NFR BLD: 5 % (ref 4–15)
NEUTROPHILS # BLD AUTO: ABNORMAL K/UL (ref 1.8–7.7)
NEUTROPHILS NFR BLD: 80 % (ref 38–73)
NRBC BLD-RTO: 0 /100 WBC
OVALOCYTES BLD QL SMEAR: ABNORMAL
PHOSPHATE SERPL-MCNC: 3.5 MG/DL (ref 2.7–4.5)
PLATELET # BLD AUTO: 114 K/UL (ref 150–450)
PMV BLD AUTO: ABNORMAL FL (ref 9.2–12.9)
POIKILOCYTOSIS BLD QL SMEAR: SLIGHT
POLYCHROMASIA BLD QL SMEAR: ABNORMAL
POTASSIUM SERPL-SCNC: 3.7 MMOL/L (ref 3.5–5.1)
PROT SERPL-MCNC: 6 G/DL (ref 6–8.4)
PROTHROMBIN TIME: 12.4 SEC (ref 9–12.5)
RBC # BLD AUTO: 4.15 M/UL (ref 4.6–6.2)
SODIUM SERPL-SCNC: 140 MMOL/L (ref 136–145)
WBC # BLD AUTO: 6.42 K/UL (ref 3.9–12.7)

## 2023-08-17 PROCEDURE — 80053 COMPREHEN METABOLIC PANEL: CPT

## 2023-08-17 PROCEDURE — 85610 PROTHROMBIN TIME: CPT | Performed by: STUDENT IN AN ORGANIZED HEALTH CARE EDUCATION/TRAINING PROGRAM

## 2023-08-17 PROCEDURE — 85027 COMPLETE CBC AUTOMATED: CPT

## 2023-08-17 PROCEDURE — 83735 ASSAY OF MAGNESIUM: CPT

## 2023-08-17 PROCEDURE — 84100 ASSAY OF PHOSPHORUS: CPT

## 2023-08-17 PROCEDURE — 63600175 PHARM REV CODE 636 W HCPCS: Performed by: STUDENT IN AN ORGANIZED HEALTH CARE EDUCATION/TRAINING PROGRAM

## 2023-08-17 PROCEDURE — 99239 PR HOSPITAL DISCHARGE DAY,>30 MIN: ICD-10-PCS | Mod: ,,, | Performed by: HOSPITALIST

## 2023-08-17 PROCEDURE — 25000003 PHARM REV CODE 250: Performed by: STUDENT IN AN ORGANIZED HEALTH CARE EDUCATION/TRAINING PROGRAM

## 2023-08-17 PROCEDURE — 25000003 PHARM REV CODE 250

## 2023-08-17 PROCEDURE — 85007 BL SMEAR W/DIFF WBC COUNT: CPT

## 2023-08-17 PROCEDURE — 99239 HOSP IP/OBS DSCHRG MGMT >30: CPT | Mod: ,,, | Performed by: HOSPITALIST

## 2023-08-17 PROCEDURE — A4216 STERILE WATER/SALINE, 10 ML: HCPCS | Performed by: STUDENT IN AN ORGANIZED HEALTH CARE EDUCATION/TRAINING PROGRAM

## 2023-08-17 RX ADMIN — SERTRALINE HYDROCHLORIDE 25 MG: 25 TABLET ORAL at 08:08

## 2023-08-17 RX ADMIN — PANTOPRAZOLE SODIUM 40 MG: 40 TABLET, DELAYED RELEASE ORAL at 08:08

## 2023-08-17 RX ADMIN — Medication 10 ML: at 09:08

## 2023-08-17 RX ADMIN — BUSPIRONE HYDROCHLORIDE 7.5 MG: 5 TABLET ORAL at 08:08

## 2023-08-17 RX ADMIN — PROPRANOLOL HYDROCHLORIDE 10 MG: 10 TABLET ORAL at 08:08

## 2023-08-17 RX ADMIN — LEVOTHYROXINE SODIUM 75 MCG: 75 TABLET ORAL at 06:08

## 2023-08-17 RX ADMIN — PIPERACILLIN SODIUM AND TAZOBACTAM SODIUM 4.5 G: 4; .5 INJECTION, POWDER, FOR SOLUTION INTRAVENOUS at 06:08

## 2023-08-17 NOTE — ASSESSMENT & PLAN NOTE
Patient presents with anemia 12.1 mcv 87 (normocytic anemia 12.6 at OSH). No known baseline hemoglobin. Patient with new hematoma formation.     Iron panel: iron 26, TIBC 250, transferrin 169, Ferritin 213.     8/8: thrombocytes 94, lovenox held  8/9: plts 108  8/14: Hgb 12.6, plts: 157  8/15: Hgb 12.3, plts: 148  8/16: Hgb 11.4, plts: 114

## 2023-08-17 NOTE — PLAN OF CARE
08/17/23 1034   Post-Acute Status   Post-Acute Authorization Placement   Post-Acute Placement Status Set-up Complete/Auth obtained     Pt's transport has been accepted.JANEL scheduled d/c transportation [facility]. Patient is scheduled to be picked up at 11:30 am. JANEL provided patient's nurse with report number #[phone]; ask for the nurse for the patient. Requested  time does not guarantee arrival time.      JANEL informed the pt and pt's family at bedside.    Faye Weaver LMSW  Case Management   [facility]   Ext. [phone]

## 2023-08-17 NOTE — DISCHARGE SUMMARY
[facility] Medicine  Discharge Summary      Patient Name: Gregg Page  MRN: [id number]  [facility]: [id number]  Patient Class: IP- Inpatient  Admission Date: 8/3/2023  Hospital Length of Stay: 14 days  Discharge Date and Time:  08/17/2023 10:09 AM  Attending Physician: Rosalind Borden MD   Discharging Provider: Cristian Wayne MD  Primary Care Provider: Katya Primary Doctor  [facility] Medicine Team: [facility] HOSP MED 3 Cristian Wayne MD  Primary Care Team: [facility] HOSP MED 3    HPI:   Mr. Page is a 53 YOM with cirrhosis 2/2 to EtOH use and HCV, esophageal varices, GIB, HTN, hypothyroidism, MDD, hydrocephalous, cholelithiasis, who presented to OSH for abdominal pain and is coming to [facility] for IR evaluation of intra-thoracic hematoma and hepatology evaluation. Of note, patient was hospitalized 6/23 with similar presentation had MRCP done at that time (unavailable for review).  Abdominal pain radiates to the chest, onset 6 days prior to presentation at OSH. Patient denies melena, BRBPR, continued abdominal pain or CP. Patient states morning of transfer he started having tongue swelling and pain, feels dry, remitted with water, has dysphagia when his tongue is dry only, pain described as burning and located throughout tongue. No recent procedures/liver biopsy done. Patient also states for the past 1 week has had intractable hiccups which cause anxiety.     Patient hemodynamically stable. Labs concerning for new leukocytosis 17.9, normocytic anemia 12.6 ammonia 50, low Hct 32.2, elevated PT 16.6, INR 1.31, Troponin, lipase, U/A, BNP, CXR wnl    CT A/P with IV contrast done and shows upper abdominal fluid collection likely a hematoma, surrounds the liver, stomach, and spleen. Origin is not identified however may represent a variceal or hepatic hematoma. No active extravasation identified. Collection measures greater than 21.7 cm in transverse diameter and largest single pocket measures approx  7.5cm in maximal AP dimension.      * No surgery found *      Hospital Course:   Procal 0.59; blood cultures show NGTD. CT triple phase shows loculated ascites and no visualized liver lesion. IR drainage of presumed intra-abdominal abscess 8/4/2023; pending cxs. Cytology reveals elevated WBC at 8030; aerobic cx negative, gram stain negative. ID consulted and recommended repeating cell count with continued abx. Repeat abdominal fluid cytology shows WBC increased to 11,000. CT obtained, pending results and IR recs. Repeat CT on 8/8 shows slight improvement in perihepatic fluid and mild increased free abdominopelvic fluid; d/c'd metronidazole and started zosyn. IR drain exchange took place 8/9, repeated cx's obtained. MRI 8/9 shows peripherally enhancing loculated fluid collection surrounding the left hepatic lobe with drainage catheter in place with no obvious connection to the biliary system. Differential includes abscess versus loculated ascites. WBC improved with pip/tazo. Awaiting LTAC placement. Today, first day of minimal output from drains. Drain can be removed on output <20mL for 3 days in a row (on 8/15, put out 22mL). Pt is going to [facility] pending insurance approval. Pt admitted at [facility], will go there today.      Physical Exam  Constitutional:       Appearance: Normal appearance.   HENT:      Head: Normocephalic and atraumatic.      Nose: Nose normal.      Mouth/Throat:      Mouth: Mucous membranes are moist.   Cardiovascular:      Rate and Rhythm: Normal rate and regular rhythm.      Pulses: Normal pulses.      Heart sounds: Normal heart sounds.   Pulmonary:      Effort: Pulmonary effort is normal.      Breath sounds: Normal breath sounds.   Abdominal:      General: Bowel sounds are normal.      Tenderness: There is no abdominal tenderness.   Musculoskeletal:         General: Normal range of motion.      Cervical back: Normal range of motion.   Skin:     Findings: Rash present.    Neurological:      General: No focal deficit present.      Mental Status: He is alert.   Psychiatric:         Mood and Affect: Mood normal.         Behavior: Behavior normal.         Thought Content: Thought content normal.         Judgment: Judgment normal.         Significant Labs: All pertinent labs within the past 24 hours have been reviewed.     Significant Imaging: I have reviewed all pertinent imaging results/findings within the past 24 hours.     Vitals:    08/17/23 0742   BP: 106/63   Pulse: 65   Resp: 20   Temp: 97.7 °F (36.5 °C)       Goals of Care Treatment Preferences:  Code Status: Full Code      Consults:   Consults (From admission, onward)          Status Ordering Provider     Inpatient consult to Midline team  Once        Provider:  (Not yet assigned)    Completed DAVON TRINIDAD     Inpatient consult to Interventional Radiology  Once        Provider:  (Not yet assigned)    Completed DAVON TRINIDAD     Inpatient consult to Interventional Radiology  Once        Provider:  (Not yet assigned)    Completed DELIO AYERS     Inpatient consult to Infectious Diseases  Once        Provider:  (Not yet assigned)    Completed DELIO AYERS     Inpatient consult to Hepatology  Once        Provider:  (Not yet assigned)    Completed LARRY NICE     Inpatient consult to Interventional Radiology  Once        Provider:  (Not yet assigned)    Completed LARRY NICE            Oncology  Normocytic anemia  Patient presents with anemia 12.1 mcv 87 (normocytic anemia 12.6 at OSH). No known baseline hemoglobin. Patient with new hematoma formation.     Iron panel: iron 26, TIBC 250, transferrin 169, Ferritin 213.     8/8: thrombocytes 94, lovenox held  8/9: plts 108  8/14: Hgb 12.6, plts: 157  8/15: Hgb 12.3, plts: 148  8/16: Hgb 11.4, plts: 114     GI  * Intra-abdominal abscess  Mr. Page is a 53 YOM with cirrhosis 2/2 to EtOH use and HCV, esophageal varices, GIB, HTN, hypothyroidism, MDD, hydrocephalous,  cholelithiasis, who presented to OSH for abdominal pain and is coming to [facility] for IR evaluation of intra-thoracic hematoma and hepatology evaluation. Presented for abdominal pain which has now resolved. CT A/P with IV contrast done and shows upper abdominal fluid collection likely a hematoma, surrounds the liver, stomach, and spleen. Origin is not identified however may represent a variceal or hepatic hematoma. No active extravasation identified. Patient transferred for IR and hepatology evaluation. History of past varices ligation. Has 1.1 cm hypoattenuating lesion in left hepatic lobe.     CT triple phase: loculated ascites; no mass lesions of the liver identified, though arterial phase not assessed.  Ascites cultures: negative gram stain and aerobic culture.  WBC 8030 > 60816.    Repeat CT: slight improvement in perihepatic fluid; mild increased free abdominopelvic fluid  8/8: d/c'd metronidazole and started zosyn  8/9: drain exchange per IR, repeat cx's    PLAN:  IR consulted, appreciate recs  - monitor drain output  - Flush drain: Yesterday had output of 22 cc. Will restart on day 1/3 of minimal output. After 3 days of minimal output, drains can be taken out.   - follow up cultures: NGTD  - Will plan to repeat CT in about 2 weeks to reassess collection    ID consulted, appreciate recs   - IV piperacillin/tazobactam 13.5 grams IV q8 hours or 12 grams IV via continuous infusion daily, plan for four weeks of therapy for now, tentative EOT 09/07/2023        - If discharged to [facility], recommend ID consult on admission. If discharged to outside LTAC, will need ID consult if available    Cirrhosis  Cirrhotic liver morphology on imaging, unclear etiology though he does have a history of HCV  MELD 3.0: 10 at 8/17/2023  5:18 AM  MELD-Na: 8 at 8/17/2023  5:18 AM  Calculated from:  Serum Creatinine: 0.6 mg/dL (Using min of 1 mg/dL) at 8/17/2023  5:18 AM  Serum Sodium: 140 mmol/L (Using max of 137 mmol/L) at 8/17/2023   5:18 AM  Total Bilirubin: 0.9 mg/dL (Using min of 1 mg/dL) at 8/17/2023  5:18 AM  Serum Albumin: 2.3 g/dL at 8/17/2023  5:18 AM  INR(ratio): 1.2 at 8/17/2023  5:18 AM  Age at listing (hypothetical): 53 years  Sex: Male at 8/17/2023  5:18 AM    Ammonia 67, concern for hepatic encephalopathy 2/2 report of one episode of hallucinations per wife. No subsequent episodes. Patient improved with lactulose, and refusing further doses.    PLAN:  - Referred to outpatient hepatologist  - patient on nadolol at OSH, started on propranolol this admission  - continue pantoprazole 40 mg from OSH   - Delirium resolved       Final Active Diagnoses:    Diagnosis Date Noted POA    PRINCIPAL PROBLEM:  Intra-abdominal abscess [K65.1] 08/03/2023 Yes    Normocytic anemia [D64.9] 08/03/2023 Yes    Hypothyroidism [E03.9] 08/03/2023 Yes    Cirrhosis [K74.60] 08/03/2023 Yes    MDD (major depressive disorder) [F32.9] 08/03/2023 Yes    HCV (hepatitis C virus) [B19.20] 08/03/2023 Yes    Gallstones [K80.20] 08/03/2023 Yes    HTN (hypertension) [I10] 08/03/2023 Yes    Leukocytosis [D72.829] 08/03/2023 Yes    LUISA (iron deficiency anemia) [D50.9] 08/03/2023 Yes      Problems Resolved During this Admission:    Diagnosis Date Noted Date Resolved POA    Altered mental state [R41.82] 08/04/2023 08/09/2023 Yes    Hiccups [R06.6] 08/03/2023 08/16/2023 Yes    Tongue lesion [K14.8] 08/03/2023 08/12/2023 Yes    Hypokalemia [E87.6] 08/03/2023 08/16/2023 Yes    Hypocalcemia [E83.51] 08/03/2023 08/16/2023 Yes       Discharged Condition: stable    Disposition: Long Term Acute Care    Follow Up:    Patient Instructions:      Ambulatory referral/consult to Dermatology   Standing Status: Future   Referral Priority: Routine Referral Type: Consultation   Referral Reason: Specialty Services Required   Requested Specialty: Dermatology   Number of Visits Requested: 1     Ambulatory referral/consult to Hematology / Oncology   Standing Status: Future   Referral Priority:  Routine Referral Type: Consultation   Referral Reason: Specialty Services Required   Requested Specialty: Hematology and Oncology   Number of Visits Requested: 1     Ambulatory referral/consult to Hepatology   Standing Status: Future   Referral Priority: Routine Referral Type: Consultation   Referral Reason: Specialty Services Required   Requested Specialty: Hepatology   Number of Visits Requested: 1     Ambulatory referral/consult to Infectious Disease   Standing Status: Future   Referral Priority: Routine Referral Type: Consultation   Referral Reason: Specialty Services Required   Requested Specialty: Infectious Diseases   Number of Visits Requested: 1       Significant Diagnostic Studies: N/A    Pending Diagnostic Studies:       Procedure Component Value Units Date/Time    CBC Auto Differential [[id number]] Collected: 08/03/23 0256    Order Status: Sent Lab Status: In process Updated: 08/03/23 0256    Specimen: Blood            Medications:  Reconciled Home Medications:      Medication List        START taking these medications      dextrose 5 % in water (D5W) PgBk 100 mL with piperacillin-tazobactam 4.5 gram SolR 4.5 g  Inject 4.5 g into the vein every 8 (eight) hours. for 24 days     lactulose 20 gram/30 mL Soln  Commonly known as: CHRONULAC  Take 8 mLs (5 g total) by mouth once daily.            CONTINUE taking these medications      acetaminophen 325 MG tablet  Commonly known as: TYLENOL  Take 650 mg by mouth 2 (two) times daily as needed for Pain.     busPIRone 15 MG tablet  Commonly known as: BUSPAR  Take 7.5 mg by mouth 2 (two) times daily.     levothyroxine 75 MCG tablet  Commonly known as: SYNTHROID  Take 75 mcg by mouth before breakfast.     nadoloL 20 MG tablet  Commonly known as: CORGARD  Take 20 mg by mouth once daily.     pantoprazole 40 MG tablet  Commonly known as: PROTONIX  Take 40 mg by mouth once daily.     sertraline 50 MG tablet  Commonly known as: ZOLOFT  Take 50 mg by mouth once daily.             STOP taking these medications      hydroCHLOROthiazide 25 MG tablet  Commonly known as: HYDRODIURIL              Indwelling Lines/Drains at time of discharge:   Lines/Drains/Airways       Drain  Duration                  Closed/Suction Drain 08/09/23 0802 RUQ Bulb 16 Fr. 8 days                    Time spent on the discharge of patient: 45 minutes         Cristian Wayne MD  Department of Hospital Medicine  [facility] - Surgery

## 2023-08-17 NOTE — ASSESSMENT & PLAN NOTE
Mr. Page is a 53 YOM with cirrhosis 2/2 to EtOH use and HCV, esophageal varices, GIB, HTN, hypothyroidism, MDD, hydrocephalous, cholelithiasis, who presented to OSH for abdominal pain and is coming to [facility] for IR evaluation of intra-thoracic hematoma and hepatology evaluation. Presented for abdominal pain which has now resolved. CT A/P with IV contrast done and shows upper abdominal fluid collection likely a hematoma, surrounds the liver, stomach, and spleen. Origin is not identified however may represent a variceal or hepatic hematoma. No active extravasation identified. Patient transferred for IR and hepatology evaluation. History of past varices ligation. Has 1.1 cm hypoattenuating lesion in left hepatic lobe.     CT triple phase: loculated ascites; no mass lesions of the liver identified, though arterial phase not assessed.  Ascites cultures: negative gram stain and aerobic culture.  WBC 8030 > 18396.    Repeat CT: slight improvement in perihepatic fluid; mild increased free abdominopelvic fluid  8/8: d/c'd metronidazole and started zosyn  8/9: drain exchange per IR, repeat cx's    PLAN:  IR consulted, appreciate recs  - monitor drain output  - Flush drain: Yesterday had output of 22 cc. Will restart on day 1/3 of minimal output. After 3 days of minimal output, drains can be taken out.   - follow up cultures: NGTD  - Will plan to repeat CT in about 2 weeks to reassess collection    ID consulted, appreciate recs   - IV piperacillin/tazobactam 13.5 grams IV q8 hours or 12 grams IV via continuous infusion daily, plan for four weeks of therapy for now, tentative EOT 09/07/2023        - If discharged to [facility], recommend ID consult on admission. If discharged to outside LTAC, will need ID consult if available

## 2023-08-17 NOTE — DISCHARGE SUMMARY
[facility] Medicine  Discharge Summary      Patient Name: Gregg Page  MRN: [id number]  [facility]: [id number]  Patient Class: IP- Inpatient  Admission Date: 8/3/2023  Hospital Length of Stay: 14 days  Discharge Date and Time:  08/17/2023 12:44 PM  Attending Physician: Katya att. providers found   Discharging Provider: Jojo Pearson MD  Primary Care Provider: Katya, Primary Doctor  [facility] Medicine Team: [facility]OS[facility] MED 3 Jooj Pearson MD  Primary Care Team: [facility]OS[facility] MED 3    HPI:   Mr. Page is a 53 YOM with cirrhosis 2/2 to EtOH use and HCV, esophageal varices, GIB, HTN, hypothyroidism, MDD, hydrocephalous, cholelithiasis, who presented to OSH for abdominal pain and is coming to [facility] for IR evaluation of intra-thoracic hematoma and hepatology evaluation. Of note, patient was hospitalized 6/23 with similar presentation had MRCP done at that time (unavailable for review).  Abdominal pain radiates to the chest, onset 6 days prior to presentation at OSH. Patient denies melena, BRBPR, continued abdominal pain or CP. Patient states morning of transfer he started having tongue swelling and pain, feels dry, remitted with water, has dysphagia when his tongue is dry only, pain described as burning and located throughout tongue. No recent procedures/liver biopsy done. Patient also states for the past 1 week has had intractable hiccups which cause anxiety.     Patient hemodynamically stable. Labs concerning for new leukocytosis 17.9, normocytic anemia 12.6 ammonia 50, low Hct 32.2, elevated PT 16.6, INR 1.31, Troponin, lipase, U/A, BNP, CXR wnl    CT A/P with IV contrast done and shows upper abdominal fluid collection likely a hematoma, surrounds the liver, stomach, and spleen. Origin is not identified however may represent a variceal or hepatic hematoma. No active extravasation identified. Collection measures greater than 21.7 cm in transverse diameter and largest single pocket measures approx 7.5cm in maximal AP  dimension.      * No surgery found *      Hospital Course:   Procal 0.59; blood cultures show NGTD. CT triple phase shows loculated ascites and no visualized liver lesion. IR drainage of presumed intra-abdominal abscess 8/4/2023; pending cxs. Cytology reveals elevated WBC at 8030; aerobic cx negative, gram stain negative. ID consulted and recommended repeating cell count with continued abx. Repeat abdominal fluid cytology shows WBC increased to 11,000. CT obtained, pending results and IR recs. Repeat CT on 8/8 shows slight improvement in perihepatic fluid and mild increased free abdominopelvic fluid; d/c'd metronidazole and started zosyn. IR drain exchange took place 8/9, repeated cx's obtained. MRI 8/9 shows peripherally enhancing loculated fluid collection surrounding the left hepatic lobe with drainage catheter in place with no obvious connection to the biliary system. Differential includes abscess versus loculated ascites. WBC improved with pip/tazo. Awaiting LTAC placement. Today, first day of minimal output from drains. Drain can be removed on output <20mL for 3 days in a row (on 8/15, put out 22mL). Pt is going to [facility] pending insurance approval. Pt admitted at [facility]. Insurance authorization obtained on 8/17. Patient discharged in stable condition.  Will complete zosyn on 9/7.  CT AP scheduled in 2 weeks to determine if antibiotics need to be extended further.  Follow up with infectious disease arranged.       Goals of Care Treatment Preferences:  Code Status: Full Code      Consults:   Consults (From admission, onward)        Status Ordering Provider     Inpatient consult to Midline team  Once        Provider:  (Not yet assigned)    Completed DAVON TRINIDAD     Inpatient consult to Interventional Radiology  Once        Provider:  (Not yet assigned)    Completed DAVON TRINIDAD     Inpatient consult to Interventional Radiology  Once        Provider:  (Not yet assigned)    Completed  DELIO AYERS     Inpatient consult to Infectious Diseases  Once        Provider:  (Not yet assigned)    Completed DELIO AYERS     Inpatient consult to Hepatology  Once        Provider:  (Not yet assigned)    Completed LARRY NICE     Inpatient consult to Interventional Radiology  Once        Provider:  (Not yet assigned)    Completed LARRY NICE          No new Assessment & Plan notes have been filed under this hospital service since the last note was generated.  Service: Hospital Medicine    Final Active Diagnoses:    Diagnosis Date Noted POA    PRINCIPAL PROBLEM:  Intra-abdominal abscess [K65.1] 08/03/2023 Yes    Normocytic anemia [D64.9] 08/03/2023 Yes    Hypothyroidism [E03.9] 08/03/2023 Yes    Cirrhosis [K74.60] 08/03/2023 Yes    MDD (major depressive disorder) [F32.9] 08/03/2023 Yes    HCV (hepatitis C virus) [B19.20] 08/03/2023 Yes    Gallstones [K80.20] 08/03/2023 Yes    HTN (hypertension) [I10] 08/03/2023 Yes    Leukocytosis [D72.829] 08/03/2023 Yes    LUISA (iron deficiency anemia) [D50.9] 08/03/2023 Yes      Problems Resolved During this Admission:    Diagnosis Date Noted Date Resolved POA    Altered mental state [R41.82] 08/04/2023 08/09/2023 Yes    Hiccups [R06.6] 08/03/2023 08/16/2023 Yes    Tongue lesion [K14.8] 08/03/2023 08/12/2023 Yes    Hypokalemia [E87.6] 08/03/2023 08/16/2023 Yes    Hypocalcemia [E83.51] 08/03/2023 08/16/2023 Yes       Discharged Condition: stable    Disposition: Long Term Acute Care    Follow Up:    Patient Instructions:      Ambulatory referral/consult to Dermatology   Standing Status: Future   Referral Priority: Routine Referral Type: Consultation   Referral Reason: Specialty Services Required   Requested Specialty: Dermatology   Number of Visits Requested: 1     Ambulatory referral/consult to Hematology / Oncology   Standing Status: Future   Referral Priority: Routine Referral Type: Consultation   Referral Reason: Specialty Services Required   Requested  Specialty: Hematology and Oncology   Number of Visits Requested: 1     Ambulatory referral/consult to Hepatology   Standing Status: Future   Referral Priority: Routine Referral Type: Consultation   Referral Reason: Specialty Services Required   Requested Specialty: Hepatology   Number of Visits Requested: 1     Ambulatory referral/consult to Infectious Disease   Standing Status: Future   Referral Priority: Routine Referral Type: Consultation   Referral Reason: Specialty Services Required   Requested Specialty: Infectious Diseases   Number of Visits Requested: 1       Significant Diagnostic Studies: see imaging tab    Pending Diagnostic Studies:     Procedure Component Value Units Date/Time    CBC Auto Differential [[id number]] Collected: 08/03/23 0256    Order Status: Sent Lab Status: In process Updated: 08/03/23 0256    Specimen: Blood          Medications:  Reconciled Home Medications:      Medication List      START taking these medications    dextrose 5 % in water (D5W) PgBk 100 mL with piperacillin-tazobactam 4.5 gram SolR 4.5 g  Inject 4.5 g into the vein every 8 (eight) hours. for 24 days     lactulose 20 gram/30 mL Soln  Commonly known as: CHRONULAC  Take 8 mLs (5 g total) by mouth once daily.        CONTINUE taking these medications    acetaminophen 325 MG tablet  Commonly known as: TYLENOL  Take 650 mg by mouth 2 (two) times daily as needed for Pain.     busPIRone 15 MG tablet  Commonly known as: BUSPAR  Take 7.5 mg by mouth 2 (two) times daily.     levothyroxine 75 MCG tablet  Commonly known as: SYNTHROID  Take 75 mcg by mouth before breakfast.     nadoloL 20 MG tablet  Commonly known as: CORGARD  Take 20 mg by mouth once daily.     pantoprazole 40 MG tablet  Commonly known as: PROTONIX  Take 40 mg by mouth once daily.     sertraline 50 MG tablet  Commonly known as: ZOLOFT  Take 50 mg by mouth once daily.        STOP taking these medications    hydroCHLOROthiazide 25 MG tablet  Commonly known as:  HYDRODIURIL            Indwelling Lines/Drains at time of discharge:   Lines/Drains/Airways     Drain  Duration                Closed/Suction Drain 08/09/23 0802 RUQ Bulb 16 Fr. 8 days                Time spent on the discharge of patient: 35 minutes         Jojo Pearson MD  Department of [place] [facility] Medicine  [facility] - Surgery

## 2023-08-17 NOTE — PLAN OF CARE
08/17/23 0858   Post-Acute Status   Post-Acute Authorization Placement   Post-Acute Placement Status Pending payor review/awaiting authorization (if required)   Discharge Delays (!) Payor Issues   Discharge Plan   Discharge Plan A Long-term acute care facility (LTAC)   Discharge Plan B Home;Other     JANEL called Jacob w/ [facility] #[phone] to follow up on authorization, received no answer left voice message. SW to follow.    JANEL previously escalated to CM supervisors who has no [employer] escalation contact.     9:39 AM  Jacob returned call, informed authorization approved, requested adjustments to orders. JANEL informed CM and Medical team.      Faye Weaver, JAIMEE  Case Management   [facility]   Ext. [phone]

## 2023-08-17 NOTE — SUBJECTIVE & OBJECTIVE
Interval History: NAOE, VSS, drain with minimal output. Pt resting comfortably. Belly benign. Pending [facility] authorization.     Review of Systems   Constitutional: Negative.    HENT: Negative.     Respiratory: Negative.     Cardiovascular: Negative.    Gastrointestinal: Negative.    Genitourinary: Negative.    Musculoskeletal: Negative.    Skin:  Positive for wound.   Neurological: Negative.    Psychiatric/Behavioral: Negative.       Objective:     Vital Signs (Most Recent):  Temp: 97.7 °F (36.5 °C) (08/17/23 0742)  Pulse: 65 (08/17/23 0742)  Resp: 20 (08/17/23 0742)  BP: 106/63 (08/17/23 0742)  SpO2: (!) 94 % (08/16/23 2335) Vital Signs (24h Range):  Temp:  [96.3 °F (35.7 °C)-97.9 °F (36.6 °C)] 97.7 °F (36.5 °C)  Pulse:  [64-72] 65  Resp:  [16-20] 20  SpO2:  [93 %-95 %] 94 %  BP: (101-133)/(59-71) 106/63     Weight: 92.3 kg (203 lb 7.8 oz)  Body mass index is 28.38 kg/m².  No intake or output data in the 24 hours ending 08/17/23 0841      Physical Exam  HENT:      Head: Normocephalic and atraumatic.      Nose: Nose normal.      Mouth/Throat:      Mouth: Mucous membranes are moist.   Cardiovascular:      Rate and Rhythm: Normal rate and regular rhythm.      Pulses: Normal pulses.      Heart sounds: Normal heart sounds.   Pulmonary:      Effort: Pulmonary effort is normal.      Breath sounds: Normal breath sounds.   Abdominal:      General: Bowel sounds are normal.      Palpations: Abdomen is soft.      Comments: Drainage on the right RUQ   Musculoskeletal:         General: Normal range of motion.      Cervical back: Normal range of motion.   Skin:     General: Skin is warm.      Findings: Lesion present.      Comments: RUQ   Neurological:      General: No focal deficit present.      Mental Status: He is alert.   Psychiatric:         Mood and Affect: Mood normal.         Behavior: Behavior normal.         Thought Content: Thought content normal.         Judgment: Judgment normal.             Significant Labs: All  pertinent labs within the past 24 hours have been reviewed.    Significant Imaging: I have reviewed all pertinent imaging results/findings within the past 24 hours.

## 2023-08-17 NOTE — PLAN OF CARE
[facility]  Discharge Final Note    Primary Care Provider: No, Primary Doctor    Expected Discharge Date: 8/17/2023    Final Discharge Note (most recent)       Final Note - 08/17/23 1228          Final Note    Assessment Type Final Discharge Note     Anticipated Discharge Disposition Long Term Acute Care   [facility]    Hospital Resources/Appts/Education Provided Provided patient/caregiver with written discharge plan information;Provided education on problems/symptoms using teachback                   Future Appointments   Date Time Provider Department Center   8/30/2023  3:00 PM [facility] 500 LB LIMIT [facility]   8/31/2023 11:00 AM Ata Cantor MD [facility] ID [facility]

## 2023-08-17 NOTE — PROGRESS NOTES
Patient requesting sleep medication. None ordered. Intern on call notified. New orders given. Will administer and continue to monitor.

## 2023-08-17 NOTE — ASSESSMENT & PLAN NOTE
Cirrhotic liver morphology on imaging, unclear etiology though he does have a history of HCV  MELD 3.0: 10 at 8/17/2023  5:18 AM  MELD-Na: 8 at 8/17/2023  5:18 AM  Calculated from:  Serum Creatinine: 0.6 mg/dL (Using min of 1 mg/dL) at 8/17/2023  5:18 AM  Serum Sodium: 140 mmol/L (Using max of 137 mmol/L) at 8/17/2023  5:18 AM  Total Bilirubin: 0.9 mg/dL (Using min of 1 mg/dL) at 8/17/2023  5:18 AM  Serum Albumin: 2.3 g/dL at 8/17/2023  5:18 AM  INR(ratio): 1.2 at 8/17/2023  5:18 AM  Age at listing (hypothetical): 53 years  Sex: Male at 8/17/2023  5:18 AM    Ammonia 67, concern for hepatic encephalopathy 2/2 report of one episode of hallucinations per wife. No subsequent episodes. Patient improved with lactulose, and refusing further doses.    PLAN:  - Referred to outpatient hepatologist  - patient on nadolol at OSH, started on propranolol this admission  - continue pantoprazole 40 mg from OSH   - Delirium resolved

## 2023-09-04 LAB — FUNGUS SPEC CULT: NORMAL
